# Patient Record
Sex: FEMALE | Race: WHITE | NOT HISPANIC OR LATINO | Employment: PART TIME | URBAN - METROPOLITAN AREA
[De-identification: names, ages, dates, MRNs, and addresses within clinical notes are randomized per-mention and may not be internally consistent; named-entity substitution may affect disease eponyms.]

---

## 2017-03-24 ENCOUNTER — OFFICE VISIT (OUTPATIENT)
Dept: ORTHOPEDIC SURGERY | Facility: CLINIC | Age: 72
End: 2017-03-24

## 2017-03-24 VITALS
DIASTOLIC BLOOD PRESSURE: 80 MMHG | WEIGHT: 148 LBS | SYSTOLIC BLOOD PRESSURE: 148 MMHG | BODY MASS INDEX: 25.27 KG/M2 | HEART RATE: 90 BPM | HEIGHT: 64 IN

## 2017-03-24 DIAGNOSIS — R52 PAIN: Primary | ICD-10-CM

## 2017-03-24 DIAGNOSIS — S93.602A FOOT SPRAIN, LEFT, INITIAL ENCOUNTER: ICD-10-CM

## 2017-03-24 PROBLEM — S93.609A FOOT SPRAIN: Status: ACTIVE | Noted: 2017-03-24

## 2017-03-24 PROCEDURE — 99203 OFFICE O/P NEW LOW 30 MIN: CPT | Performed by: NURSE PRACTITIONER

## 2017-03-24 PROCEDURE — 73630 X-RAY EXAM OF FOOT: CPT | Performed by: NURSE PRACTITIONER

## 2017-03-24 RX ORDER — MELOXICAM 7.5 MG/1
7.5 TABLET ORAL DAILY
Qty: 30 TABLET | Refills: 0 | Status: SHIPPED | OUTPATIENT
Start: 2017-03-24 | End: 2017-06-23

## 2017-03-24 NOTE — PROGRESS NOTES
Subjective:     Patient ID: Codie Munson is a 71 y.o. female.    Chief Complaint: Left foot pain    History of Present Illness    Patient is 71 y.o female who presents with a reported 5 month history of pain at lateral aspect left foot/ankle. Denies known injury to left foot and ankle. Denies wearing new shoes however did replace insoles within last five months. Increased pain with exercises activities and relief with rest. Denies that she has been taking medications by mouth to help decrease symptoms. Has tried applying multiple topical pain relievers (OTC) without significant relief. Pain was not present prior to the last five months and she was completing the same activities. Denies known imaging. Denies presence of numbness or tingling at left foot/ankle. Denies all other concerns present at this time.      Social History     Occupational History   • Not on file.     Social History Main Topics   • Smoking status: Never Smoker   • Smokeless tobacco: Not on file   • Alcohol use No   • Drug use: Defer   • Sexual activity: Defer      Past Medical History:   Diagnosis Date   • Cardiomyopathy    • Chronic cystitis    • Cough    • Depression    • GERD (gastroesophageal reflux disease)    • Hyperlipidemia    • Hypertension    • Scarlet fever    • Strain of thoracic region    • Urethral prolapse    • URI (upper respiratory infection)    • Urinary retention      Past Surgical History:   Procedure Laterality Date   • APPENDECTOMY     • BLADDER SURGERY     • CARDIAC CATHETERIZATION      Normal. Performed at [facility].   •  SECTION         Family History   Problem Relation Age of Onset   • Heart failure Mother    • Cancer Mother      Bladder   • Kidney failure Mother    • Aneurysm Father    • Heart disease Father      CABG   • Stroke Father    • Crohn's disease Sister    • Prostate cancer Brother    • Cancer Brother      bladder         Review of Systems   Constitutional: Negative for chills, diaphoresis,  "fever and unexpected weight change.   HENT: Negative for hearing loss, nosebleeds, sore throat and tinnitus.    Eyes: Negative for pain and visual disturbance.   Respiratory: Negative for cough, shortness of breath and wheezing.    Cardiovascular: Negative for chest pain and palpitations.   Gastrointestinal: Negative for abdominal pain, diarrhea, nausea and vomiting.   Endocrine: Negative for cold intolerance, heat intolerance and polydipsia.   Genitourinary: Negative for difficulty urinating, dysuria and hematuria.   Musculoskeletal: Negative for arthralgias, joint swelling and myalgias.   Skin: Negative for rash and wound.   Allergic/Immunologic: Negative for environmental allergies.   Neurological: Negative for dizziness, syncope and numbness.   Hematological: Does not bruise/bleed easily.   Psychiatric/Behavioral: Negative for dysphoric mood and sleep disturbance. The patient is not nervous/anxious.        Objective:  Physical Exam    Vital signs reviewed.   General: No acute distress.  Eyes: conjunctiva clear; pupils equally round and reactive  ENT: external ears and nose atraumatic; oropharynx clear  CV: no peripheral edema  Resp: normal respiratory effort  Skin: no rashes or wounds; normal turgor  Psych: mood and affect appropriate; recent and remote memory intact    Vitals:    03/24/17 1424   BP: 148/80   Pulse: 90   Weight: 148 lb (67.1 kg)   Height: 64\" (162.6 cm)     Last 2 weights    03/24/17  1424   Weight: 148 lb (67.1 kg)     Body mass index is 25.4 kg/(m^2).     Left Ankle Exam   Swelling: none    Tenderness   The patient is experiencing tenderness in the CF.     Range of Motion   Dorsiflexion: 30   Plantar flexion: 45   Inversion: 45   Eversion: 25     Muscle Strength   Plantar flexion:  5/5   Anterior tibial:  5/5   Posterior tibial:  5/5  Gastrocsoleus:  5/5  Peroneal muscle:  5/5    Tests   Anterior drawer: negative  Varus tilt: negative    Other   Erythema: absent  Scars: absent  Sensation: " normal  Pulse: present    Comments:  Pain present lateral aspect foot  Negative Homans sign  Negative Squeeze test          Imaging:  Left Foot X-Ray  Indication: Pain  AP, Lateral, and Oblique views    Findings:  No fracture  No bony lesion  Normal soft tissues  Calcaneal osteoarthritic changes noted    No prior studies were available for comparison.    Assessment:       1. Pain    2. Foot sprain, left, initial encounter          Plan:  1. Discussed plan of care with patient. Will start meloxicam daily.  2. Provided with sample of Pennsaid to apply to left foot. Will plan to follow-up in four weeks. Encouraged patient to call clinic. Will attempt to send in prescription for Voltaren. If not covered, will request sample from our [employer] representative. Encouraged to double check insoles to assure proper fit. Patient verbalized understanding of all information and agrees with plan of care. Denies all other concerns present at this time.     RAJWINDER query complete.

## 2017-06-23 ENCOUNTER — OFFICE VISIT (OUTPATIENT)
Dept: CARDIOLOGY | Facility: CLINIC | Age: 72
End: 2017-06-23

## 2017-06-23 VITALS
SYSTOLIC BLOOD PRESSURE: 140 MMHG | BODY MASS INDEX: 25.4 KG/M2 | HEART RATE: 67 BPM | HEIGHT: 64 IN | WEIGHT: 148.8 LBS | DIASTOLIC BLOOD PRESSURE: 80 MMHG

## 2017-06-23 DIAGNOSIS — R06.09 DYSPNEA ON EXERTION: ICD-10-CM

## 2017-06-23 DIAGNOSIS — IMO0001 ELEVATED BP: Primary | ICD-10-CM

## 2017-06-23 DIAGNOSIS — E78.5 HYPERLIPIDEMIA, UNSPECIFIED HYPERLIPIDEMIA TYPE: ICD-10-CM

## 2017-06-23 PROCEDURE — 99214 OFFICE O/P EST MOD 30 MIN: CPT | Performed by: INTERNAL MEDICINE

## 2017-06-23 PROCEDURE — 93000 ELECTROCARDIOGRAM COMPLETE: CPT | Performed by: INTERNAL MEDICINE

## 2017-06-23 RX ORDER — ROSUVASTATIN CALCIUM 20 MG/1
20 TABLET, COATED ORAL NIGHTLY
Qty: 90 TABLET | Refills: 3 | Status: SHIPPED | OUTPATIENT
Start: 2017-06-23 | End: 2018-08-25 | Stop reason: SDUPTHER

## 2017-06-23 NOTE — PROGRESS NOTES
Date of Office Visit: 2017  Encounter Provider: Anamika Lane MD  Place of Service: [facility] CARDIOLOGY  Patient Name: Codie Munson  :1945      Patient ID:  Codie Munson is a 71 y.o. female is here for  followup for         History of Present Illness    I originally saw her for complaints of dizziness, palpitations, and shortness of air in  . at that time she had a stress study which was abnormal with subsequent cardiac  catheterization at [facility] which showed no significant coronary artery disease.   Her left ventricular systolic function was normal. She however had an echocardiogram done  at the same time which showed an ejection fraction of 40%. Because of her lightheadedness  and dizziness, she had carotid duplex studies which showed minimal disease of the left  carotid artery. She then had repeat carotid duplex study performed on 2010  which showed no carotid artery disease.      Her last echocardiogram done in 2009 showed an ejection fraction of 54%, redundant  intra-atrial septum, a normal saline contrast study, grade I diastolic dysfunction, and no  significant valve abnormalities.      She had a bladder resuspension with Dr. Harish Arellano.      She was lightheaded and we decreased her losartan to 25 mg daily and she improved but then she worsened and so it was discontinued. She said the lightheadedness went away completely when she started wearing compression stockings which are knee high. She went to see Dr. Elliott, and he  did not think there was anything he could do with her left leg but she has continued  aching in that leg and she is wearing compression stockings daily. I did get the letter from Dr. Elliott's office which I reviewed. It says that the patient  has evidence of reflux of the lesser and greater saphenous veins. They recommended  compression stockings because she was not very symptomatic. This was  dictated on  2014.      At the patients last visit 2016 she was having a lot of coughing, fatigue, respiratory issues. She had been seen and placed on steroids and Levaquin but had severe reaction with left shoulder pain due to Levaquin and then went on to see Dr. Landeros from [facility], and he agreed that she had severe allergies and started her on allergy shots. I was however concerned though because of her history of cardiomyopathy and ordered an echocardiogram which was done 2016, and this showed ejection fraction of 60% with grade I diastolic dysfunction, normal segmental wall motion and no significant valve disease.            Past Medical History:   Diagnosis Date   • Cardiomyopathy    • Chronic cystitis    • Cough    • Depression    • GERD (gastroesophageal reflux disease)    • Hyperlipidemia    • Hypertension    • Scarlet fever    • Strain of thoracic region    • Urethral prolapse    • URI (upper respiratory infection)    • Urinary retention          Past Surgical History:   Procedure Laterality Date   • APPENDECTOMY     • BLADDER SURGERY     • CARDIAC CATHETERIZATION      Normal. Performed at [facility].   •  SECTION         Current Outpatient Prescriptions on File Prior to Visit   Medication Sig Dispense Refill   • esomeprazole (NexIUM) 40 MG capsule Take 40 mg by mouth every morning before breakfast.     • Psyllium (METAMUCIL FIBER PO) Take  by mouth.     • [DISCONTINUED] CRESTOR 20 MG tablet TAKE ONE TABLET BY MOUTH DAILY 90 tablet 1   • [DISCONTINUED] cefdinir (OMNICEF) 300 MG capsule Take 1 capsule by mouth 2 (Two) Times a Day. 20 capsule 0   • [DISCONTINUED] HYDROcodone-homatropine (HYCODAN) 5-1.5 MG/5ML syrup Take 5 mL by mouth Every 6 (Six) Hours As Needed for cough. 180 mL 0   • [DISCONTINUED] meloxicam (MOBIC) 7.5 MG tablet Take 1 tablet by mouth Daily. 30 tablet 0     No current facility-administered medications on file prior to visit.        Social History  "    Social History   • Marital status:      Spouse name: N/A   • Number of children: N/A   • Years of education: N/A     Occupational History   • Not on file.     Social History Main Topics   • Smoking status: Never Smoker   • Smokeless tobacco: Not on file   • Alcohol use No   • Drug use: Defer   • Sexual activity: Defer     Other Topics Concern   • Not on file     Social History Narrative           Review of Systems   Constitution: Negative.   HENT: Negative for congestion and headaches.    Eyes: Negative for vision loss in left eye and vision loss in right eye.   Respiratory: Negative.  Negative for cough, hemoptysis, shortness of breath, sleep disturbances due to breathing, snoring, sputum production and wheezing.    Endocrine: Negative.    Hematologic/Lymphatic: Negative.    Skin: Negative for poor wound healing and rash.   Musculoskeletal: Negative for falls, gout, muscle cramps and myalgias.   Gastrointestinal: Negative for abdominal pain, diarrhea, dysphagia, hematemesis, melena, nausea and vomiting.   Neurological: Negative for excessive daytime sleepiness, dizziness, light-headedness, loss of balance, seizures and vertigo.   Psychiatric/Behavioral: Negative for depression and substance abuse. The patient is not nervous/anxious.        Procedures    ECG 12 Lead  Date/Time: 6/23/2017 10:12 AM  Performed by: KADIE HIDALGO  Authorized by: KADIE HIDALGO   Comparison: compared with previous ECG   Similar to previous ECG  Rhythm: sinus rhythm  T depression: V3 and V4  T flattening: V5 and V6  Clinical impression: abnormal ECG               Objective:      Vitals:    06/23/17 1002   BP: 140/80   BP Location: Right arm   Patient Position: Sitting   Pulse: 67   Weight: 148 lb 12.8 oz (67.5 kg)   Height: 64\" (162.6 cm)     Body mass index is 25.54 kg/(m^2).    Physical Exam   Constitutional: She is oriented to person, place, and time. She appears well-developed and well-nourished. No distress. "   HENT:   Head: Normocephalic and atraumatic.   Eyes: Conjunctivae are normal. No scleral icterus.   Neck: Neck supple. No JVD present. Carotid bruit is not present. No thyromegaly present.   Cardiovascular: Normal rate, regular rhythm, S1 normal, S2 normal, normal heart sounds and intact distal pulses.   No extrasystoles are present. PMI is not displaced.  Exam reveals no gallop.    No murmur heard.  Pulses:       Carotid pulses are 2+ on the right side, and 2+ on the left side.       Radial pulses are 2+ on the right side, and 2+ on the left side.        Dorsalis pedis pulses are 2+ on the right side, and 2+ on the left side.        Posterior tibial pulses are 2+ on the right side, and 2+ on the left side.   Pulmonary/Chest: Effort normal and breath sounds normal. No respiratory distress. She has no wheezes. She has no rhonchi. She has no rales. She exhibits no tenderness.   Abdominal: Soft. Bowel sounds are normal. She exhibits no distension, no abdominal bruit and no mass. There is no tenderness.   Musculoskeletal: She exhibits no edema or deformity.   Lymphadenopathy:     She has no cervical adenopathy.   Neurological: She is alert and oriented to person, place, and time. No cranial nerve deficit.   Skin: Skin is warm and dry. No rash noted. She is not diaphoretic. No cyanosis. No pallor. Nails show no clubbing.   Psychiatric: She has a normal mood and affect. Judgment normal.   Vitals reviewed.      Lab Review:       Assessment:      Diagnosis Plan   1. Elevated BP  Treadmill Stress Test    Vascular Screening   2. Hyperlipidemia, unspecified hyperlipidemia type  Treadmill Stress Test    Hepatic Function Panel    Lipid Panel    Vascular Screening   3. Dyspnea on exertion  Treadmill Stress Test     1. Hyperlipidemia, on Crestor and fish oil. Well controlled. Check liver and lipids.    2. Hypertension. No meds, just watch for now.   3. Normal renal arteriogram in 2002.   4. Normal cardiac catheterization in 2005.    5. Venous varicosities. See Dr. Perrin.  6. Numbness of her third toe on her right foot. Stable.   7. Recent bladder resuspension with Dr. Harish Arellano.  8. Severe dyspnea, worse with exertion and fatigue. Set up treadmill stress study.  9. Allergies, getting allergy shots, sees Dr. Landeros.    Set up vascular screening.      Plan:       See back in 1 year.

## 2017-06-26 ENCOUNTER — TRANSCRIBE ORDERS (OUTPATIENT)
Dept: CARDIOLOGY | Facility: HOSPITAL | Age: 72
End: 2017-06-26

## 2017-06-26 ENCOUNTER — TRANSCRIBE ORDERS (OUTPATIENT)
Dept: CARDIOLOGY | Facility: CLINIC | Age: 72
End: 2017-06-26

## 2017-06-26 DIAGNOSIS — IMO0001 ELEVATED BP: Primary | ICD-10-CM

## 2017-06-28 ENCOUNTER — HOSPITAL ENCOUNTER (OUTPATIENT)
Dept: CARDIOLOGY | Facility: HOSPITAL | Age: 72
Discharge: HOME | End: 2017-06-28
Attending: INTERNAL MEDICINE | Admitting: INTERNAL MEDICINE

## 2017-06-28 ENCOUNTER — LAB (OUTPATIENT)
Dept: LAB | Facility: HOSPITAL | Age: 72
End: 2017-06-28
Attending: INTERNAL MEDICINE

## 2017-06-28 ENCOUNTER — TELEPHONE (OUTPATIENT)
Dept: CARDIOLOGY | Facility: CLINIC | Age: 72
End: 2017-06-28

## 2017-06-28 VITALS
HEIGHT: 64 IN | OXYGEN SATURATION: 100 % | SYSTOLIC BLOOD PRESSURE: 151 MMHG | BODY MASS INDEX: 25.27 KG/M2 | DIASTOLIC BLOOD PRESSURE: 95 MMHG | HEART RATE: 81 BPM | WEIGHT: 148 LBS | RESPIRATION RATE: 18 BRPM

## 2017-06-28 DIAGNOSIS — E78.5 HYPERLIPIDEMIA, UNSPECIFIED HYPERLIPIDEMIA TYPE: ICD-10-CM

## 2017-06-28 DIAGNOSIS — R06.09 DYSPNEA ON EXERTION: Primary | ICD-10-CM

## 2017-06-28 DIAGNOSIS — R06.09 DYSPNEA ON EXERTION: ICD-10-CM

## 2017-06-28 DIAGNOSIS — IMO0001 ELEVATED BP: ICD-10-CM

## 2017-06-28 LAB
ALBUMIN SERPL-MCNC: 4 G/DL (ref 3.5–5.2)
ALP SERPL-CCNC: 90 U/L (ref 40–129)
ALT SERPL W P-5'-P-CCNC: 12 U/L (ref 5–33)
AST SERPL-CCNC: 14 U/L (ref 5–32)
BH CV STRESS BP STAGE 1: NORMAL
BH CV STRESS DURATION MIN STAGE 1: 3
BH CV STRESS DURATION SEC STAGE 1: 0
BH CV STRESS GRADE STAGE 1: 10
BH CV STRESS HR STAGE 1: 154
BH CV STRESS METS STAGE 1: 5
BH CV STRESS PROTOCOL 1: NORMAL
BH CV STRESS RECOVERY BP: NORMAL MMHG
BH CV STRESS RECOVERY HR: 86 BPM
BH CV STRESS SPEED STAGE 1: 1.7
BH CV STRESS STAGE 1: 1
BILIRUB CONJ SERPL-MCNC: <0.2 MG/DL (ref 0.2–0.3)
BILIRUB INDIRECT SERPL-MCNC: ABNORMAL MG/DL
BILIRUB SERPL-MCNC: 0.4 MG/DL (ref 0.2–1.2)
CHOLEST SERPL-MCNC: 167 MG/DL (ref 0–200)
HDLC SERPL-MCNC: 50 MG/DL (ref 40–60)
LDLC SERPL CALC-MCNC: 83 MG/DL (ref 0–100)
LDLC/HDLC SERPL: 1.66 {RATIO}
MAXIMAL PREDICTED HEART RATE: 147 BPM
PERCENT MAX PREDICTED HR: 104.76 %
PROT SERPL-MCNC: 6.6 G/DL (ref 6–8.5)
STRESS BASELINE BP: NORMAL MMHG
STRESS BASELINE HR: 81 BPM
STRESS O2 SAT REST: 100 %
STRESS PERCENT HR: 123 %
STRESS POST ESTIMATED WORKLOAD: 4.6 METS
STRESS POST EXERCISE DUR MIN: 3 MIN
STRESS POST EXERCISE DUR SEC: 0 SEC
STRESS POST PEAK BP: NORMAL MMHG
STRESS POST PEAK HR: 154 BPM
STRESS TARGET HR: 125 BPM
TRIGL SERPL-MCNC: 170 MG/DL (ref 0–150)
VLDLC SERPL-MCNC: 34 MG/DL (ref 7–27)

## 2017-06-28 PROCEDURE — 93018 CV STRESS TEST I&R ONLY: CPT | Performed by: INTERNAL MEDICINE

## 2017-06-28 PROCEDURE — 80076 HEPATIC FUNCTION PANEL: CPT

## 2017-06-28 PROCEDURE — 36415 COLL VENOUS BLD VENIPUNCTURE: CPT

## 2017-06-28 PROCEDURE — 93016 CV STRESS TEST SUPVJ ONLY: CPT | Performed by: INTERNAL MEDICINE

## 2017-06-28 PROCEDURE — 93017 CV STRESS TEST TRACING ONLY: CPT

## 2017-06-28 PROCEDURE — 80061 LIPID PANEL: CPT

## 2017-06-28 NOTE — TELEPHONE ENCOUNTER
----- Message from Anamika Lane MD sent at 6/28/2017  8:43 AM EDT -----  pls call and let her know that labs look good, no changes.

## 2017-06-29 ENCOUNTER — TRANSCRIBE ORDERS (OUTPATIENT)
Dept: ADMINISTRATIVE | Facility: HOSPITAL | Age: 72
End: 2017-06-29

## 2017-06-29 DIAGNOSIS — Z13.9 SCREENING: Primary | ICD-10-CM

## 2017-07-05 ENCOUNTER — TELEPHONE (OUTPATIENT)
Dept: CARDIOLOGY | Facility: CLINIC | Age: 72
End: 2017-07-05

## 2017-07-05 NOTE — TELEPHONE ENCOUNTER
Pt called, she is having some dental wok in the next couple of days. She has a cracked tooth and was wondering if she needs an anti biotic prior to getting it fixed. She said she was concerned because of her upcoming testing and going to see someone about her legs. She can be reached at # [phone] if need be. Please advise.    Thanks,  Lacey

## 2017-07-10 ENCOUNTER — HOSPITAL ENCOUNTER (OUTPATIENT)
Dept: NUCLEAR MEDICINE | Facility: HOSPITAL | Age: 72
Discharge: HOME | End: 2017-07-10
Attending: INTERNAL MEDICINE

## 2017-07-10 ENCOUNTER — HOSPITAL ENCOUNTER (OUTPATIENT)
Dept: CARDIOLOGY | Facility: HOSPITAL | Age: 72
Discharge: HOME | End: 2017-07-10
Attending: INTERNAL MEDICINE

## 2017-07-10 DIAGNOSIS — R06.09 DYSPNEA ON EXERTION: ICD-10-CM

## 2017-07-10 LAB
BH CV NUCLEAR PRIOR STUDY: 3
BH CV STRESS BP STAGE 1: NORMAL
BH CV STRESS DURATION MIN STAGE 1: 3
BH CV STRESS DURATION SEC STAGE 1: 0
BH CV STRESS GRADE STAGE 1: 10
BH CV STRESS HR STAGE 1: 146
BH CV STRESS METS STAGE 1: 5
BH CV STRESS PROTOCOL 1: NORMAL
BH CV STRESS RECOVERY BP: NORMAL MMHG
BH CV STRESS RECOVERY HR: 82 BPM
BH CV STRESS SPEED STAGE 1: 1.7
BH CV STRESS STAGE 1: 1
LV EF NUC BP: 47 %
MAXIMAL PREDICTED HEART RATE: 147 BPM
PERCENT MAX PREDICTED HR: 100.68 %
STRESS BASELINE BP: NORMAL MMHG
STRESS BASELINE HR: 81 BPM
STRESS O2 SAT REST: 100 %
STRESS PERCENT HR: 118 %
STRESS POST ESTIMATED WORKLOAD: 4.6 METS
STRESS POST EXERCISE DUR MIN: 2 MIN
STRESS POST EXERCISE DUR SEC: 14 SEC
STRESS POST PEAK BP: NORMAL MMHG
STRESS POST PEAK HR: 148 BPM
STRESS TARGET HR: 125 BPM

## 2017-07-10 PROCEDURE — A9500 TC99M SESTAMIBI: HCPCS | Performed by: INTERNAL MEDICINE

## 2017-07-10 PROCEDURE — 93016 CV STRESS TEST SUPVJ ONLY: CPT | Performed by: INTERNAL MEDICINE

## 2017-07-10 PROCEDURE — 0 TECHNETIUM SESTAMIBI: Performed by: INTERNAL MEDICINE

## 2017-07-10 PROCEDURE — 93017 CV STRESS TEST TRACING ONLY: CPT

## 2017-07-10 PROCEDURE — 78452 HT MUSCLE IMAGE SPECT MULT: CPT | Performed by: INTERNAL MEDICINE

## 2017-07-10 PROCEDURE — 93018 CV STRESS TEST I&R ONLY: CPT | Performed by: INTERNAL MEDICINE

## 2017-07-10 PROCEDURE — 78452 HT MUSCLE IMAGE SPECT MULT: CPT

## 2017-07-10 RX ADMIN — Medication 1 DOSE: at 07:30

## 2017-07-10 RX ADMIN — Medication 1 DOSE: at 10:45

## 2017-07-11 ENCOUNTER — TELEPHONE (OUTPATIENT)
Dept: CARDIOLOGY | Facility: CLINIC | Age: 72
End: 2017-07-11

## 2017-07-11 NOTE — TELEPHONE ENCOUNTER
----- Message from Anamika Lane MD sent at 7/11/2017  7:51 AM EDT -----  pls call and let her know that the stress is normal.

## 2017-07-12 ENCOUNTER — TELEPHONE (OUTPATIENT)
Dept: CARDIOLOGY | Facility: CLINIC | Age: 72
End: 2017-07-12

## 2017-07-12 ENCOUNTER — HOSPITAL ENCOUNTER (OUTPATIENT)
Dept: CARDIOLOGY | Facility: HOSPITAL | Age: 72
Discharge: HOME | End: 2017-07-12
Attending: INTERNAL MEDICINE | Admitting: INTERNAL MEDICINE

## 2017-07-12 VITALS
BODY MASS INDEX: 23.66 KG/M2 | DIASTOLIC BLOOD PRESSURE: 74 MMHG | SYSTOLIC BLOOD PRESSURE: 141 MMHG | WEIGHT: 142 LBS | HEART RATE: 62 BPM | HEIGHT: 65 IN

## 2017-07-12 DIAGNOSIS — Z13.9 SCREENING: ICD-10-CM

## 2017-07-12 LAB
BH CV ECHO MEAS - DIST AO DIAM: 1.19 CM
BH CV VAS BP LEFT ARM: NORMAL MMHG
BH CV VAS BP RIGHT ARM: NORMAL MMHG
BH CV XLRA MEAS - MID AO DIAM: 1.54 CM
BH CV XLRA MEAS - PAD LEFT ABI DP: 1.17
BH CV XLRA MEAS - PAD LEFT ABI PT: 1.19
BH CV XLRA MEAS - PAD LEFT ARM: 141 MMHG
BH CV XLRA MEAS - PAD LEFT LEG DP: 166 MMHG
BH CV XLRA MEAS - PAD LEFT LEG PT: 168 MMHG
BH CV XLRA MEAS - PAD RIGHT ABI DP: 1.2
BH CV XLRA MEAS - PAD RIGHT ABI PT: 1.2
BH CV XLRA MEAS - PAD RIGHT ARM: 137 MMHG
BH CV XLRA MEAS - PAD RIGHT LEG DP: 170 MMHG
BH CV XLRA MEAS - PAD RIGHT LEG PT: 170 MMHG
BH CV XLRA MEAS - PROX AO DIAM: 1.74 CM
BH CV XLRA MEAS LEFT ICA/CCA RATIO: 1.25
BH CV XLRA MEAS LEFT MID CCA PSV: NORMAL CM/SEC
BH CV XLRA MEAS LEFT MID ICA PSV: NORMAL CM/SEC
BH CV XLRA MEAS LEFT PROX ECA PSV: NORMAL CM/SEC
BH CV XLRA MEAS RIGHT ICA/CCA RATIO: 1.22
BH CV XLRA MEAS RIGHT MID CCA PSV: NORMAL CM/SEC
BH CV XLRA MEAS RIGHT MID ICA PSV: NORMAL CM/SEC
BH CV XLRA MEAS RIGHT PROX ECA PSV: NORMAL CM/SEC

## 2017-07-12 PROCEDURE — 93799 UNLISTED CV SVC/PROCEDURE: CPT

## 2017-07-12 NOTE — TELEPHONE ENCOUNTER
----- Message from Anmaika Lane MD sent at 7/12/2017  3:27 PM EDT -----  pls call, vascular testing normal.

## 2018-07-20 ENCOUNTER — OFFICE VISIT (OUTPATIENT)
Dept: FAMILY MEDICINE CLINIC | Facility: CLINIC | Age: 73
End: 2018-07-20

## 2018-07-20 VITALS
BODY MASS INDEX: 23.66 KG/M2 | HEIGHT: 65 IN | TEMPERATURE: 98.2 F | HEART RATE: 74 BPM | WEIGHT: 142 LBS | OXYGEN SATURATION: 98 % | DIASTOLIC BLOOD PRESSURE: 82 MMHG | RESPIRATION RATE: 16 BRPM | SYSTOLIC BLOOD PRESSURE: 120 MMHG

## 2018-07-20 DIAGNOSIS — Z00.00 MEDICARE ANNUAL WELLNESS VISIT, SUBSEQUENT: Primary | ICD-10-CM

## 2018-07-20 DIAGNOSIS — Z12.31 SCREENING MAMMOGRAM, ENCOUNTER FOR: ICD-10-CM

## 2018-07-20 DIAGNOSIS — E78.2 MIXED HYPERLIPIDEMIA: ICD-10-CM

## 2018-07-20 PROCEDURE — 99212 OFFICE O/P EST SF 10 MIN: CPT | Performed by: PHYSICIAN ASSISTANT

## 2018-07-20 PROCEDURE — G0439 PPPS, SUBSEQ VISIT: HCPCS | Performed by: PHYSICIAN ASSISTANT

## 2018-07-20 RX ORDER — MAGNESIUM OXIDE 400 MG/1
250 TABLET ORAL EVERY OTHER DAY
COMMUNITY
End: 2020-10-28

## 2018-07-20 RX ORDER — FLUTICASONE PROPIONATE 50 MCG
2 SPRAY, SUSPENSION (ML) NASAL AS NEEDED
COMMUNITY

## 2018-07-20 NOTE — PROGRESS NOTES
QUICK REFERENCE INFORMATION:  The ABCs of the Annual Wellness Visit    Subsequent Medicare Wellness Visit    HEALTH RISK ASSESSMENT    1945    Recent Hospitalizations:  No hospitalization(s) within the last year..        Current Medical Providers:  Patient Care Team:  Sri Leach PA-C as PCP - General (Family Medicine)        Smoking Status:  History   Smoking Status   • Never Smoker   Smokeless Tobacco   • Never Used       Alcohol Consumption:  History   Alcohol Use No       Depression Screen:   PHQ-2/PHQ-9 Depression Screening 7/20/2018   Little interest or pleasure in doing things 0   Feeling down, depressed, or hopeless 0   Total Score 0       Health Habits and Functional and Cognitive Screening:  No flowsheet data found.        Does the patient have evidence of cognitive impairment? No    Aspirin use counseling: Does not need ASA (and currently is not on it)      Recent Lab Results:  CMP:  Lab Results   Component Value Date    BUN 11 08/01/2016    CREATININE 0.92 08/01/2016    EGFRIFNONA 60 (L) 08/01/2016    BCR 12.0 08/01/2016     08/01/2016    K 4.0 08/01/2016    CO2 29.1 (H) 08/01/2016    CALCIUM 9.5 08/01/2016    ALBUMIN 4.00 06/28/2017    BILITOT 0.4 06/28/2017    ALKPHOS 90 06/28/2017    AST 14 06/28/2017    ALT 12 06/28/2017     Lipid Panel:  Lab Results   Component Value Date    CHOL 167 06/28/2017    TRIG 170 (H) 06/28/2017    HDL 50 06/28/2017    VLDL 34 (H) 06/28/2017    LDLHDL 1.66 06/28/2017     HbA1c:       Visual Acuity:  No exam data present    Age-appropriate Screening Schedule:  Refer to the list below for future screening recommendations based on patient's age, sex and/or medical conditions. Orders for these recommended tests are listed in the plan section. The patient has been provided with a written plan.    Health Maintenance   Topic Date Due   • TDAP/TD VACCINES (1 - Tdap) 09/04/1962   • ZOSTER VACCINE (1 of 2) 09/04/1993   • PNEUMOCOCCAL VACCINES (65+ LOW/MEDIUM RISK) (1  "of 2 - PCV13) 09/04/2008   • INFLUENZA VACCINE  08/01/2018   • LIPID PANEL  07/20/2019   • MAMMOGRAM  Excluded   • COLONOSCOPY  Excluded        Subjective   History of Present Illness    Codie Munson is a 72 y.o. female who presents for an Subsequent Wellness Visit. Codie states she is feeling well at today's office visit.  Diet has been healthy.  She tries to exercise by walking several times a week.  Sleep has been good.  Bowel movements are daily without dark black tarry stools.  She refuses colonoscopy.  States she had her shingles immunization at [facility] in [place] this year.  Developed a slight rash at injection site.  She was seen at urgent care in [place] night last week and diagnosed with acute sinusitis.  She was prescribed Ceftin antibiotic.  She also has cerumen impaction of right ear.  They were able to do an ear lavage with resolution of earwax.  She's been having a hoarse voice with postnasal drip.  Denied any sore throat, fevers, chills, ear pain, sinus pressure, headache, chest pain, shortness of air, wheezing, nausea, vomiting, diarrhea, abdominal pain or swelling of ankles.  Currently seeing Dr. Anamika Lane, cardiologist.    The following portions of the patient's history were reviewed and updated as appropriate: allergies, current medications, past family history, past medical history, past social history and past surgical history.  Social History   Substance Use Topics   • Smoking status: Never Smoker   • Smokeless tobacco: Never Used   • Alcohol use No       Vitals:    07/20/18 0806   BP: 120/82   Pulse: 74   Resp: 16   Temp: 98.2 °F (36.8 °C)   TempSrc: Oral   SpO2: 98%   Weight: 64.4 kg (142 lb)   Height: 163.8 cm (64.5\")       Body mass index is 24 kg/m².   Allergies   Allergen Reactions   • Augmentin [Amoxicillin-Pot Clavulanate] Rash   • Biaxin [Clarithromycin] Rash       Wt Readings from Last 3 Encounters:   07/20/18 64.4 kg (142 lb)   07/12/17 64.4 kg (142 lb)   06/28/17 " 67.1 kg (148 lb)       BP Readings from Last 3 Encounters:   07/20/18 120/82   07/12/17 141/74   06/28/17 151/95     Outpatient Medications Prior to Visit   Medication Sig Dispense Refill   • esomeprazole (NexIUM) 40 MG capsule Take 40 mg by mouth every morning before breakfast.     • Multiple Vitamin (MULTI VITAMIN DAILY PO) Take 1 tablet by mouth Daily.     • rosuvastatin (CRESTOR) 20 MG tablet Take 1 tablet by mouth Every Night. 90 tablet 3   • ALLERGY SERUM INJECTION Inject  under the skin 1 (One) Time.     • Psyllium (METAMUCIL FIBER PO) Take  by mouth.     • TURMERIC PO Take 1 tablet by mouth Daily.       No facility-administered medications prior to visit.        Patient Active Problem List   Diagnosis   • Cardiomyopathy (CMS/HCC)   • Bladder infection, chronic   • Blues   • Gastro-esophageal reflux disease without esophagitis   • HLD (hyperlipidemia)   • Prolapse of urethra   • Incomplete bladder emptying   • Gastroesophageal reflux disease with hiatal hernia   • Erosive gastritis   • Diverticulosis of large intestine without hemorrhage   • Elevated BP   • Chronic UTI   • Foot sprain   • Medicare annual wellness visit, subsequent   • Screening mammogram, encounter for       Advance Care Planning:  has NO advance directive - not interested in additional information    Identification of Risk Factors:  Risk factors include: cardiovascular risk.    Review of Systems   Constitutional: Negative.    HENT: Negative.    Eyes: Negative.    Respiratory: Negative.    Cardiovascular: Negative.    Gastrointestinal: Negative.    Endocrine: Negative.    Genitourinary: Negative.    Musculoskeletal: Negative.    Skin: Negative.    Allergic/Immunologic: Negative.    Neurological: Negative.    Hematological: Negative.    Psychiatric/Behavioral: Negative.    All other systems reviewed and are negative.      Compared to one year ago, the patient feels her physical health is the same.  Compared to one year ago, the patient feels  her mental health is the same.    Objective     Physical Exam   Constitutional: She is oriented to person, place, and time. Vital signs are normal. She appears well-developed and well-nourished.   HENT:   Head: Normocephalic and atraumatic.   Right Ear: Hearing, tympanic membrane, external ear and ear canal normal.   Left Ear: Hearing, tympanic membrane, external ear and ear canal normal.   Nose: Nose normal. Right sinus exhibits no maxillary sinus tenderness and no frontal sinus tenderness. Left sinus exhibits no maxillary sinus tenderness and no frontal sinus tenderness.   Mouth/Throat: Uvula is midline, oropharynx is clear and moist and mucous membranes are normal.   Eyes: Pupils are equal, round, and reactive to light. Conjunctivae, EOM and lids are normal.   Neck: Trachea normal and phonation normal. Neck supple. Carotid bruit is not present. No edema present. No thyromegaly present.   Cardiovascular: Normal rate, regular rhythm, S1 normal, S2 normal, normal heart sounds and normal pulses.    No murmur heard.  Pulmonary/Chest: Effort normal and breath sounds normal. Right breast exhibits no inverted nipple, no mass, no nipple discharge, no skin change and no tenderness. Left breast exhibits no inverted nipple, no mass, no nipple discharge, no skin change and no tenderness.   Abdominal: Soft. Normal appearance, normal aorta and bowel sounds are normal. There is no hepatomegaly. There is no tenderness.   Lymphadenopathy:     She has no cervical adenopathy.   Neurological: She is alert and oriented to person, place, and time.   Skin: Skin is warm, dry and intact. Capillary refill takes less than 2 seconds.   Psychiatric: She has a normal mood and affect. Her speech is normal and behavior is normal. Judgment and thought content normal. Cognition and memory are normal.       Vitals:    07/20/18 0806   BP: 120/82   Pulse: 74   Resp: 16   Temp: 98.2 °F (36.8 °C)   TempSrc: Oral   SpO2: 98%   Weight: 64.4 kg (142 lb)  "  Height: 163.8 cm (64.5\")       Patient's Body mass index is 24 kg/m². BMI is within normal parameters. No follow-up required.      Assessment/Plan   Patient Self-Management and Personalized Health Advice  The patient has been provided with information about: exercise and preventive services including:   · Screening mammography, referral placed.    Visit Diagnoses:    ICD-10-CM ICD-9-CM   1. Medicare annual wellness visit, subsequent Z00.00 V70.0   2. Mixed hyperlipidemia E78.2 272.2   3. Screening mammogram, encounter for Z12.31 V76.12       Orders Placed This Encounter   Procedures   • Mammo Screening Digital Tomosynthesis Bilateral With CAD     Standing Status:   Future     Standing Expiration Date:   7/20/2019     Scheduling Instructions:      Bono     Order Specific Question:   Reason for Exam:     Answer:   screening mammogram   • Comprehensive Metabolic Panel     Standing Status:   Future     Standing Expiration Date:   7/21/2019   • Lipid Panel With LDL / HDL Ratio     Standing Status:   Future     Standing Expiration Date:   7/21/2019   • CBC & Differential     Standing Status:   Future     Standing Expiration Date:   7/21/2019     Order Specific Question:   Manual Differential     Answer:   No   1.  Annual sequential Medicare physical with screening mammogram: I have placed a referral to [facility] for mammogram.  Codie will be notified of test results when completed.  I'll try to obtain her updated immunizations from [facility] pharmacy as well.  States she has had immunizations there in the past year.  Codie refused colonoscopy at this time.  2.  Chronic and stable hyperlipidemia: I have given Codie written orders to have fasting blood work performed at [facility] that includes: CBC, CMP and a lipid profile.    Outpatient Encounter Prescriptions as of 7/20/2018   Medication Sig Dispense Refill   • esomeprazole (NexIUM) 40 MG capsule Take 40 mg by mouth every morning before breakfast.   "   • fluticasone (FLONASE) 50 MCG/ACT nasal spray 2 sprays into each nostril Daily.     • magnesium oxide (MAG-OX) 400 MG tablet Take 250 mg by mouth 2 (Two) Times a Day.     • Multiple Vitamin (MULTI VITAMIN DAILY PO) Take 1 tablet by mouth Daily.     • rosuvastatin (CRESTOR) 20 MG tablet Take 1 tablet by mouth Every Night. 90 tablet 3   • [DISCONTINUED] ALLERGY SERUM INJECTION Inject  under the skin 1 (One) Time.     • [DISCONTINUED] Psyllium (METAMUCIL FIBER PO) Take  by mouth.     • [DISCONTINUED] TURMERIC PO Take 1 tablet by mouth Daily.       No facility-administered encounter medications on file as of 7/20/2018.        Reviewed use of high risk medication in the elderly: yes  Reviewed for potential of harmful drug interactions in the elderly: yes    Follow Up:  No Follow-up on file.     An After Visit Summary and PPPS with all of these plans were given to the patient.

## 2018-07-24 ENCOUNTER — LAB (OUTPATIENT)
Dept: LAB | Facility: HOSPITAL | Age: 73
End: 2018-07-24

## 2018-07-24 DIAGNOSIS — E78.2 MIXED HYPERLIPIDEMIA: ICD-10-CM

## 2018-07-24 LAB
ALBUMIN SERPL-MCNC: 4 G/DL (ref 3.5–5.2)
ALBUMIN/GLOB SERPL: 1.5 G/DL
ALP SERPL-CCNC: 105 U/L (ref 40–129)
ALT SERPL W P-5'-P-CCNC: 15 U/L (ref 5–33)
ANION GAP SERPL CALCULATED.3IONS-SCNC: 8.9 MMOL/L
AST SERPL-CCNC: 15 U/L (ref 5–32)
BASOPHILS # BLD AUTO: 0.05 10*3/MM3 (ref 0–0.2)
BASOPHILS NFR BLD AUTO: 0.8 % (ref 0–2)
BILIRUB SERPL-MCNC: 0.3 MG/DL (ref 0.2–1.2)
BUN BLD-MCNC: 11 MG/DL (ref 8–23)
BUN/CREAT SERPL: 13.8 (ref 7–25)
CALCIUM SPEC-SCNC: 9.1 MG/DL (ref 8.8–10.5)
CHLORIDE SERPL-SCNC: 104 MMOL/L (ref 98–107)
CHOLEST SERPL-MCNC: 194 MG/DL (ref 0–200)
CO2 SERPL-SCNC: 30.1 MMOL/L (ref 22–29)
CREAT BLD-MCNC: 0.8 MG/DL (ref 0.57–1)
DEPRECATED RDW RBC AUTO: 42 FL (ref 37–54)
EOSINOPHIL # BLD AUTO: 0.2 10*3/MM3 (ref 0.1–0.3)
EOSINOPHIL NFR BLD AUTO: 3.2 % (ref 0–4)
ERYTHROCYTE [DISTWIDTH] IN BLOOD BY AUTOMATED COUNT: 13.2 % (ref 11.5–14.5)
GFR SERPL CREATININE-BSD FRML MDRD: 70 ML/MIN/1.73
GLOBULIN UR ELPH-MCNC: 2.6 GM/DL
GLUCOSE BLD-MCNC: 87 MG/DL (ref 65–99)
HCT VFR BLD AUTO: 41.7 % (ref 37–47)
HDLC SERPL-MCNC: 48 MG/DL (ref 40–60)
HGB BLD-MCNC: 13.5 G/DL (ref 12–16)
IMM GRANULOCYTES # BLD: 0.03 10*3/MM3 (ref 0–0.03)
IMM GRANULOCYTES NFR BLD: 0.5 % (ref 0–0.5)
LDLC SERPL CALC-MCNC: 107 MG/DL (ref 0–100)
LDLC/HDLC SERPL: 2.22 {RATIO}
LYMPHOCYTES # BLD AUTO: 2.17 10*3/MM3 (ref 0.6–4.8)
LYMPHOCYTES NFR BLD AUTO: 34.3 % (ref 20–45)
MCH RBC QN AUTO: 28.4 PG (ref 27–31)
MCHC RBC AUTO-ENTMCNC: 32.4 G/DL (ref 31–37)
MCV RBC AUTO: 87.6 FL (ref 81–99)
MONOCYTES # BLD AUTO: 0.32 10*3/MM3 (ref 0–1)
MONOCYTES NFR BLD AUTO: 5.1 % (ref 3–8)
NEUTROPHILS # BLD AUTO: 3.56 10*3/MM3 (ref 1.5–8.3)
NEUTROPHILS NFR BLD AUTO: 56.1 % (ref 45–70)
NRBC BLD MANUAL-RTO: 0 /100 WBC (ref 0–0)
PLATELET # BLD AUTO: 340 10*3/MM3 (ref 140–500)
PMV BLD AUTO: 9.7 FL (ref 7.4–10.4)
POTASSIUM BLD-SCNC: 3.9 MMOL/L (ref 3.5–5.2)
PROT SERPL-MCNC: 6.6 G/DL (ref 6–8.5)
RBC # BLD AUTO: 4.76 10*6/MM3 (ref 4.2–5.4)
SODIUM BLD-SCNC: 143 MMOL/L (ref 136–145)
TRIGL SERPL-MCNC: 197 MG/DL (ref 0–150)
VLDLC SERPL-MCNC: 39.4 MG/DL (ref 7–27)
WBC NRBC COR # BLD: 6.33 10*3/MM3 (ref 4.8–10.8)

## 2018-07-24 PROCEDURE — 80053 COMPREHEN METABOLIC PANEL: CPT

## 2018-07-24 PROCEDURE — 85025 COMPLETE CBC W/AUTO DIFF WBC: CPT

## 2018-07-24 PROCEDURE — 80061 LIPID PANEL: CPT

## 2018-07-27 ENCOUNTER — HOSPITAL ENCOUNTER (OUTPATIENT)
Dept: MAMMOGRAPHY | Facility: HOSPITAL | Age: 73
Discharge: HOME | End: 2018-07-27
Admitting: PHYSICIAN ASSISTANT

## 2018-07-27 DIAGNOSIS — Z12.31 SCREENING MAMMOGRAM, ENCOUNTER FOR: ICD-10-CM

## 2018-07-27 PROCEDURE — 77067 SCR MAMMO BI INCL CAD: CPT

## 2018-07-27 PROCEDURE — 77063 BREAST TOMOSYNTHESIS BI: CPT

## 2018-08-17 ENCOUNTER — OFFICE VISIT (OUTPATIENT)
Dept: CARDIOLOGY | Facility: CLINIC | Age: 73
End: 2018-08-17

## 2018-08-17 VITALS
HEIGHT: 64 IN | SYSTOLIC BLOOD PRESSURE: 112 MMHG | WEIGHT: 145.1 LBS | HEART RATE: 68 BPM | DIASTOLIC BLOOD PRESSURE: 62 MMHG | BODY MASS INDEX: 24.77 KG/M2

## 2018-08-17 DIAGNOSIS — E78.2 MIXED HYPERLIPIDEMIA: Primary | ICD-10-CM

## 2018-08-17 PROCEDURE — 99214 OFFICE O/P EST MOD 30 MIN: CPT | Performed by: INTERNAL MEDICINE

## 2018-08-17 PROCEDURE — 93000 ELECTROCARDIOGRAM COMPLETE: CPT | Performed by: INTERNAL MEDICINE

## 2018-08-17 NOTE — PROGRESS NOTES
Date of Office Visit: 2018  Encounter Provider: Anamika Lane MD  Place of Service: [facility] CARDIOLOGY  Patient Name: Codie Munson  :1945      Patient ID:  Codie Munson is a 72 y.o. female is here for  followup for hyperlipidemia, h/o CMP.         History of Present Illness    I originally saw her for complaints of dizziness, palpitations, and shortness of air in  . at that time she had a stress study which was abnormal with subsequent cardiac  catheterization at [facility] which showed no significant coronary artery disease.   Her left ventricular systolic function was normal. She however had an echocardiogram done  at the same time which showed an ejection fraction of 40%. Because of her lightheadedness  and dizziness, she had carotid duplex studies which showed minimal disease of the left  carotid artery. She then had repeat carotid duplex study performed on 2010  which showed no carotid artery disease.      Her last echocardiogram done in 2009 showed an ejection fraction of 54%, redundant  intra-atrial septum, a normal saline contrast study, grade I diastolic dysfunction, and no  significant valve abnormalities.      She had a bladder resuspension with Dr. Harish Arellano.      She was lightheaded and we decreased her losartan to 25 mg daily and she improved but then she worsened and so it was discontinued. She said the lightheadedness went away completely when she started wearing compression stockings which are knee high. She went to see Dr. Elliott, and he  did not think there was anything he could do with her left leg but she has continued  aching in that leg and she is wearing compression stockings daily. I did get the letter from Dr. Elliott's office which I reviewed. It says that the patient  has evidence of reflux of the lesser and greater saphenous veins. They recommended  compression stockings because she was not very  symptomatic. This was dictated on  2014.     Her echocardiogram which was done 2016, and this showed ejection fraction of 60% with grade I diastolic dysfunction, normal segmental wall motion and no significant valve disease.        She had a normal stress nuclear study and vascular screening done 2017.  She had laboratory values done 2018 turned her triglycerides 197, , normal CMP and CBC.  She has some dizziness due to her decongestant.  She's been having a lot of mucous and drainage.  She's had no fevers, chills.  She had a little bit for cough.  She has no chest pain or pressure.  She's had no heart racing or syncope.  She denies exertional chest pressure.  She has severe reaction to this shingles injection and I recommended that she not do it again.    Past Medical History:   Diagnosis Date   • Cardiomyopathy (CMS/HCC)    • Chronic cystitis    • Cough    • Depression    • GERD (gastroesophageal reflux disease)    • Hyperlipidemia    • Hypertension    • Scarlet fever    • Strain of thoracic region    • Urethral prolapse    • URI (upper respiratory infection)    • Urinary retention          Past Surgical History:   Procedure Laterality Date   • APPENDECTOMY     • BLADDER SURGERY     • CARDIAC CATHETERIZATION      Normal. Performed at [facility].   •  SECTION         Current Outpatient Prescriptions on File Prior to Visit   Medication Sig Dispense Refill   • esomeprazole (NexIUM) 40 MG capsule Take 40 mg by mouth every morning before breakfast.     • fluticasone (FLONASE) 50 MCG/ACT nasal spray 2 sprays into each nostril Daily.     • magnesium oxide (MAG-OX) 400 MG tablet Take 250 mg by mouth 2 (Two) Times a Day.     • Multiple Vitamin (MULTI VITAMIN DAILY PO) Take 1 tablet by mouth Daily.     • rosuvastatin (CRESTOR) 20 MG tablet Take 1 tablet by mouth Every Night. 90 tablet 3     No current facility-administered medications on file prior to visit.        Social History  "    Social History   • Marital status:      Spouse name: N/A   • Number of children: N/A   • Years of education: N/A     Occupational History   • Not on file.     Social History Main Topics   • Smoking status: Never Smoker   • Smokeless tobacco: Never Used   • Alcohol use No   • Drug use: Unknown   • Sexual activity: Defer     Other Topics Concern   • Not on file     Social History Narrative   • No narrative on file           Review of Systems   Constitution: Negative.   HENT: Negative for congestion.    Eyes: Negative for vision loss in left eye and vision loss in right eye.   Respiratory: Negative.  Negative for cough, hemoptysis, shortness of breath, sleep disturbances due to breathing, snoring, sputum production and wheezing.    Endocrine: Negative.    Hematologic/Lymphatic: Negative.    Skin: Negative for poor wound healing and rash.   Musculoskeletal: Negative for falls, gout, muscle cramps and myalgias.   Gastrointestinal: Negative for abdominal pain, diarrhea, dysphagia, hematemesis, melena, nausea and vomiting.   Neurological: Negative for excessive daytime sleepiness, dizziness, headaches, light-headedness, loss of balance, seizures and vertigo.   Psychiatric/Behavioral: Negative for depression and substance abuse. The patient is not nervous/anxious.        Procedures    ECG 12 Lead  Date/Time: 8/17/2018 10:49 AM  Performed by: KADIE HIDALGO  Authorized by: KADIE HIDALGO   Comparison: compared with previous ECG   Similar to previous ECG  Rhythm: sinus rhythm  ST Depression: II, III and aVF  T flattening: V3, V4, V5 and V6  Clinical impression: abnormal ECG                Objective:      Vitals:    08/17/18 1025   BP: 112/62   BP Location: Right arm   Patient Position: Sitting   Pulse: 68   Weight: 65.8 kg (145 lb 1.6 oz)   Height: 162.6 cm (64\")     Body mass index is 24.91 kg/m².    Physical Exam   Constitutional: She is oriented to person, place, and time. She appears well-developed " and well-nourished. No distress.   HENT:   Head: Normocephalic and atraumatic.   Eyes: Conjunctivae are normal. No scleral icterus.   Neck: Neck supple. No JVD present. Carotid bruit is not present. No thyromegaly present.   Cardiovascular: Normal rate, regular rhythm, S1 normal, S2 normal, normal heart sounds and intact distal pulses.   No extrasystoles are present. PMI is not displaced.  Exam reveals no gallop.    No murmur heard.  Pulses:       Carotid pulses are 2+ on the right side, and 2+ on the left side.       Radial pulses are 2+ on the right side, and 2+ on the left side.        Dorsalis pedis pulses are 2+ on the right side, and 2+ on the left side.        Posterior tibial pulses are 2+ on the right side, and 2+ on the left side.   Pulmonary/Chest: Effort normal and breath sounds normal. No respiratory distress. She has no wheezes. She has no rhonchi. She has no rales. She exhibits no tenderness.   Abdominal: Soft. Bowel sounds are normal. She exhibits no distension, no abdominal bruit and no mass. There is no tenderness.   Musculoskeletal: She exhibits no edema or deformity.   Lymphadenopathy:     She has no cervical adenopathy.   Neurological: She is alert and oriented to person, place, and time. No cranial nerve deficit.   Skin: Skin is warm and dry. No rash noted. She is not diaphoretic. No cyanosis. No pallor. Nails show no clubbing.   Psychiatric: She has a normal mood and affect. Judgment normal.   Vitals reviewed.      Lab Review:       Assessment:      Diagnosis Plan   1. Essential hypertension     2. Mixed hyperlipidemia          1. Hyperlipidemia, on Crestor and fish oil. Well controlled.   2. Hypertension. No meds, just watch for now.   3. Normal renal arteriogram in 2002.   4. Normal cardiac catheterization in 2005.   5. Venous varicosities. See Dr. Perrin.  6. Numbness of her third toe on her right foot. Stable.   7. Recent bladder resuspension with Dr. Harish Arellano.  8. Allergies, getting  allergy shots, sees Dr. Landreos.        Plan:       See back in 1 year, no changes.  Ok to use allegra.

## 2018-08-27 RX ORDER — ROSUVASTATIN CALCIUM 20 MG/1
TABLET, COATED ORAL
Qty: 90 TABLET | Refills: 2 | Status: SHIPPED | OUTPATIENT
Start: 2018-08-27 | End: 2019-07-24

## 2018-09-17 ENCOUNTER — OFFICE VISIT (OUTPATIENT)
Dept: FAMILY MEDICINE CLINIC | Facility: CLINIC | Age: 73
End: 2018-09-17

## 2018-09-17 VITALS
SYSTOLIC BLOOD PRESSURE: 110 MMHG | RESPIRATION RATE: 16 BRPM | TEMPERATURE: 97.9 F | DIASTOLIC BLOOD PRESSURE: 78 MMHG | OXYGEN SATURATION: 98 % | BODY MASS INDEX: 24.24 KG/M2 | WEIGHT: 142 LBS | HEIGHT: 64 IN | HEART RATE: 110 BPM

## 2018-09-17 DIAGNOSIS — M25.562 LEFT KNEE PAIN, UNSPECIFIED CHRONICITY: ICD-10-CM

## 2018-09-17 DIAGNOSIS — R10.30 LOWER ABDOMINAL PAIN: Primary | ICD-10-CM

## 2018-09-17 DIAGNOSIS — R31.9 HEMATURIA, UNSPECIFIED TYPE: ICD-10-CM

## 2018-09-17 LAB
ALBUMIN SERPL-MCNC: 4.6 G/DL (ref 3.5–5.2)
ALBUMIN/GLOB SERPL: 1.8 G/DL
ALP SERPL-CCNC: 116 U/L (ref 40–129)
ALT SERPL-CCNC: 22 U/L (ref 5–33)
AST SERPL-CCNC: 19 U/L (ref 5–32)
BASOPHILS # BLD AUTO: 0.06 10*3/MM3 (ref 0–0.2)
BASOPHILS NFR BLD AUTO: 0.5 % (ref 0–2)
BILIRUB BLD-MCNC: NEGATIVE MG/DL
BILIRUB SERPL-MCNC: 0.4 MG/DL (ref 0.2–1.2)
BUN SERPL-MCNC: 15 MG/DL (ref 8–23)
BUN/CREAT SERPL: 19.5 (ref 7–25)
CALCIUM SERPL-MCNC: 9.4 MG/DL (ref 8.8–10.5)
CHLORIDE SERPL-SCNC: 101 MMOL/L (ref 98–107)
CLARITY, POC: ABNORMAL
CO2 SERPL-SCNC: 29 MMOL/L (ref 22–29)
COLOR UR: YELLOW
CREAT SERPL-MCNC: 0.77 MG/DL (ref 0.57–1)
EOSINOPHIL # BLD AUTO: 0.1 10*3/MM3 (ref 0.1–0.3)
EOSINOPHIL NFR BLD AUTO: 0.9 % (ref 0–4)
ERYTHROCYTE [DISTWIDTH] IN BLOOD BY AUTOMATED COUNT: 13.2 % (ref 11.5–14.5)
GLOBULIN SER CALC-MCNC: 2.6 GM/DL
GLUCOSE SERPL-MCNC: 86 MG/DL (ref 65–99)
GLUCOSE UR STRIP-MCNC: NEGATIVE MG/DL
HCT VFR BLD AUTO: 40.6 % (ref 37–47)
HGB BLD-MCNC: 13.1 G/DL (ref 12–16)
IMM GRANULOCYTES # BLD: 0.03 10*3/MM3 (ref 0–0.03)
IMM GRANULOCYTES NFR BLD: 0.3 % (ref 0–0.5)
KETONES UR QL: NEGATIVE
LEUKOCYTE EST, POC: ABNORMAL
LYMPHOCYTES # BLD AUTO: 1.51 10*3/MM3 (ref 0.6–4.8)
LYMPHOCYTES NFR BLD AUTO: 13.2 % (ref 20–45)
MCH RBC QN AUTO: 28.5 PG (ref 27–31)
MCHC RBC AUTO-ENTMCNC: 32.3 G/DL (ref 31–37)
MCV RBC AUTO: 88.5 FL (ref 81–99)
MONOCYTES # BLD AUTO: 0.59 10*3/MM3 (ref 0–1)
MONOCYTES NFR BLD AUTO: 5.2 % (ref 3–8)
NEUTROPHILS # BLD AUTO: 9.13 10*3/MM3 (ref 1.5–8.3)
NEUTROPHILS NFR BLD AUTO: 79.9 % (ref 45–70)
NITRITE UR-MCNC: NEGATIVE MG/ML
NRBC BLD AUTO-RTO: 0 /100 WBC (ref 0–0)
PH UR: 6 [PH] (ref 5–8)
PLATELET # BLD AUTO: 323 10*3/MM3 (ref 140–500)
POTASSIUM SERPL-SCNC: 4.2 MMOL/L (ref 3.5–5.2)
PROT SERPL-MCNC: 7.2 G/DL (ref 6–8.5)
PROT UR STRIP-MCNC: ABNORMAL MG/DL
RBC # BLD AUTO: 4.59 10*6/MM3 (ref 4.2–5.4)
RBC # UR STRIP: ABNORMAL /UL
SODIUM SERPL-SCNC: 141 MMOL/L (ref 136–145)
SP GR UR: 1.02 (ref 1–1.03)
UROBILINOGEN UR QL: NORMAL
WBC # BLD AUTO: 11.42 10*3/MM3 (ref 4.8–10.8)

## 2018-09-17 PROCEDURE — 81002 URINALYSIS NONAUTO W/O SCOPE: CPT | Performed by: PHYSICIAN ASSISTANT

## 2018-09-17 PROCEDURE — 99213 OFFICE O/P EST LOW 20 MIN: CPT | Performed by: PHYSICIAN ASSISTANT

## 2018-09-17 RX ORDER — METRONIDAZOLE 500 MG/1
500 TABLET ORAL 3 TIMES DAILY
Qty: 21 TABLET | Refills: 0 | Status: SHIPPED | OUTPATIENT
Start: 2018-09-17 | End: 2018-10-09

## 2018-09-17 RX ORDER — CIPROFLOXACIN 500 MG/1
500 TABLET, FILM COATED ORAL 2 TIMES DAILY
Qty: 14 TABLET | Refills: 0 | Status: SHIPPED | OUTPATIENT
Start: 2018-09-17 | End: 2018-10-09

## 2018-09-17 NOTE — PROGRESS NOTES
Subjective   Codie Munson is a 73 y.o. female.   Chief Complaint   Patient presents with   • Abdominal Pain       History of Present Illness     Codie is a 73 year old female who presents with  Lower abdomen pain for the past day.  She has had some nausea. Codie describes the pain as stabbing to throbbing pain  That is constant.  Denied any dysuria,urine frequency,fevers,chills,vomiting,diarrhea or urine hesitancy.  She ate popcorn yesterday. She has had a history of diverticulosis in the past.  Bowel movements are daily without dark tarry stools.   She has not taken any OTC medications for her symptoms.  Appetite and sleep has been normal.    States while in the [facility] this summer, she said her luggage hit her left knee.  States she's been having left knee pain with swelling off and on.  She would like to see an orthopedist.  States the knee does not give out on her.  She has been applying ice to the area as needed.  Denied any recent falls.    The following portions of the patient's history were reviewed and updated as appropriate: allergies, current medications, past family history, past medical history, past social history and past surgical history.    Review of Systems   Constitutional: Negative.  Negative for chills, fatigue and fever.   HENT: Negative.    Eyes: Negative.    Respiratory: Negative.    Cardiovascular: Negative.    Gastrointestinal: Positive for abdominal pain and nausea. Negative for constipation, diarrhea and vomiting.   Endocrine: Negative.    Genitourinary: Negative.  Negative for decreased urine volume, dysuria, flank pain, frequency, hematuria, pelvic pain, urgency, vaginal bleeding and vaginal discharge.   Musculoskeletal: Negative.    Skin: Negative.    Allergic/Immunologic: Negative.    Neurological: Negative.    Hematological: Negative.    Psychiatric/Behavioral: Negative.    All other systems reviewed and are negative.    Vitals:    09/17/18 1411   BP: 110/78   BP Location:  "Right arm   Patient Position: Sitting   Cuff Size: Adult   Pulse: 110   Resp: 16   Temp: 97.9 °F (36.6 °C)   TempSrc: Oral   SpO2: 98%   Weight: 64.4 kg (142 lb)   Height: 162.6 cm (64\")     Body mass index is 24.37 kg/m².  Allergies   Allergen Reactions   • Augmentin [Amoxicillin-Pot Clavulanate] Rash   • Biaxin [Clarithromycin] Rash       Objective   Physical Exam   Constitutional: She is oriented to person, place, and time. Vital signs are normal. She appears well-developed and well-nourished.   Neck: Trachea normal and phonation normal. Neck supple. No edema present.   Cardiovascular: Normal rate, regular rhythm, S1 normal, S2 normal, normal heart sounds and normal pulses.    Pulmonary/Chest: Effort normal and breath sounds normal.   Abdominal: Soft. Normal appearance and bowel sounds are normal. There is no hepatomegaly. There is tenderness in the suprapubic area. There is no rigidity, no rebound, no guarding, no CVA tenderness, no tenderness at McBurney's point and negative Martin's sign.       Musculoskeletal:        Left knee: She exhibits swelling. She exhibits normal range of motion, no effusion, no bony tenderness and normal meniscus. Tenderness found.        Legs:  Neurological: She is alert and oriented to person, place, and time.   Skin: Skin is warm, dry and intact. Capillary refill takes less than 2 seconds.   Psychiatric: She has a normal mood and affect. Her speech is normal and behavior is normal. Judgment and thought content normal. Cognition and memory are normal.       Assessment/Plan   Codie was seen today for abdominal pain.    Diagnoses and all orders for this visit:    Lower abdominal pain  -     CT Abdomen Pelvis With Contrast; Future  -     CBC & Differential  -     Comprehensive Metabolic Panel  -     ciprofloxacin (CIPRO) 500 MG tablet; Take 1 tablet by mouth 2 (Two) Times a Day.  -     metroNIDAZOLE (FLAGYL) 500 MG tablet; Take 1 tablet by mouth 3 (Three) Times a Day.    Hematuria, " unspecified type  -     POCT urinalysis dipstick, manual  -     Urine Culture - Urine, Urine, Clean Catch  -     CT Abdomen Pelvis With Contrast; Future  -     ciprofloxacin (CIPRO) 500 MG tablet; Take 1 tablet by mouth 2 (Two) Times a Day.    Left knee pain, unspecified chronicity  -     Ambulatory Referral to Orthopedic Surgery    1.  New lower abdominal pain with new hematuria: In office urinalysis showed white cells and red cells.  A urine culture was sent to the laboratory for further evaluation.  She is very tender along the left lower quadrant and lower abdomen area.  I suspect she may have a diverticulosis/diverticulitis flare as well.  I will schedule a CT of abdomen and pelvis for further evaluation.  I have prescribed Cipro and Flagyl antibiotic's to pharmacy.  Codie was instructed to do a bland diet for the next 24-48 hours.  She'll be notified of test results when completed.  I've instructed Codie if her symptoms worsen, she is to go to the nearest emergency room.  She voiced understanding.  2.  New left knee pain: Codie has had left knee pain off and on for the past month or so.  States a piece of luggage hit her knee while visiting the [facility] this summer.  I will schedule a referral to orthopedist for further evaluation.            Results for orders placed or performed in visit on 09/17/18   POCT urinalysis dipstick, manual   Result Value Ref Range    Color Yellow Yellow, Straw, Dark Yellow, Malena    Clarity, UA Cloudy (A) Clear    Glucose, UA Negative Negative, 1000 mg/dL (3+) mg/dL    Bilirubin Negative Negative    Ketones, UA Negative Negative    Specific Gravity  1.025 1.005 - 1.030    Blood, UA Large (A) Negative    pH, Urine 6.0 5.0 - 8.0    Protein, POC Trace (A) Negative mg/dL    Urobilinogen, UA Normal Normal    Leukocytes Small (1+) (A) Negative    Nitrite, UA Negative Negative

## 2018-09-19 LAB
BACTERIA UR CULT: NORMAL
BACTERIA UR CULT: NORMAL

## 2018-09-21 ENCOUNTER — HOSPITAL ENCOUNTER (OUTPATIENT)
Dept: CT IMAGING | Facility: HOSPITAL | Age: 73
Discharge: HOME | End: 2018-09-21
Admitting: PHYSICIAN ASSISTANT

## 2018-09-21 DIAGNOSIS — R31.9 HEMATURIA, UNSPECIFIED TYPE: ICD-10-CM

## 2018-09-21 DIAGNOSIS — R10.30 LOWER ABDOMINAL PAIN: ICD-10-CM

## 2018-09-21 PROCEDURE — 74177 CT ABD & PELVIS W/CONTRAST: CPT

## 2018-09-21 PROCEDURE — 0 IOPAMIDOL PER 1 ML: Performed by: PHYSICIAN ASSISTANT

## 2018-09-21 PROCEDURE — 0 DIATRIZOATE MEGLUMINE & SODIUM PER 1 ML: Performed by: PHYSICIAN ASSISTANT

## 2018-09-21 RX ADMIN — IOPAMIDOL 100 ML: 755 INJECTION, SOLUTION INTRAVENOUS at 15:19

## 2018-09-21 RX ADMIN — DIATRIZOATE MEGLUMINE AND DIATRIZOATE SODIUM 30 ML: 600; 100 SOLUTION ORAL; RECTAL at 14:50

## 2018-10-05 DIAGNOSIS — N39.0 CHRONIC UTI: Primary | ICD-10-CM

## 2018-10-08 LAB
BACTERIA UR CULT: NORMAL
BACTERIA UR CULT: NORMAL

## 2018-10-09 ENCOUNTER — OFFICE VISIT (OUTPATIENT)
Dept: ORTHOPEDIC SURGERY | Facility: CLINIC | Age: 73
End: 2018-10-09

## 2018-10-09 VITALS
HEART RATE: 92 BPM | SYSTOLIC BLOOD PRESSURE: 130 MMHG | HEIGHT: 64 IN | DIASTOLIC BLOOD PRESSURE: 72 MMHG | BODY MASS INDEX: 24.24 KG/M2 | WEIGHT: 142 LBS

## 2018-10-09 DIAGNOSIS — R52 PAIN: Primary | ICD-10-CM

## 2018-10-09 DIAGNOSIS — M22.42 CHONDROMALACIA OF PATELLOFEMORAL JOINT, LEFT: ICD-10-CM

## 2018-10-09 PROCEDURE — 73562 X-RAY EXAM OF KNEE 3: CPT | Performed by: ORTHOPAEDIC SURGERY

## 2018-10-09 PROCEDURE — 99203 OFFICE O/P NEW LOW 30 MIN: CPT | Performed by: ORTHOPAEDIC SURGERY

## 2018-10-09 RX ORDER — MELOXICAM 7.5 MG/1
7.5 TABLET ORAL DAILY
Qty: 30 TABLET | Refills: 0 | Status: SHIPPED | OUTPATIENT
Start: 2018-10-09 | End: 2018-12-31

## 2018-10-09 NOTE — PROGRESS NOTES
Subjective:     Patient ID: Codie Munson is a 73 y.o. female.    Chief Complaint:  Left knee pain, new issue to examiner  History of Present Illness  Codie Munson presents to clinic today for evaluation of left knee, noted acute onset of anterior left knee pain after she fell down over her suitcase and landed onto anterior aspect of her left knee on , she has noted moderate increase in function as well as significant decrease in pain over the last 2-3 weeks, mild residual pain over the anterior knee rates as a 1-2 out of 10 in aching in nature, primarily only notes with kneeling directly onto the knee has some slight burning sensation noted.  Mild improvement with rest, activity modification, anti-inflammatory medications.  Denies any monica locking or catching the knee, denies history of dislocation.  No significant swelling.  Denies any radiation of pain, denies associated numbness or tingling.     Social History     Occupational History   • Not on file.     Social History Main Topics   • Smoking status: Never Smoker   • Smokeless tobacco: Never Used   • Alcohol use No   • Drug use: Unknown   • Sexual activity: Defer      Past Medical History:   Diagnosis Date   • Cardiomyopathy (CMS/HCC)    • Chronic cystitis    • Cough    • Depression    • GERD (gastroesophageal reflux disease)    • Hyperlipidemia    • Hypertension    • Scarlet fever    • Strain of thoracic region    • Urethral prolapse    • URI (upper respiratory infection)    • Urinary retention      Past Surgical History:   Procedure Laterality Date   • APPENDECTOMY     • BLADDER SURGERY     • CARDIAC CATHETERIZATION      Normal. Performed at [facility].   •  SECTION         Family History   Problem Relation Age of Onset   • Heart failure Mother    • Cancer Mother         Bladder   • Kidney failure Mother    • Aneurysm Father    • Heart disease Father         CABG   • Stroke Father    • Crohn's disease Sister    • Prostate cancer  "Brother    • Cancer Brother         bladder   • Breast cancer Neg Hx          Review of Systems   Constitutional: Negative for chills, diaphoresis, fever and unexpected weight change.   HENT: Negative for hearing loss, nosebleeds, sore throat and tinnitus.    Eyes: Negative for pain and visual disturbance.   Respiratory: Negative for cough, shortness of breath and wheezing.    Cardiovascular: Negative for chest pain and palpitations.   Gastrointestinal: Negative for abdominal pain, diarrhea, nausea and vomiting.   Endocrine: Negative for cold intolerance, heat intolerance and polydipsia.   Genitourinary: Negative for difficulty urinating, dysuria and hematuria.   Musculoskeletal: Positive for arthralgias, joint swelling and myalgias.   Skin: Negative for rash and wound.   Allergic/Immunologic: Negative for environmental allergies.   Neurological: Negative for dizziness, syncope and numbness.   Hematological: Does not bruise/bleed easily.   Psychiatric/Behavioral: Negative for dysphoric mood and sleep disturbance. The patient is not nervous/anxious.            Objective:  Vitals:    10/09/18 1434   BP: 130/72   BP Location: Left arm   Pulse: 92   Weight: 64.4 kg (142 lb)   Height: 162.6 cm (64\")     1    10/09/18  1434   Weight: 64.4 kg (142 lb)     Body mass index is 24.37 kg/m².  Physical Exam    Vital signs reviewed.   General: No acute distress, alert and oriented  Eyes: conjunctiva clear; pupils equally round and reactive  ENT: external ears and nose atraumatic; oropharynx clear  CV: no peripheral edema  Resp: normal respiratory effort  Skin: no rashes or wounds; normal turgor  Psych: mood and affect appropriate; recent and remote memory intact          Ortho Exam     Left knee-active range of motion 0-135°, 4+ out of 5 strength on flexion and extension, mild tenderness superficially over the distal pole of patella with mildly positive Tinel's in this region, no evidence of erythema or fluctuance.  Grade 1A " Lachman, negative anterior posterior drawer, no joint line pain, no effusion.  Stable to varus and valgus stress 0 and 30°.  Positive sensation light touch all distibution's left lower extremity symmetric right, brisk cap refill all digits, 2+ dorsalis pedis pulse.    Imaging:  Left Knee X-Ray  Indication: Pain    AP, Lateral, and [place] views    Findings:  No fracture  No bony lesion  Normal soft tissues  Moderate patellofemoral joint space narrowing    No prior studies were available for comparison.    Assessment:        1. Pain    2. Chondromalacia of patellofemoral joint, left           Plan:          Discussed treatment options at length with patient at today's visit.  Given the fact that pain is improved at this point time we will proceed with use of meloxicam try to help with her pain, soft tissue massage anteriorly, can consider use of knee sleeve.  Recommended home exercises for hip and core strengthening.  If pain continues may consider advanced imaging versus intra-articular injection.    Codie Munson was in agreement with plan and had all questions answered.     Orders:  Orders Placed This Encounter   Procedures   • XR Knee 3 View Left       Medications:  New Medications Ordered This Visit   Medications   • meloxicam (MOBIC) 7.5 MG tablet     Sig: Take 1 tablet by mouth Daily.     Dispense:  30 tablet     Refill:  0       Followup:  Return if symptoms worsen or fail to improve.    Codie was seen today for pain and edema.    Diagnoses and all orders for this visit:    Pain  -     XR Knee 3 View Left    Chondromalacia of patellofemoral joint, left    Other orders  -     meloxicam (MOBIC) 7.5 MG tablet; Take 1 tablet by mouth Daily.          Dictated utilizing Dragon dictation

## 2018-12-31 ENCOUNTER — OFFICE VISIT (OUTPATIENT)
Dept: FAMILY MEDICINE CLINIC | Facility: CLINIC | Age: 73
End: 2018-12-31

## 2018-12-31 ENCOUNTER — HOSPITAL ENCOUNTER (OUTPATIENT)
Dept: ULTRASOUND IMAGING | Facility: HOSPITAL | Age: 73
Discharge: HOME | End: 2018-12-31
Admitting: PHYSICIAN ASSISTANT

## 2018-12-31 VITALS
OXYGEN SATURATION: 98 % | TEMPERATURE: 98.2 F | WEIGHT: 148 LBS | RESPIRATION RATE: 16 BRPM | HEART RATE: 74 BPM | DIASTOLIC BLOOD PRESSURE: 74 MMHG | SYSTOLIC BLOOD PRESSURE: 128 MMHG | BODY MASS INDEX: 25.27 KG/M2 | HEIGHT: 64 IN

## 2018-12-31 DIAGNOSIS — M25.572 ACUTE LEFT ANKLE PAIN: Primary | ICD-10-CM

## 2018-12-31 DIAGNOSIS — M79.89 PAIN AND SWELLING OF LEFT LOWER LEG: ICD-10-CM

## 2018-12-31 DIAGNOSIS — E78.2 MIXED HYPERLIPIDEMIA: ICD-10-CM

## 2018-12-31 DIAGNOSIS — M79.662 PAIN AND SWELLING OF LEFT LOWER LEG: ICD-10-CM

## 2018-12-31 DIAGNOSIS — I83.93 VARICOSE VEINS OF BOTH LOWER EXTREMITIES, UNSPECIFIED WHETHER COMPLICATED: ICD-10-CM

## 2018-12-31 DIAGNOSIS — R31.9 HEMATURIA, UNSPECIFIED TYPE: ICD-10-CM

## 2018-12-31 DIAGNOSIS — N39.0 CHRONIC UTI: ICD-10-CM

## 2018-12-31 LAB
ALBUMIN SERPL-MCNC: 4.3 G/DL (ref 3.5–5.2)
ALBUMIN/GLOB SERPL: 1.7 G/DL
ALP SERPL-CCNC: 101 U/L (ref 40–129)
ALT SERPL-CCNC: 21 U/L (ref 5–33)
AST SERPL-CCNC: 21 U/L (ref 5–32)
BASOPHILS # BLD AUTO: 0.06 10*3/MM3 (ref 0–0.2)
BASOPHILS NFR BLD AUTO: 1.1 % (ref 0–2)
BILIRUB BLD-MCNC: NEGATIVE MG/DL
BILIRUB SERPL-MCNC: 0.4 MG/DL (ref 0.2–1.2)
BUN SERPL-MCNC: 10 MG/DL (ref 8–23)
BUN/CREAT SERPL: 11 (ref 7–25)
CALCIUM SERPL-MCNC: 9 MG/DL (ref 8.8–10.5)
CHLORIDE SERPL-SCNC: 103 MMOL/L (ref 98–107)
CHOLEST SERPL-MCNC: 163 MG/DL (ref 0–200)
CLARITY, POC: CLEAR
CO2 SERPL-SCNC: 30.7 MMOL/L (ref 22–29)
COLOR UR: YELLOW
CREAT SERPL-MCNC: 0.91 MG/DL (ref 0.57–1)
EOSINOPHIL # BLD AUTO: 0.17 10*3/MM3 (ref 0.1–0.3)
EOSINOPHIL NFR BLD AUTO: 3.2 % (ref 0–4)
ERYTHROCYTE [DISTWIDTH] IN BLOOD BY AUTOMATED COUNT: 13.2 % (ref 11.5–14.5)
GLOBULIN SER CALC-MCNC: 2.6 GM/DL
GLUCOSE SERPL-MCNC: 84 MG/DL (ref 65–99)
GLUCOSE UR STRIP-MCNC: NEGATIVE MG/DL
HCT VFR BLD AUTO: 43 % (ref 37–47)
HDLC SERPL-MCNC: 52 MG/DL (ref 40–60)
HGB BLD-MCNC: 13.5 G/DL (ref 12–16)
IMM GRANULOCYTES # BLD: 0.01 10*3/MM3 (ref 0–0.03)
IMM GRANULOCYTES NFR BLD: 0.2 % (ref 0–0.5)
KETONES UR QL: NEGATIVE
LDLC SERPL CALC-MCNC: 78 MG/DL (ref 0–100)
LDLC/HDLC SERPL: 1.5 {RATIO}
LEUKOCYTE EST, POC: ABNORMAL
LYMPHOCYTES # BLD AUTO: 1.48 10*3/MM3 (ref 0.6–4.8)
LYMPHOCYTES NFR BLD AUTO: 28.2 % (ref 20–45)
MCH RBC QN AUTO: 28.2 PG (ref 27–31)
MCHC RBC AUTO-ENTMCNC: 31.4 G/DL (ref 31–37)
MCV RBC AUTO: 89.8 FL (ref 81–99)
MONOCYTES # BLD AUTO: 0.35 10*3/MM3 (ref 0–1)
MONOCYTES NFR BLD AUTO: 6.7 % (ref 3–8)
NEUTROPHILS # BLD AUTO: 3.18 10*3/MM3 (ref 1.5–8.3)
NEUTROPHILS NFR BLD AUTO: 60.6 % (ref 45–70)
NITRITE UR-MCNC: NEGATIVE MG/ML
NRBC BLD AUTO-RTO: 0 /100 WBC (ref 0–0)
PH UR: 7 [PH] (ref 5–8)
PLATELET # BLD AUTO: 336 10*3/MM3 (ref 140–500)
POTASSIUM SERPL-SCNC: 4.5 MMOL/L (ref 3.5–5.2)
PROT SERPL-MCNC: 6.9 G/DL (ref 6–8.5)
PROT UR STRIP-MCNC: ABNORMAL MG/DL
RBC # BLD AUTO: 4.79 10*6/MM3 (ref 4.2–5.4)
RBC # UR STRIP: ABNORMAL /UL
SODIUM SERPL-SCNC: 143 MMOL/L (ref 136–145)
SP GR UR: 1.01 (ref 1–1.03)
TRIGL SERPL-MCNC: 165 MG/DL (ref 0–150)
UROBILINOGEN UR QL: NORMAL
VLDLC SERPL CALC-MCNC: 33 MG/DL (ref 7–27)
WBC # BLD AUTO: 5.25 10*3/MM3 (ref 4.8–10.8)

## 2018-12-31 PROCEDURE — 99214 OFFICE O/P EST MOD 30 MIN: CPT | Performed by: PHYSICIAN ASSISTANT

## 2018-12-31 PROCEDURE — 81002 URINALYSIS NONAUTO W/O SCOPE: CPT | Performed by: PHYSICIAN ASSISTANT

## 2018-12-31 PROCEDURE — 93971 EXTREMITY STUDY: CPT

## 2018-12-31 RX ORDER — CHOLECALCIFEROL (VITAMIN D3) 125 MCG
CAPSULE ORAL
COMMUNITY
End: 2022-09-09

## 2018-12-31 NOTE — PROGRESS NOTES
Subjective   Codie Munson is a 73 y.o. female presents for   Chief Complaint   Patient presents with   • Edema     left foot   • Urinary Tract Infection     chronic       History of Present Illness     Codie is a 73-year-old female who presents with left foot swelling for the past week. Codie has gained 6 pounds since October 2018.She stands a lot but denied any falls/injury.  She wears compression hose off and on.  Denied any chest pain,shortness of air,wheezing or dizziness.      Codie has had 6 UT's this years.  She has seen fredy Simmons. In past for Hysterectomy and bladder sling in 2012.  She has seen Dr. Black  In 2015 for frequent URI.  She was sent to Physical Therapist for bladder exercises.  Denied any dysuria,urine urgency,frequency or hesitancy.   She has been drinking 64 oz of water a day.      She is fasting and need labs for her cholesterol issues.        The following portions of the patient's history were reviewed and updated as appropriate: allergies, current medications, past family history, past medical history, past social history, past surgical history and problem list.    Review of Systems   Constitutional: Negative.    HENT: Negative.    Eyes: Negative.    Respiratory: Negative.  Negative for cough, shortness of breath and wheezing.    Cardiovascular: Positive for leg swelling. Negative for chest pain and palpitations.   Gastrointestinal: Negative.    Endocrine: Negative.    Genitourinary: Negative.  Negative for decreased urine volume, difficulty urinating, dysuria, frequency, hematuria, urgency, vaginal bleeding, vaginal discharge and vaginal pain.   Musculoskeletal: Negative.         Left foot/ankle/leg pain and swelling.   Skin: Negative.    Allergic/Immunologic: Negative.    Neurological: Negative.    Hematological: Negative.    Psychiatric/Behavioral: Negative.    All other systems reviewed and are negative.        Vitals:    12/31/18 0838   BP: 128/74   BP Location: Left  "arm   Patient Position: Sitting   Cuff Size: Adult   Pulse: 74   Resp: 16   Temp: 98.2 °F (36.8 °C)   TempSrc: Oral   SpO2: 98%   Weight: 67.1 kg (148 lb)   Height: 162.6 cm (64\")     Wt Readings from Last 3 Encounters:   12/31/18 67.1 kg (148 lb)   10/09/18 64.4 kg (142 lb)   09/17/18 64.4 kg (142 lb)       BP Readings from Last 3 Encounters:   12/31/18 128/74   10/09/18 130/72   09/17/18 110/78       Social History     Socioeconomic History   • Marital status:      Spouse name: Not on file   • Number of children: Not on file   • Years of education: Not on file   • Highest education level: Not on file   Social Needs   • Financial resource strain: Not on file   • Food insecurity - worry: Not on file   • Food insecurity - inability: Not on file   • Transportation needs - medical: Not on file   • Transportation needs - non-medical: Not on file   Occupational History   • Not on file   Tobacco Use   • Smoking status: Never Smoker   • Smokeless tobacco: Never Used   Substance and Sexual Activity   • Alcohol use: No   • Drug use: Defer   • Sexual activity: Defer   Other Topics Concern   • Not on file   Social History Narrative   • Not on file       Allergies   Allergen Reactions   • Augmentin [Amoxicillin-Pot Clavulanate] Rash   • Biaxin [Clarithromycin] Rash       Body mass index is 25.4 kg/m².    Objective   Physical Exam   Constitutional: She is oriented to person, place, and time. Vital signs are normal. She appears well-developed and well-nourished.   Neck: Trachea normal and phonation normal.   Cardiovascular: Normal rate, regular rhythm, S1 normal, S2 normal, normal heart sounds and normal pulses.   Pulmonary/Chest: Effort normal and breath sounds normal.   Abdominal: Soft. Normal appearance, normal aorta and bowel sounds are normal. There is no hepatomegaly. There is no tenderness.   Musculoskeletal: Normal range of motion.        Right ankle: Normal. Achilles tendon normal.        Left ankle: Normal. " Achilles tendon normal.        Right lower leg: Normal.        Left lower leg: She exhibits tenderness. She exhibits no swelling.   Left Lower leg: Varicose veins are noted.  Homans sign is noted.  Left calf measures 29.5 cm in diameter.  Minimal swelling noted.  No erythema noted.    Right lower leg: Varicose veins are noted but not as bad as left.  Right calf measures 29.5 cm in diameter.  No swelling is noted.  Negative Homans sign.   Neurological: She is alert and oriented to person, place, and time.   Skin: Skin is warm, dry and intact. Capillary refill takes less than 2 seconds.   Psychiatric: She has a normal mood and affect. Her speech is normal and behavior is normal. Judgment and thought content normal. Cognition and memory are normal.         Results for orders placed or performed in visit on 12/31/18   POC Urinalysis Dipstick   Result Value Ref Range    Color Yellow Yellow, Straw, Dark Yellow, Malena    Clarity, UA Clear Clear    Glucose, UA Negative Negative, 1000 mg/dL (3+) mg/dL    Bilirubin Negative Negative    Ketones, UA Negative Negative    Specific Gravity  1.010 1.005 - 1.030    Blood, UA Trace (A) Negative    pH, Urine 7.0 5.0 - 8.0    Protein, POC Trace (A) Negative mg/dL    Urobilinogen, UA Normal Normal    Leukocytes Trace (A) Negative    Nitrite, UA Negative Negative     Assessment/Plan   Codie was seen today for edema and urinary tract infection.    Diagnoses and all orders for this visit:    Acute left ankle pain  -     Cancel: US Venous Doppler Lower Extremity Right (duplex)  -     US Venous Doppler Lower Extremity Left (duplex)    Pain and swelling of left lower leg  -     Cancel: US Venous Doppler Lower Extremity Right (duplex)  -     US Venous Doppler Lower Extremity Left (duplex)    Chronic UTI  -     POC Urinalysis Dipstick    Mixed hyperlipidemia  -     CBC & Differential  -     Comprehensive Metabolic Panel  -     Lipid Panel With LDL / HDL Ratio    Hematuria, unspecified type  -      Urine Culture - Urine, Urine, Clean Catch    Varicose veins of both lower extremities, unspecified whether complicated    1.  New acute left ankle pain with lower leg pain and swelling/varicose veins: I'm concerned about a DVT.  She will have [facility].  She'll be notified of test results when completed.  She was instructed to wear compression stockings when ambulating.  We'll consider referral to vascular surgeon.  2.  Chronic UTI: In office urinalysis showed trace leukocytes and red blood cells.  A urine culture will be sent to the laboratory for further evaluation.  We'll hold antibiotic for now.  She'll be notified of test results and any medication usage.  We'll consider referral to urology.  3.  Chronic and stable mixed hyperlipidemia: She is fasting will have a CBC, CMP and a lipid profile collected at office visit today.  She'll be notified of test results when completed.      Sri Leach PA-C    [facility]  [address] [place] [id number]  Dept: [phone]  Dept Fax: [phone]  Loc: [phone]  Loc Fax: [phone]

## 2019-01-02 LAB
BACTERIA UR CULT: NO GROWTH
BACTERIA UR CULT: NORMAL

## 2019-01-18 ENCOUNTER — OFFICE VISIT (OUTPATIENT)
Dept: FAMILY MEDICINE CLINIC | Facility: CLINIC | Age: 74
End: 2019-01-18

## 2019-01-18 VITALS
HEART RATE: 76 BPM | TEMPERATURE: 97.7 F | HEIGHT: 64 IN | RESPIRATION RATE: 16 BRPM | BODY MASS INDEX: 25.1 KG/M2 | SYSTOLIC BLOOD PRESSURE: 130 MMHG | DIASTOLIC BLOOD PRESSURE: 82 MMHG | OXYGEN SATURATION: 99 % | WEIGHT: 147 LBS

## 2019-01-18 DIAGNOSIS — I83.813 VARICOSE VEINS OF BOTH LOWER EXTREMITIES WITH PAIN: ICD-10-CM

## 2019-01-18 DIAGNOSIS — E78.2 MIXED HYPERLIPIDEMIA: Primary | ICD-10-CM

## 2019-01-18 PROCEDURE — 99213 OFFICE O/P EST LOW 20 MIN: CPT | Performed by: PHYSICIAN ASSISTANT

## 2019-01-18 NOTE — PROGRESS NOTES
Subjective   Codie Munson is a 73 y.o. female presents for   Chief Complaint   Patient presents with   • Hyperlipidemia     management       History of Present Illness     Codie is a 73-year-old female who presents for hyperlipidemia management.  She has lost 1 pound since December 31, 2018.  States her diet has been healthy.  She's been cutting out carbs and sugars.  She is physically active by walking.  Sleep has been normal.  Denied any chest pain, shortness of air, dizziness or vision changes.  Codie still has severe post pains in her legs.  States aches are very tender and sore especially at night.  She has been compliant wearing compression socks which has helped. She has seen [facility] in past for her varicose veins.  They recommended surgery but she was hesitant.  States her pain in her legs do affect her sleep at times.    The following portions of the patient's history were reviewed and updated as appropriate: allergies, current medications, past family history, past medical history, past social history, past surgical history and problem list.    Review of Systems   Constitutional: Negative.    HENT: Negative.    Eyes: Negative.    Respiratory: Negative.  Negative for cough, shortness of breath and wheezing.    Cardiovascular: Negative.  Negative for chest pain, palpitations and leg swelling.   Gastrointestinal: Negative.    Endocrine: Negative.    Genitourinary: Negative.    Musculoskeletal: Negative.         B/L lower leg pain with varicose veins    Skin: Negative.    Allergic/Immunologic: Negative.    Neurological: Negative.  Negative for dizziness, weakness, light-headedness, numbness and headaches.   Hematological: Negative.    Psychiatric/Behavioral: Negative.    All other systems reviewed and are negative.        Vitals:    01/18/19 1406   BP: 130/82   BP Location: Left arm   Patient Position: Sitting   Cuff Size: Adult   Pulse: 76   Resp: 16   Temp: 97.7 °F (36.5 °C)   TempSrc: Oral   SpO2:  "99%   Weight: 66.7 kg (147 lb)   Height: 162.6 cm (64\")     Wt Readings from Last 3 Encounters:   01/18/19 66.7 kg (147 lb)   12/31/18 67.1 kg (148 lb)   10/09/18 64.4 kg (142 lb)       BP Readings from Last 3 Encounters:   01/18/19 130/82   12/31/18 128/74   10/09/18 130/72     Social History     Socioeconomic History   • Marital status:      Spouse name: Not on file   • Number of children: Not on file   • Years of education: Not on file   • Highest education level: Not on file   Social Needs   • Financial resource strain: Not on file   • Food insecurity - worry: Not on file   • Food insecurity - inability: Not on file   • Transportation needs - medical: Not on file   • Transportation needs - non-medical: Not on file   Occupational History   • Not on file   Tobacco Use   • Smoking status: Never Smoker   • Smokeless tobacco: Never Used   Substance and Sexual Activity   • Alcohol use: No   • Drug use: Defer   • Sexual activity: Defer   Other Topics Concern   • Not on file   Social History Narrative   • Not on file       Allergies   Allergen Reactions   • Augmentin [Amoxicillin-Pot Clavulanate] Rash   • Biaxin [Clarithromycin] Rash       Body mass index is 25.23 kg/m².    Objective   Physical Exam   Constitutional: She is oriented to person, place, and time. Vital signs are normal. She appears well-developed and well-nourished.   Neck: Trachea normal and phonation normal. Neck supple. Carotid bruit is not present. No edema present.   Cardiovascular: Normal rate, regular rhythm, S1 normal, S2 normal, normal heart sounds and normal pulses.   Pulmonary/Chest: Effort normal and breath sounds normal.   Abdominal: Soft. Normal appearance, normal aorta and bowel sounds are normal. There is no hepatomegaly. There is no tenderness.   Neurological: She is alert and oriented to person, place, and time.   Skin: Skin is warm, dry and intact. Capillary refill takes less than 2 seconds.   B/L lower leg Varicose veins noted "   Psychiatric: She has a normal mood and affect. Her speech is normal and behavior is normal. Judgment and thought content normal. Cognition and memory are normal.       Assessment/Plan   Codie was seen today for hyperlipidemia.    Diagnoses and all orders for this visit:    Mixed hyperlipidemia    Varicose veins of both lower extremities with pain  -     Ambulatory Referral to Vascular Surgery      1.  Chronic and stable hyperlipidemia: I have reviewed Codie's lab work with her at office visit today.  She will continue to make healthy choices and to continue exercising.  Plan to repeat fasting blood work in 6 months.  2.  Chronic varicose veins of bilateral lower extremities with pain: I have placed a referral to her vascular surgeon, Dr. Perrin, for further evaluation.  Codie was instructed to wear compression stockings when ambulating or traveling.  She voiced understanding.  I have reviewed her Doppler ultrasound with her at office visit today which was normal.    Sri Leach PA-C    [address] [address]  Dept: [phone]  Dept Fax: [phone]  Loc: [phone]  Loc Fax: [phone]        No visits with results within 2 Week(s) from this visit.   Latest known visit with results is:   Office Visit on 12/31/2018   Component Date Value Ref Range Status   • Color 12/31/2018 Yellow  Yellow, Straw, Dark Yellow, Malena Final   • Clarity, UA 12/31/2018 Clear  Clear Final   • Glucose, UA 12/31/2018 Negative  Negative, 1000 mg/dL (3+) mg/dL Final   • Bilirubin 12/31/2018 Negative  Negative Final   • Ketones, UA 12/31/2018 Negative  Negative Final   • Specific Gravity  12/31/2018 1.010  1.005 - 1.030 Final   • Blood, UA 12/31/2018 Trace* Negative Final   • pH, Urine 12/31/2018 7.0  5.0 - 8.0 Final   • Protein, POC 12/31/2018 Trace* Negative mg/dL Final   • Urobilinogen, UA 12/31/2018 Normal  Normal Final   •  Leukocytes 12/31/2018 Trace* Negative Final   • Nitrite, UA 12/31/2018 Negative  Negative Final   • WBC 12/31/2018 5.25  4.80 - 10.80 10*3/mm3 Final   • RBC 12/31/2018 4.79  4.20 - 5.40 10*6/mm3 Final   • Hemoglobin 12/31/2018 13.5  12.0 - 16.0 g/dL Final   • Hematocrit 12/31/2018 43.0  37.0 - 47.0 % Final   • MCV 12/31/2018 89.8  81.0 - 99.0 fL Final   • MCH 12/31/2018 28.2  27.0 - 31.0 pg Final   • MCHC 12/31/2018 31.4  31.0 - 37.0 g/dL Final   • RDW 12/31/2018 13.2  11.5 - 14.5 % Final   • Platelets 12/31/2018 336  140 - 500 10*3/mm3 Final   • Neutrophil Rel % 12/31/2018 60.6  45.0 - 70.0 % Final   • Lymphocyte Rel % 12/31/2018 28.2  20.0 - 45.0 % Final   • Monocyte Rel % 12/31/2018 6.7  3.0 - 8.0 % Final   • Eosinophil Rel % 12/31/2018 3.2  0.0 - 4.0 % Final   • Basophil Rel % 12/31/2018 1.1  0.0 - 2.0 % Final   • Neutrophils Absolute 12/31/2018 3.18  1.50 - 8.30 10*3/mm3 Final   • Lymphocytes Absolute 12/31/2018 1.48  0.60 - 4.80 10*3/mm3 Final   • Monocytes Absolute 12/31/2018 0.35  0.00 - 1.00 10*3/mm3 Final   • Eosinophils Absolute 12/31/2018 0.17  0.10 - 0.30 10*3/mm3 Final   • Basophils Absolute 12/31/2018 0.06  0.00 - 0.20 10*3/mm3 Final   • Immature Granulocyte Rel % 12/31/2018 0.2  0.0 - 0.5 % Final   • Immature Grans Absolute 12/31/2018 0.01  0.00 - 0.03 10*3/mm3 Final   • nRBC 12/31/2018 0.0  0.0 - 0.0 /100 WBC Final   • Glucose 12/31/2018 84  65 - 99 mg/dL Final   • BUN 12/31/2018 10  8 - 23 mg/dL Final   • Creatinine 12/31/2018 0.91  0.57 - 1.00 mg/dL Final   • eGFR Non  Am 12/31/2018 60* >60 mL/min/1.73 Final    Comment: The MDRD GFR formula is only valid for adults with stable  renal function between ages 18 and 70.     • eGFR  Am 12/31/2018 73  >60 mL/min/1.73 Final   • BUN/Creatinine Ratio 12/31/2018 11.0  7.0 - 25.0 Final   • Sodium 12/31/2018 143  136 - 145 mmol/L Final   • Potassium 12/31/2018 4.5  3.5 - 5.2 mmol/L Final   • Chloride 12/31/2018 103  98 - 107 mmol/L Final   •  Total CO2 12/31/2018 30.7* 22.0 - 29.0 mmol/L Final   • Calcium 12/31/2018 9.0  8.8 - 10.5 mg/dL Final   • Total Protein 12/31/2018 6.9  6.0 - 8.5 g/dL Final   • Albumin 12/31/2018 4.30  3.50 - 5.20 g/dL Final   • Globulin 12/31/2018 2.6  gm/dL Final   • A/G Ratio 12/31/2018 1.7  g/dL Final   • Total Bilirubin 12/31/2018 0.4  0.2 - 1.2 mg/dL Final   • Alkaline Phosphatase 12/31/2018 101  40 - 129 U/L Final   • AST (SGOT) 12/31/2018 21  5 - 32 U/L Final   • ALT (SGPT) 12/31/2018 21  5 - 33 U/L Final   • Total Cholesterol 12/31/2018 163  0 - 200 mg/dL Final   • Triglycerides 12/31/2018 165* 0 - 150 mg/dL Final   • HDL Cholesterol 12/31/2018 52  40 - 60 mg/dL Final   • VLDL Cholesterol 12/31/2018 33* 7 - 27 mg/dL Final   • LDL Cholesterol  12/31/2018 78  0 - 100 mg/dL Final   • LDL/HDL Ratio 12/31/2018 1.50   Final   • Urine Culture 12/31/2018 Final report   Final   • Result 1 12/31/2018 No growth   Final

## 2019-05-30 ENCOUNTER — LAB (OUTPATIENT)
Dept: LAB | Facility: HOSPITAL | Age: 74
End: 2019-05-30

## 2019-05-30 ENCOUNTER — HOSPITAL ENCOUNTER (OUTPATIENT)
Dept: CARDIOLOGY | Facility: HOSPITAL | Age: 74
Discharge: HOME | End: 2019-05-30
Admitting: SURGERY

## 2019-05-30 ENCOUNTER — TRANSCRIBE ORDERS (OUTPATIENT)
Dept: ADMINISTRATIVE | Facility: HOSPITAL | Age: 74
End: 2019-05-30

## 2019-05-30 DIAGNOSIS — Z01.818 PRE-OP TESTING: ICD-10-CM

## 2019-05-30 DIAGNOSIS — Z01.818 PRE-OP TESTING: Primary | ICD-10-CM

## 2019-05-30 LAB
BASOPHILS # BLD AUTO: 0.03 10*3/MM3 (ref 0–0.2)
BASOPHILS NFR BLD AUTO: 0.6 % (ref 0–1.5)
DEPRECATED RDW RBC AUTO: 42 FL (ref 37–54)
EOSINOPHIL # BLD AUTO: 0.06 10*3/MM3 (ref 0–0.4)
EOSINOPHIL NFR BLD AUTO: 1.2 % (ref 0.3–6.2)
ERYTHROCYTE [DISTWIDTH] IN BLOOD BY AUTOMATED COUNT: 13.3 % (ref 12.3–15.4)
HCT VFR BLD AUTO: 36.4 % (ref 34–46.6)
HGB BLD-MCNC: 11.9 G/DL (ref 12–15.9)
IMM GRANULOCYTES # BLD AUTO: 0.02 10*3/MM3 (ref 0–0.05)
IMM GRANULOCYTES NFR BLD AUTO: 0.4 % (ref 0–0.5)
LYMPHOCYTES # BLD AUTO: 1.62 10*3/MM3 (ref 0.7–3.1)
LYMPHOCYTES NFR BLD AUTO: 31.2 % (ref 19.6–45.3)
MCH RBC QN AUTO: 28.3 PG (ref 26.6–33)
MCHC RBC AUTO-ENTMCNC: 32.7 G/DL (ref 31.5–35.7)
MCV RBC AUTO: 86.7 FL (ref 79–97)
MONOCYTES # BLD AUTO: 0.28 10*3/MM3 (ref 0.1–0.9)
MONOCYTES NFR BLD AUTO: 5.4 % (ref 5–12)
NEUTROPHILS # BLD AUTO: 3.19 10*3/MM3 (ref 1.7–7)
NEUTROPHILS NFR BLD AUTO: 61.2 % (ref 42.7–76)
NRBC BLD AUTO-RTO: 0 /100 WBC (ref 0–0.2)
PLAT MORPH BLD: NORMAL
PLATELET # BLD AUTO: 307 10*3/MM3 (ref 140–450)
PMV BLD AUTO: 9.1 FL (ref 6–12)
RBC # BLD AUTO: 4.2 10*6/MM3 (ref 3.77–5.28)
RBC MORPH BLD: NORMAL
WBC MORPH BLD: NORMAL
WBC NRBC COR # BLD: 5.2 10*3/MM3 (ref 3.4–10.8)

## 2019-05-30 PROCEDURE — 36415 COLL VENOUS BLD VENIPUNCTURE: CPT

## 2019-05-30 PROCEDURE — 85007 BL SMEAR W/DIFF WBC COUNT: CPT

## 2019-05-30 PROCEDURE — 85025 COMPLETE CBC W/AUTO DIFF WBC: CPT

## 2019-05-30 PROCEDURE — 93005 ELECTROCARDIOGRAM TRACING: CPT | Performed by: SURGERY

## 2019-05-30 PROCEDURE — 93010 ELECTROCARDIOGRAM REPORT: CPT | Performed by: INTERNAL MEDICINE

## 2019-06-19 ENCOUNTER — TRANSCRIBE ORDERS (OUTPATIENT)
Dept: ADMINISTRATIVE | Facility: HOSPITAL | Age: 74
End: 2019-06-19

## 2019-06-19 DIAGNOSIS — Z12.39 SCREENING BREAST EXAMINATION: Primary | ICD-10-CM

## 2019-06-25 ENCOUNTER — TELEPHONE (OUTPATIENT)
Dept: FAMILY MEDICINE CLINIC | Facility: CLINIC | Age: 74
End: 2019-06-25

## 2019-07-01 ENCOUNTER — OFFICE VISIT (OUTPATIENT)
Dept: FAMILY MEDICINE CLINIC | Facility: CLINIC | Age: 74
End: 2019-07-01

## 2019-07-01 VITALS
HEIGHT: 64 IN | BODY MASS INDEX: 24.92 KG/M2 | OXYGEN SATURATION: 97 % | RESPIRATION RATE: 16 BRPM | HEART RATE: 77 BPM | TEMPERATURE: 98.2 F | DIASTOLIC BLOOD PRESSURE: 80 MMHG | SYSTOLIC BLOOD PRESSURE: 132 MMHG | WEIGHT: 146 LBS

## 2019-07-01 DIAGNOSIS — K62.5 BRBPR (BRIGHT RED BLOOD PER RECTUM): Primary | ICD-10-CM

## 2019-07-01 LAB
DEVELOPER EXPIRATION DATE: NORMAL
DEVELOPER LOT NUMBER: NORMAL
EXPIRATION DATE: NORMAL
FECAL OCCULT BLOOD SCREEN, POC: NEGATIVE
Lab: NORMAL
NEGATIVE CONTROL: NEGATIVE
POSITIVE CONTROL: POSITIVE

## 2019-07-01 PROCEDURE — 99213 OFFICE O/P EST LOW 20 MIN: CPT | Performed by: PHYSICIAN ASSISTANT

## 2019-07-01 PROCEDURE — 82270 OCCULT BLOOD FECES: CPT | Performed by: PHYSICIAN ASSISTANT

## 2019-07-01 RX ORDER — ASPIRIN 81 MG/1
81 TABLET, CHEWABLE ORAL 3 TIMES WEEKLY
COMMUNITY
End: 2021-12-22

## 2019-07-01 NOTE — PROGRESS NOTES
"Subjective   Codie Munson is a 73 y.o. female presents for   Chief Complaint   Patient presents with   • Hematochezia     06/25       History of Present Illness     Codie is a 73-year-old female who had one episode of blood in stool last week. States that she had diarrhea last week and notice some bright blood in the toilet. She examined the diarrhea with gloves and is sure it was blood.  She had a Colonoscopy in the past with Dr. Maldonado which was normal.  She was told that she had diverticulosis.  States she has had diarrhea off and on but is not constant.  Her bowel movements are daily without dark black tarry stools. She has a history of hemorrhoids but no pain now.   Denied any abdominal pain,fevers,chills, nausea,or vomiting.  Appetite and sleep have been normal.    The following portions of the patient's history were reviewed and updated as appropriate: allergies, current medications, past family history, past medical history, past social history, past surgical history and problem list.    Review of Systems   Constitutional: Negative.    HENT: Negative.    Eyes: Negative.    Respiratory: Negative.    Cardiovascular: Negative.    Gastrointestinal: Negative.  Negative for abdominal pain, constipation, diarrhea, nausea, rectal pain and vomiting.        One episode of blood on stool   Endocrine: Negative.    Genitourinary: Negative.    Musculoskeletal: Negative.    Skin: Negative.    Allergic/Immunologic: Negative.    Neurological: Negative.    Hematological: Negative.    Psychiatric/Behavioral: Negative.    All other systems reviewed and are negative.        Vitals:    07/01/19 1044   BP: 132/80   Pulse: 77   Resp: 16   Temp: 98.2 °F (36.8 °C)   SpO2: 97%   Weight: 66.2 kg (146 lb)   Height: 162.6 cm (64\")     Wt Readings from Last 3 Encounters:   07/01/19 66.2 kg (146 lb)   01/18/19 66.7 kg (147 lb)   12/31/18 67.1 kg (148 lb)     BP Readings from Last 3 Encounters:   07/01/19 132/80   01/18/19 130/82 "   12/31/18 128/74     Social History     Socioeconomic History   • Marital status:      Spouse name: Not on file   • Number of children: Not on file   • Years of education: Not on file   • Highest education level: Not on file   Tobacco Use   • Smoking status: Never Smoker   • Smokeless tobacco: Never Used   Substance and Sexual Activity   • Alcohol use: No   • Drug use: Defer   • Sexual activity: Defer       Allergies   Allergen Reactions   • Augmentin [Amoxicillin-Pot Clavulanate] Rash   • Biaxin [Clarithromycin] Rash       Body mass index is 25.06 kg/m².    Objective   Physical Exam   Constitutional: She is oriented to person, place, and time. Vital signs are normal. She appears well-developed and well-nourished.   Neck: Trachea normal and phonation normal. Neck supple.   Cardiovascular: Normal rate, regular rhythm, S1 normal, S2 normal, normal heart sounds and normal pulses.   No murmur heard.  Pulmonary/Chest: Effort normal and breath sounds normal.   Abdominal: Soft. Normal appearance, normal aorta and bowel sounds are normal. There is no hepatomegaly. There is no tenderness.   Genitourinary: Rectum normal. Rectal exam shows no external hemorrhoid, no internal hemorrhoid, no fissure, no mass, no tenderness, anal tone normal and guaiac negative stool. Pelvic exam was performed with patient in the knee-chest position.   Genitourinary Comments: Exam was chaperoned by Lacey Brock, nurse practitioner student   Neurological: She is alert and oriented to person, place, and time.   Skin: Skin is warm, dry and intact. Capillary refill takes less than 2 seconds.   Psychiatric: She has a normal mood and affect. Her speech is normal and behavior is normal. Judgment and thought content normal. Cognition and memory are normal.         Results for orders placed or performed in visit on 07/01/19   POC Occult Blood Stool   Result Value Ref Range    Fecal Occult Blood Negative Negative    Lot Number [id number]      Expiration Date 07/31/2020     DEVELOPER LOT NUMBER [id number]     DEVELOPER EXPIRATION DATE 07/31/2020     Positive Control Positive Positive    Negative Control Negative Negative     Assessment/Plan   Codie was seen today for hematochezia.    Diagnoses and all orders for this visit:    BRBPR (bright red blood per rectum)  -     POC Occult Blood Stool    Mrs. Codie Munson was seen in office today with one episode of bright red blood per rectum.  In office Hemoccult was negative.  Physical exam was unremarkable.  I suspect this was a rare episode occurrence.  I have asked her to keep a diary of her bowel movements.  She will notify office if bleeding returns.  Will consider referral to her GI specialist, Dr. Chau, at the [facility].      MP [facility]  [address] [place] [id number]  Dept: [phone]  Dept Fax: [phone]  Loc: [phone]  Loc Fax: [phone]

## 2019-07-17 DIAGNOSIS — Z00.00 ANNUAL PHYSICAL EXAM: ICD-10-CM

## 2019-07-17 DIAGNOSIS — E78.2 MIXED HYPERLIPIDEMIA: Primary | ICD-10-CM

## 2019-07-18 LAB
ALBUMIN SERPL-MCNC: 4.7 G/DL (ref 3.5–5.2)
ALBUMIN/GLOB SERPL: 2.5 G/DL
ALP SERPL-CCNC: 121 U/L (ref 39–117)
ALT SERPL-CCNC: 19 U/L (ref 1–33)
AST SERPL-CCNC: 19 U/L (ref 1–32)
BASOPHILS # BLD AUTO: 0.06 10*3/MM3 (ref 0–0.2)
BASOPHILS NFR BLD AUTO: 1.1 % (ref 0–1.5)
BILIRUB SERPL-MCNC: 0.3 MG/DL (ref 0.2–1.2)
BUN SERPL-MCNC: 11 MG/DL (ref 8–23)
BUN/CREAT SERPL: 12.1 (ref 7–25)
CALCIUM SERPL-MCNC: 9.1 MG/DL (ref 8.6–10.5)
CHLORIDE SERPL-SCNC: 102 MMOL/L (ref 98–107)
CHOLEST SERPL-MCNC: 185 MG/DL (ref 0–200)
CHOLEST/HDLC SERPL: 3.78 {RATIO}
CO2 SERPL-SCNC: 30.3 MMOL/L (ref 22–29)
CREAT SERPL-MCNC: 0.91 MG/DL (ref 0.57–1)
EOSINOPHIL # BLD AUTO: 0.19 10*3/MM3 (ref 0–0.4)
EOSINOPHIL NFR BLD AUTO: 3.4 % (ref 0.3–6.2)
ERYTHROCYTE [DISTWIDTH] IN BLOOD BY AUTOMATED COUNT: 13.9 % (ref 12.3–15.4)
GLOBULIN SER CALC-MCNC: 1.9 GM/DL
GLUCOSE SERPL-MCNC: 88 MG/DL (ref 65–99)
HCT VFR BLD AUTO: 43.1 % (ref 34–46.6)
HDLC SERPL-MCNC: 49 MG/DL (ref 40–60)
HGB BLD-MCNC: 13.2 G/DL (ref 12–15.9)
IMM GRANULOCYTES # BLD AUTO: 0.02 10*3/MM3 (ref 0–0.05)
IMM GRANULOCYTES NFR BLD AUTO: 0.4 % (ref 0–0.5)
LDLC SERPL CALC-MCNC: 100 MG/DL (ref 0–100)
LYMPHOCYTES # BLD AUTO: 1.84 10*3/MM3 (ref 0.7–3.1)
LYMPHOCYTES NFR BLD AUTO: 32.5 % (ref 19.6–45.3)
MCH RBC QN AUTO: 27.7 PG (ref 26.6–33)
MCHC RBC AUTO-ENTMCNC: 30.6 G/DL (ref 31.5–35.7)
MCV RBC AUTO: 90.4 FL (ref 79–97)
MONOCYTES # BLD AUTO: 0.36 10*3/MM3 (ref 0.1–0.9)
MONOCYTES NFR BLD AUTO: 6.4 % (ref 5–12)
NEUTROPHILS # BLD AUTO: 3.19 10*3/MM3 (ref 1.7–7)
NEUTROPHILS NFR BLD AUTO: 56.2 % (ref 42.7–76)
NRBC BLD AUTO-RTO: 0 /100 WBC (ref 0–0.2)
PLATELET # BLD AUTO: 330 10*3/MM3 (ref 140–450)
POTASSIUM SERPL-SCNC: 4.7 MMOL/L (ref 3.5–5.2)
PROT SERPL-MCNC: 6.6 G/DL (ref 6–8.5)
RBC # BLD AUTO: 4.77 10*6/MM3 (ref 3.77–5.28)
SODIUM SERPL-SCNC: 143 MMOL/L (ref 136–145)
TRIGL SERPL-MCNC: 178 MG/DL (ref 0–150)
VLDLC SERPL CALC-MCNC: 35.6 MG/DL
WBC # BLD AUTO: 5.66 10*3/MM3 (ref 3.4–10.8)

## 2019-07-24 ENCOUNTER — OFFICE VISIT (OUTPATIENT)
Dept: FAMILY MEDICINE CLINIC | Facility: CLINIC | Age: 74
End: 2019-07-24

## 2019-07-24 VITALS
WEIGHT: 148 LBS | OXYGEN SATURATION: 99 % | BODY MASS INDEX: 25.27 KG/M2 | TEMPERATURE: 98 F | HEART RATE: 68 BPM | HEIGHT: 64 IN | RESPIRATION RATE: 17 BRPM | DIASTOLIC BLOOD PRESSURE: 78 MMHG | SYSTOLIC BLOOD PRESSURE: 130 MMHG

## 2019-07-24 DIAGNOSIS — J01.00 ACUTE MAXILLARY SINUSITIS, RECURRENCE NOT SPECIFIED: ICD-10-CM

## 2019-07-24 DIAGNOSIS — E78.2 MIXED HYPERLIPIDEMIA: Primary | ICD-10-CM

## 2019-07-24 PROCEDURE — 99214 OFFICE O/P EST MOD 30 MIN: CPT | Performed by: PHYSICIAN ASSISTANT

## 2019-07-24 RX ORDER — ROSUVASTATIN CALCIUM 20 MG/1
20 TABLET, COATED ORAL NIGHTLY
Qty: 90 TABLET | Refills: 3 | Status: SHIPPED | OUTPATIENT
Start: 2019-07-24 | End: 2020-07-13

## 2019-07-24 RX ORDER — DOXYCYCLINE HYCLATE 100 MG
100 TABLET ORAL 2 TIMES DAILY
Qty: 20 TABLET | Refills: 0 | Status: SHIPPED | OUTPATIENT
Start: 2019-07-24 | End: 2019-10-11

## 2019-07-24 NOTE — PROGRESS NOTES
Subjective   Codie Munson is a 73 y.o. female presents for   Chief Complaint   Patient presents with   • Hyperlipidemia     management   • Headache   • Ear Drainage   • Nasal Congestion       History of Present Illness     Codie is a 73-year-old female who presents for hyperlipidemia management.  She has lost 1 pound since January 18, 2019. Diet has been slightly healthy.  Sleep has been normal.  She retired in June 2019.  Denied any chest pain,shortness of air,wheezing,or swelling of ankles.  She sees Dr. Lane, allergist for her hypertension.  This is doing well with Crestor medication.  Denied any muscle aches.    Codie has been having headaches,head congestion,stuffy nose,post nasal drip,dry cough and sinus pressure/pain. for the past few days. Her  has similar symptoms.  She has taken OTC Claritin this morning.    The following portions of the patient's history were reviewed and updated as appropriate: allergies, current medications, past family history, past medical history, past social history, past surgical history and problem list.    Review of Systems   Constitutional: Negative.  Negative for chills and fever.   HENT: Positive for congestion, postnasal drip, sinus pressure and sinus pain. Negative for ear pain, rhinorrhea, sneezing and sore throat.    Eyes: Negative.    Respiratory: Positive for cough. Negative for shortness of breath and wheezing.    Cardiovascular: Negative.  Negative for chest pain, palpitations and leg swelling.   Gastrointestinal: Negative.  Negative for constipation, diarrhea, nausea and vomiting.   Endocrine: Negative.    Genitourinary: Negative.    Musculoskeletal: Negative.    Skin: Negative.    Allergic/Immunologic: Negative.    Neurological: Positive for headaches. Negative for dizziness.   Hematological: Negative.    Psychiatric/Behavioral: Negative.    All other systems reviewed and are negative.        Vitals:    07/24/19 0803 07/24/19 0818   BP: 138/94  "130/78   BP Location:  Left arm   Patient Position:  Sitting   Cuff Size:  Adult   Pulse: 68    Resp: 17    Temp: 98 °F (36.7 °C)    SpO2: 99%    Weight: 67.1 kg (148 lb)    Height: 162.6 cm (64\")      Wt Readings from Last 3 Encounters:   07/24/19 67.1 kg (148 lb)   07/01/19 66.2 kg (146 lb)   01/18/19 66.7 kg (147 lb)     BP Readings from Last 3 Encounters:   07/24/19 130/78   07/01/19 132/80   01/18/19 130/82     Social History     Socioeconomic History   • Marital status:      Spouse name: Not on file   • Number of children: Not on file   • Years of education: Not on file   • Highest education level: Not on file   Tobacco Use   • Smoking status: Never Smoker   • Smokeless tobacco: Never Used   Substance and Sexual Activity   • Alcohol use: No   • Drug use: Defer   • Sexual activity: Defer       Allergies   Allergen Reactions   • Augmentin [Amoxicillin-Pot Clavulanate] Rash   • Biaxin [Clarithromycin] Rash       Body mass index is 25.4 kg/m².    Objective   Physical Exam   Constitutional: She is oriented to person, place, and time. Vital signs are normal. She appears well-developed and well-nourished.   HENT:   Head: Normocephalic and atraumatic.   Right Ear: Hearing, external ear and ear canal normal. Tympanic membrane is bulging.   Left Ear: Hearing, external ear and ear canal normal. Tympanic membrane is bulging.   Nose: Mucosal edema present. Right sinus exhibits maxillary sinus tenderness. Right sinus exhibits no frontal sinus tenderness. Left sinus exhibits maxillary sinus tenderness. Left sinus exhibits no frontal sinus tenderness.   Mouth/Throat: Uvula is midline, oropharynx is clear and moist and mucous membranes are normal.   Eyes: Conjunctivae, EOM and lids are normal. Pupils are equal, round, and reactive to light.   Fundoscopic exam:       The right eye shows no hemorrhage and no papilledema.        The left eye shows no hemorrhage and no papilledema.   Neck: Trachea normal and phonation " normal. Neck supple. Carotid bruit is not present. No edema present. No thyroid mass and no thyromegaly present.   Cardiovascular: Normal rate, regular rhythm, S1 normal, S2 normal, normal heart sounds and normal pulses.   No murmur heard.  Pulmonary/Chest: Effort normal and breath sounds normal.   Abdominal: Soft. Normal appearance, normal aorta and bowel sounds are normal. There is no hepatomegaly. There is no tenderness.   Lymphadenopathy:     She has no cervical adenopathy.   Neurological: She is alert and oriented to person, place, and time.   Skin: Skin is warm, dry and intact. Capillary refill takes less than 2 seconds.   Psychiatric: She has a normal mood and affect. Her speech is normal and behavior is normal. Judgment and thought content normal. Cognition and memory are normal.       Assessment/Plan   Codie was seen today for hyperlipidemia, headache, ear drainage and nasal congestion.    Diagnoses and all orders for this visit:    Mixed hyperlipidemia  -     rosuvastatin (CRESTOR) 20 MG tablet; Take 1 tablet by mouth Every Night.    Acute maxillary sinusitis, recurrence not specified  -     doxycycline (VIBRAMYICN) 100 MG tablet; Take 1 tablet by mouth 2 (Two) Times a Day.      1.  Chronic hyperlipidemia: I have reviewed her lab work that was collected on July 17, 2019 with her at office visit today.  Fasting blood sugar was 88, cholesterol 185, triglycerides 178, HDL 49 and LDL was 100.  I have refilled her Crestor medication to pharmacy.  I have asked her to decrease her carbohydrate intake.  Plan to repeat fasting blood work with Medicare wellness exam in October-November 2019.  2.  New acute maxillary sinusitis.  I have prescribed doxycycline antibiotic to pharmacy.  She will continue her nasal spray and over-the-counter antihistamine.  Codie will return to office if symptoms do not improve.    MP [facility]  [address]  [place] [address]  Dept: [phone]  Dept Fax: [phone]  Loc: [phone]  Loc Fax: [phone]        Orders Only on 07/17/2019   Component Date Value Ref Range Status   • Glucose 07/17/2019 88  65 - 99 mg/dL Final   • BUN 07/17/2019 11  8 - 23 mg/dL Final   • Creatinine 07/17/2019 0.91  0.57 - 1.00 mg/dL Final   • eGFR Non African Am 07/17/2019 60* >60 mL/min/1.73 Final    Comment: The MDRD GFR formula is only valid for adults with stable  renal function between ages 18 and 70.     • eGFR  Am 07/17/2019 73  >60 mL/min/1.73 Final   • BUN/Creatinine Ratio 07/17/2019 12.1  7.0 - 25.0 Final   • Sodium 07/17/2019 143  136 - 145 mmol/L Final   • Potassium 07/17/2019 4.7  3.5 - 5.2 mmol/L Final   • Chloride 07/17/2019 102  98 - 107 mmol/L Final   • Total CO2 07/17/2019 30.3* 22.0 - 29.0 mmol/L Final   • Calcium 07/17/2019 9.1  8.6 - 10.5 mg/dL Final   • Total Protein 07/17/2019 6.6  6.0 - 8.5 g/dL Final   • Albumin 07/17/2019 4.70  3.50 - 5.20 g/dL Final   • Globulin 07/17/2019 1.9  gm/dL Final   • A/G Ratio 07/17/2019 2.5  g/dL Final   • Total Bilirubin 07/17/2019 0.3  0.2 - 1.2 mg/dL Final   • Alkaline Phosphatase 07/17/2019 121* 39 - 117 U/L Final   • AST (SGOT) 07/17/2019 19  1 - 32 U/L Final   • ALT (SGPT) 07/17/2019 19  1 - 33 U/L Final   • WBC 07/17/2019 5.66  3.40 - 10.80 10*3/mm3 Final   • RBC 07/17/2019 4.77  3.77 - 5.28 10*6/mm3 Final   • Hemoglobin 07/17/2019 13.2  12.0 - 15.9 g/dL Final   • Hematocrit 07/17/2019 43.1  34.0 - 46.6 % Final   • MCV 07/17/2019 90.4  79.0 - 97.0 fL Final   • MCH 07/17/2019 27.7  26.6 - 33.0 pg Final   • MCHC 07/17/2019 30.6* 31.5 - 35.7 g/dL Final   • RDW 07/17/2019 13.9  12.3 - 15.4 % Final   • Platelets 07/17/2019 330  140 - 450 10*3/mm3 Final   • Neutrophil Rel % 07/17/2019 56.2  42.7 - 76.0 % Final   • Lymphocyte Rel % 07/17/2019 32.5  19.6 - 45.3 % Final   • Monocyte Rel % 07/17/2019 6.4  5.0 - 12.0 % Final   • Eosinophil Rel % 07/17/2019  3.4  0.3 - 6.2 % Final   • Basophil Rel % 07/17/2019 1.1  0.0 - 1.5 % Final   • Neutrophils Absolute 07/17/2019 3.19  1.70 - 7.00 10*3/mm3 Final   • Lymphocytes Absolute 07/17/2019 1.84  0.70 - 3.10 10*3/mm3 Final   • Monocytes Absolute 07/17/2019 0.36  0.10 - 0.90 10*3/mm3 Final   • Eosinophils Absolute 07/17/2019 0.19  0.00 - 0.40 10*3/mm3 Final   • Basophils Absolute 07/17/2019 0.06  0.00 - 0.20 10*3/mm3 Final   • Immature Granulocyte Rel % 07/17/2019 0.4  0.0 - 0.5 % Final   • Immature Grans Absolute 07/17/2019 0.02  0.00 - 0.05 10*3/mm3 Final   • nRBC 07/17/2019 0.0  0.0 - 0.2 /100 WBC Final   • Total Cholesterol 07/17/2019 185  0 - 200 mg/dL Final   • Triglycerides 07/17/2019 178* 0 - 150 mg/dL Final   • HDL Cholesterol 07/17/2019 49  40 - 60 mg/dL Final   • VLDL Cholesterol 07/17/2019 35.6  mg/dL Final   • LDL Cholesterol  07/17/2019 100  0 - 100 mg/dL Final   • Chol/HDL Ratio 07/17/2019 3.78   Final

## 2019-07-30 ENCOUNTER — HOSPITAL ENCOUNTER (OUTPATIENT)
Dept: MAMMOGRAPHY | Facility: HOSPITAL | Age: 74
Discharge: HOME | End: 2019-07-30
Admitting: PHYSICIAN ASSISTANT

## 2019-07-30 DIAGNOSIS — Z12.39 SCREENING BREAST EXAMINATION: ICD-10-CM

## 2019-07-30 PROCEDURE — 77063 BREAST TOMOSYNTHESIS BI: CPT

## 2019-07-30 PROCEDURE — 77067 SCR MAMMO BI INCL CAD: CPT

## 2019-10-11 ENCOUNTER — OFFICE VISIT (OUTPATIENT)
Dept: CARDIOLOGY | Facility: CLINIC | Age: 74
End: 2019-10-11

## 2019-10-11 VITALS
WEIGHT: 151.4 LBS | HEART RATE: 74 BPM | BODY MASS INDEX: 22.42 KG/M2 | HEIGHT: 69 IN | DIASTOLIC BLOOD PRESSURE: 72 MMHG | SYSTOLIC BLOOD PRESSURE: 126 MMHG

## 2019-10-11 DIAGNOSIS — E78.2 MIXED HYPERLIPIDEMIA: Primary | ICD-10-CM

## 2019-10-11 PROCEDURE — 93000 ELECTROCARDIOGRAM COMPLETE: CPT | Performed by: INTERNAL MEDICINE

## 2019-10-11 PROCEDURE — 99213 OFFICE O/P EST LOW 20 MIN: CPT | Performed by: INTERNAL MEDICINE

## 2019-10-11 RX ORDER — PHENAZOPYRIDINE HYDROCHLORIDE 200 MG/1
200 TABLET, FILM COATED ORAL AS NEEDED
COMMUNITY
Start: 2019-09-18 | End: 2019-10-24

## 2019-10-11 NOTE — PROGRESS NOTES
Date of Office Visit: 10/11/2019  Encounter Provider: Anamika Lane MD  Place of Service: [facility] CARDIOLOGY  Patient Name: Codie Munson  :1945      Patient ID:  Codie Munson is a 74 y.o. female is here for  followup for dizziness, history of low blood pressure and hyperlipidemia.        History of Present Illness    I originally saw her for complaints of dizziness, palpitations, and shortness of air in  . at that time she had a stress study which was abnormal with subsequent cardiac  catheterization at [facility] which showed no significant coronary artery disease.   Her left ventricular systolic function was normal. She however had an echocardiogram done  at the same time which showed an ejection fraction of 40%. Because of her lightheadedness  and dizziness, she had carotid duplex studies which showed minimal disease of the left  carotid artery. She then had repeat carotid duplex study performed on 2010  which showed no carotid artery disease.      Her last echocardiogram done in 2009 showed an ejection fraction of 54%, redundant  intra-atrial septum, a normal saline contrast study, grade I diastolic dysfunction, and no  significant valve abnormalities.      She had a bladder resuspension with Dr. Harish Arellano.      She was lightheaded and we decreased her losartan to 25 mg daily and she improved but then she worsened and so it was discontinued. She said the lightheadedness went away completely when she started wearing compression stockings which are knee high. She went to see Dr. Elliott 2014, and he  did not think there was anything he could do with her left leg but she has continued  aching in that leg and she is wearing compression stockings daily.  she had  evidence of reflux of the lesser and greater saphenous veins. They recommended  compression stockings because she was not very symptomatic.      Her echocardiogram which was  done 2016, and this showed ejection fraction of 60% with grade I diastolic dysfunction, normal segmental wall motion and no significant valve disease.         She had a normal stress nuclear study and vascular screening done 2017.      She had laboratory values on 2019 showing normal CMP, HDL 49, , normal CBC.    She sees Dr. Perrin for venous incompetence now and had vein stripping on the left.  She is wearing compression stockings her leg feels better.  She has short windedness when she goes up steps but [place] and she does not have this.  She has no chest pain or pressure.  She does not feel her heart racing or skipping.  She had no dizziness or syncope.  She has no chest heaviness.  She is now on aspirin 3 times a week.  She is taking her medications without difficulty.  Carotids today with Dr. Perrin did show some mild atherosclerosis without stenosis.    Past Medical History:   Diagnosis Date   • Cardiomyopathy (CMS/HCC)    • Chronic cystitis    • Cough    • Depression    • GERD (gastroesophageal reflux disease)    • Hyperlipidemia    • Hypertension    • Scarlet fever    • Strain of thoracic region    • Urethral prolapse    • URI (upper respiratory infection)    • Urinary retention          Past Surgical History:   Procedure Laterality Date   • APPENDECTOMY     • BLADDER SURGERY     • CARDIAC CATHETERIZATION      Normal. Performed at [facility].   •  SECTION         Current Outpatient Medications on File Prior to Visit   Medication Sig Dispense Refill   • aspirin 81 MG chewable tablet Chew 81 mg 3 (Three) Times a Week.     • Cholecalciferol (VITAMIN D3) 2000 units tablet Take  by mouth.     • conjugated estrogens (PREMARIN) 0.625 MG/GM vaginal cream USE A PEA-SIZED AMOUNT ON FINGERTIP AND PLACE IN THE VAGINAL OPENING 3 X PER WEEK.     • esomeprazole (NexIUM) 40 MG capsule Take 40 mg by mouth every morning before breakfast.     • fluticasone  (FLONASE) 50 MCG/ACT nasal spray 2 sprays into the nostril(s) as directed by provider As Needed.     • magnesium oxide (MAG-OX) 400 MG tablet Take 250 mg by mouth Every Other Day.     • phenazopyridine (PYRIDIUM) 200 MG tablet Take 200 mg by mouth As Needed.     • rosuvastatin (CRESTOR) 20 MG tablet Take 1 tablet by mouth Every Night. 90 tablet 3   • [DISCONTINUED] doxycycline (VIBRAMYICN) 100 MG tablet Take 1 tablet by mouth 2 (Two) Times a Day. 20 tablet 0     No current facility-administered medications on file prior to visit.        Social History     Socioeconomic History   • Marital status:      Spouse name: Not on file   • Number of children: Not on file   • Years of education: Not on file   • Highest education level: Not on file   Tobacco Use   • Smoking status: Never Smoker   • Smokeless tobacco: Never Used   Substance and Sexual Activity   • Alcohol use: No   • Drug use: Defer   • Sexual activity: Defer           Review of Systems   Constitution: Negative.   HENT: Negative for congestion.    Eyes: Negative for vision loss in left eye and vision loss in right eye.   Respiratory: Negative.  Negative for cough, hemoptysis, shortness of breath, sleep disturbances due to breathing, snoring, sputum production and wheezing.    Endocrine: Negative.    Hematologic/Lymphatic: Negative.    Skin: Negative for poor wound healing and rash.   Musculoskeletal: Negative for falls, gout, muscle cramps and myalgias.   Gastrointestinal: Negative for abdominal pain, diarrhea, dysphagia, hematemesis, melena, nausea and vomiting.   Neurological: Negative for excessive daytime sleepiness, dizziness, headaches, light-headedness, loss of balance, seizures and vertigo.   Psychiatric/Behavioral: Negative for depression and substance abuse. The patient is not nervous/anxious.        Procedures    ECG 12 Lead  Date/Time: 10/11/2019 9:43 AM  Performed by: Anamika Lane MD  Authorized by: Anamika Lane MD  "  Comparison: compared with previous ECG   Similar to previous ECG  Rhythm: sinus rhythm  T flattening: V3, V4, V5 and V6    Clinical impression: non-specific ECG                Objective:      Vitals:    10/11/19 0919   BP: 126/72   BP Location: Right arm   Patient Position: Sitting   Pulse: 74   Weight: 68.7 kg (151 lb 6.4 oz)   Height: 175.3 cm (69\")     Body mass index is 22.36 kg/m².    Physical Exam   Constitutional: She is oriented to person, place, and time. She appears well-developed and well-nourished. No distress.   HENT:   Head: Normocephalic and atraumatic.   Eyes: Conjunctivae are normal. No scleral icterus.   Neck: Neck supple. No JVD present. Carotid bruit is not present. No thyromegaly present.   Cardiovascular: Normal rate, regular rhythm, S1 normal, S2 normal, normal heart sounds and intact distal pulses.  No extrasystoles are present. PMI is not displaced. Exam reveals no gallop.   No murmur heard.  Pulses:       Carotid pulses are 2+ on the right side, and 2+ on the left side.       Radial pulses are 2+ on the right side, and 2+ on the left side.        Dorsalis pedis pulses are 2+ on the right side, and 2+ on the left side.        Posterior tibial pulses are 2+ on the right side, and 2+ on the left side.   Pulmonary/Chest: Effort normal and breath sounds normal. No respiratory distress. She has no wheezes. She has no rhonchi. She has no rales. She exhibits no tenderness.   Abdominal: Soft. Bowel sounds are normal. She exhibits no distension, no abdominal bruit and no mass. There is no tenderness.   Musculoskeletal: She exhibits no edema or deformity.   Lymphadenopathy:     She has no cervical adenopathy.   Neurological: She is alert and oriented to person, place, and time. No cranial nerve deficit.   Skin: Skin is warm and dry. No rash noted. She is not diaphoretic. No cyanosis. No pallor. Nails show no clubbing.   Psychiatric: She has a normal mood and affect. Judgment normal.   Vitals " reviewed.      Lab Review:       Assessment:      Diagnosis Plan   1. Mixed hyperlipidemia       1. Hyperlipidemia, on Crestor and fish oil. Well controlled.   2. Hypertension. No meds, just watch for now.   3. Normal renal arteriogram in 2002.   4. Normal cardiac catheterization in 2005.   5. Venous varicosities, s/p stripping on left 6/2019. Sees Dr. Perrin.  6. Numbness of her third toe on her right foot. Stable.   7. Recent bladder resuspension with Dr. Harish Arellano.  8. Allergies, getting allergy shots, sees Dr. Landeros.     Plan:       F/u in 1 year, no changes.  Doing well.

## 2019-10-16 DIAGNOSIS — E78.2 MIXED HYPERLIPIDEMIA: Primary | ICD-10-CM

## 2019-10-16 DIAGNOSIS — R30.0 DYSURIA: Primary | ICD-10-CM

## 2019-10-16 DIAGNOSIS — R31.29 HEMATURIA, MICROSCOPIC: ICD-10-CM

## 2019-10-16 DIAGNOSIS — Z00.00 MEDICARE ANNUAL WELLNESS VISIT, SUBSEQUENT: ICD-10-CM

## 2019-10-16 LAB
BILIRUB BLD-MCNC: ABNORMAL MG/DL
CLARITY, POC: CLEAR
COLOR UR: ABNORMAL
GLUCOSE UR STRIP-MCNC: NEGATIVE MG/DL
KETONES UR QL: NEGATIVE
LEUKOCYTE EST, POC: NEGATIVE
NITRITE UR-MCNC: NEGATIVE MG/ML
PH UR: 7 [PH] (ref 5–8)
PROT UR STRIP-MCNC: NEGATIVE MG/DL
RBC # UR STRIP: ABNORMAL /UL
SP GR UR: 1.01 (ref 1–1.03)
UROBILINOGEN UR QL: NORMAL

## 2019-10-16 PROCEDURE — 81002 URINALYSIS NONAUTO W/O SCOPE: CPT | Performed by: PHYSICIAN ASSISTANT

## 2019-10-17 LAB
ALBUMIN SERPL-MCNC: 4.5 G/DL (ref 3.5–5.2)
ALBUMIN/GLOB SERPL: 2 G/DL
ALP SERPL-CCNC: 108 U/L (ref 39–117)
ALT SERPL-CCNC: 16 U/L (ref 1–33)
AST SERPL-CCNC: 17 U/L (ref 1–32)
BASOPHILS # BLD AUTO: 0.05 10*3/MM3 (ref 0–0.2)
BASOPHILS NFR BLD AUTO: 0.8 % (ref 0–1.5)
BILIRUB SERPL-MCNC: 0.4 MG/DL (ref 0.2–1.2)
BUN SERPL-MCNC: 13 MG/DL (ref 8–23)
BUN/CREAT SERPL: 15.3 (ref 7–25)
CALCIUM SERPL-MCNC: 8.9 MG/DL (ref 8.6–10.5)
CHLORIDE SERPL-SCNC: 102 MMOL/L (ref 98–107)
CHOLEST SERPL-MCNC: 161 MG/DL (ref 0–200)
CHOLEST/HDLC SERPL: 3.43 {RATIO}
CO2 SERPL-SCNC: 28.7 MMOL/L (ref 22–29)
CREAT SERPL-MCNC: 0.85 MG/DL (ref 0.57–1)
EOSINOPHIL # BLD AUTO: 0.21 10*3/MM3 (ref 0–0.4)
EOSINOPHIL NFR BLD AUTO: 3.4 % (ref 0.3–6.2)
ERYTHROCYTE [DISTWIDTH] IN BLOOD BY AUTOMATED COUNT: 13.3 % (ref 12.3–15.4)
GLOBULIN SER CALC-MCNC: 2.3 GM/DL
GLUCOSE SERPL-MCNC: 86 MG/DL (ref 65–99)
HCT VFR BLD AUTO: 39.3 % (ref 34–46.6)
HDLC SERPL-MCNC: 47 MG/DL (ref 40–60)
HGB BLD-MCNC: 12.8 G/DL (ref 12–15.9)
IMM GRANULOCYTES # BLD AUTO: 0.03 10*3/MM3 (ref 0–0.05)
IMM GRANULOCYTES NFR BLD AUTO: 0.5 % (ref 0–0.5)
LDLC SERPL CALC-MCNC: 88 MG/DL (ref 0–100)
LYMPHOCYTES # BLD AUTO: 1.8 10*3/MM3 (ref 0.7–3.1)
LYMPHOCYTES NFR BLD AUTO: 29.5 % (ref 19.6–45.3)
MCH RBC QN AUTO: 27.9 PG (ref 26.6–33)
MCHC RBC AUTO-ENTMCNC: 32.6 G/DL (ref 31.5–35.7)
MCV RBC AUTO: 85.8 FL (ref 79–97)
MONOCYTES # BLD AUTO: 0.42 10*3/MM3 (ref 0.1–0.9)
MONOCYTES NFR BLD AUTO: 6.9 % (ref 5–12)
NEUTROPHILS # BLD AUTO: 3.6 10*3/MM3 (ref 1.7–7)
NEUTROPHILS NFR BLD AUTO: 58.9 % (ref 42.7–76)
NRBC BLD AUTO-RTO: 0 /100 WBC (ref 0–0.2)
PLATELET # BLD AUTO: 338 10*3/MM3 (ref 140–450)
POTASSIUM SERPL-SCNC: 4.7 MMOL/L (ref 3.5–5.2)
PROT SERPL-MCNC: 6.8 G/DL (ref 6–8.5)
RBC # BLD AUTO: 4.58 10*6/MM3 (ref 3.77–5.28)
SODIUM SERPL-SCNC: 141 MMOL/L (ref 136–145)
TRIGL SERPL-MCNC: 132 MG/DL (ref 0–150)
VLDLC SERPL CALC-MCNC: 26.4 MG/DL
WBC # BLD AUTO: 6.11 10*3/MM3 (ref 3.4–10.8)

## 2019-10-18 LAB
BACTERIA UR CULT: NORMAL
BACTERIA UR CULT: NORMAL

## 2019-10-24 ENCOUNTER — OFFICE VISIT (OUTPATIENT)
Dept: FAMILY MEDICINE CLINIC | Facility: CLINIC | Age: 74
End: 2019-10-24

## 2019-10-24 VITALS
WEIGHT: 151 LBS | BODY MASS INDEX: 22.36 KG/M2 | HEART RATE: 90 BPM | TEMPERATURE: 98.2 F | SYSTOLIC BLOOD PRESSURE: 122 MMHG | HEIGHT: 69 IN | OXYGEN SATURATION: 98 % | DIASTOLIC BLOOD PRESSURE: 78 MMHG | RESPIRATION RATE: 16 BRPM

## 2019-10-24 DIAGNOSIS — I42.8 OTHER CARDIOMYOPATHY (HCC): ICD-10-CM

## 2019-10-24 DIAGNOSIS — E78.2 MIXED HYPERLIPIDEMIA: ICD-10-CM

## 2019-10-24 DIAGNOSIS — Z78.0 POSTMENOPAUSAL: ICD-10-CM

## 2019-10-24 DIAGNOSIS — Z00.00 MEDICARE ANNUAL WELLNESS VISIT, SUBSEQUENT: Primary | ICD-10-CM

## 2019-10-24 PROCEDURE — G0439 PPPS, SUBSEQ VISIT: HCPCS | Performed by: PHYSICIAN ASSISTANT

## 2019-10-24 NOTE — PROGRESS NOTES
The ABCs of the Annual Wellness Visit  Subsequent Medicare Wellness Visit    No chief complaint on file.      Subjective   History of Present Illness:  Codie Munson is a 74 y.o. female who presents for a Subsequent Medicare Wellness Visit.  States she is feeling well at office visit today.  Currently sees Dr. Lane for her cardiomyopathy.  She has had appointments with Dr. Black for her chronic UTI's and vaginal issues.  Currently taking Premarin as directed.  She has seen Dr. Sayda Perrin for her varicose veins.  She was instructed to take aspirin 3 times a week.  Last DEXA scan has been more than 3 years ago.  Her mammogram is current and was normal on July 30, 2019.  Her diet has been very healthy.  She has been trying to cut back on carbohydrates, sweets and sodium.  Bowel movements have been daily without dark black tarry stool.  Her colonoscopy is current.  Denied any chest pain, shortness of air, dizziness, vision changes or swelling of ankles.    HEALTH RISK ASSESSMENT    Recent Hospitalizations:  No hospitalization(s) within the last year.    Current Medical Providers:  Patient Care Team:  Sri Leach PA-C as PCP - General (Family Medicine)    Smoking Status:  Social History     Tobacco Use   Smoking Status Never Smoker   Smokeless Tobacco Never Used       Alcohol Consumption:  Social History     Substance and Sexual Activity   Alcohol Use No       Depression Screen:   PHQ-2/PHQ-9 Depression Screening 10/24/2019   Little interest or pleasure in doing things 0   Feeling down, depressed, or hopeless 0   Total Score 0       Fall Risk Screen:  STEADI Fall Risk Assessment was completed, and patient is at LOW risk for falls.Assessment completed on:10/24/2019    Health Habits and Functional and Cognitive Screening:  No flowsheet data found.      Does the patient have evidence of cognitive impairment? No    Asprin use counseling:Taking ASA appropriately as indicated    Age-appropriate Screening  Schedule:  Refer to the list below for future screening recommendations based on patient's age, sex and/or medical conditions. Orders for these recommended tests are listed in the plan section. The patient has been provided with a written plan.    Health Maintenance   Topic Date Due   • TDAP/TD VACCINES (1 - Tdap) 1962   • PNEUMOCOCCAL VACCINES (65+ LOW/MEDIUM RISK) (1 of 2 - PCV13) 2008   • LIPID PANEL  10/16/2020   • INFLUENZA VACCINE  Completed   • MAMMOGRAM  Discontinued   • COLONOSCOPY  Discontinued   • ZOSTER VACCINE  Discontinued          The following portions of the patient's history were reviewed and updated as appropriate:   She  has a past medical history of Cardiomyopathy (CMS/HCC), Chronic cystitis, Cough, Depression, GERD (gastroesophageal reflux disease), Hyperlipidemia, Hypertension, Scarlet fever, Strain of thoracic region, Urethral prolapse, URI (upper respiratory infection), and Urinary retention.  She does not have any pertinent problems on file.  She  has a past surgical history that includes Appendectomy;  section; Bladder surgery; and Cardiac catheterization.  Her family history includes Aneurysm in her father; Cancer in her brother and mother; Crohn's disease in her sister; Heart disease in her father; Heart failure in her mother; Kidney failure in her mother; Prostate cancer in her brother; Stroke in her father.  She  reports that she has never smoked. She has never used smokeless tobacco. Drug use questions deferred to the physician. She reports that she does not drink alcohol.  Current Outpatient Medications   Medication Sig Dispense Refill   • aspirin 81 MG chewable tablet Chew 81 mg 3 (Three) Times a Week.     • Cholecalciferol (VITAMIN D3) 2000 units tablet Take  by mouth.     • conjugated estrogens (PREMARIN) 0.625 MG/GM vaginal cream USE A PEA-SIZED AMOUNT ON FINGERTIP AND PLACE IN THE VAGINAL OPENING 3 X PER WEEK.     • esomeprazole (NexIUM) 40 MG capsule Take 40  mg by mouth every morning before breakfast.     • fluticasone (FLONASE) 50 MCG/ACT nasal spray 2 sprays into the nostril(s) as directed by provider As Needed.     • magnesium oxide (MAG-OX) 400 MG tablet Take 250 mg by mouth Every Other Day.     • rosuvastatin (CRESTOR) 20 MG tablet Take 1 tablet by mouth Every Night. 90 tablet 3     No current facility-administered medications for this visit.      Current Outpatient Medications on File Prior to Visit   Medication Sig   • aspirin 81 MG chewable tablet Chew 81 mg 3 (Three) Times a Week.   • Cholecalciferol (VITAMIN D3) 2000 units tablet Take  by mouth.   • conjugated estrogens (PREMARIN) 0.625 MG/GM vaginal cream USE A PEA-SIZED AMOUNT ON FINGERTIP AND PLACE IN THE VAGINAL OPENING 3 X PER WEEK.   • esomeprazole (NexIUM) 40 MG capsule Take 40 mg by mouth every morning before breakfast.   • fluticasone (FLONASE) 50 MCG/ACT nasal spray 2 sprays into the nostril(s) as directed by provider As Needed.   • magnesium oxide (MAG-OX) 400 MG tablet Take 250 mg by mouth Every Other Day.   • rosuvastatin (CRESTOR) 20 MG tablet Take 1 tablet by mouth Every Night.   • [DISCONTINUED] phenazopyridine (PYRIDIUM) 200 MG tablet Take 200 mg by mouth As Needed.     No current facility-administered medications on file prior to visit.      She is allergic to augmentin [amoxicillin-pot clavulanate] and biaxin [clarithromycin]..    Outpatient Medications Prior to Visit   Medication Sig Dispense Refill   • aspirin 81 MG chewable tablet Chew 81 mg 3 (Three) Times a Week.     • Cholecalciferol (VITAMIN D3) 2000 units tablet Take  by mouth.     • conjugated estrogens (PREMARIN) 0.625 MG/GM vaginal cream USE A PEA-SIZED AMOUNT ON FINGERTIP AND PLACE IN THE VAGINAL OPENING 3 X PER WEEK.     • esomeprazole (NexIUM) 40 MG capsule Take 40 mg by mouth every morning before breakfast.     • fluticasone (FLONASE) 50 MCG/ACT nasal spray 2 sprays into the nostril(s) as directed by provider As Needed.     •  magnesium oxide (MAG-OX) 400 MG tablet Take 250 mg by mouth Every Other Day.     • rosuvastatin (CRESTOR) 20 MG tablet Take 1 tablet by mouth Every Night. 90 tablet 3   • phenazopyridine (PYRIDIUM) 200 MG tablet Take 200 mg by mouth As Needed.       No facility-administered medications prior to visit.        Patient Active Problem List   Diagnosis   • Myocardiopathy (CMS/HCC)   • Bladder infection, chronic   • Blues   • Gastro-esophageal reflux disease without esophagitis   • HLD (hyperlipidemia)   • Prolapse of urethra   • Incomplete bladder emptying   • Gastroesophageal reflux disease with hiatal hernia   • Erosive gastritis   • Diverticulosis of large intestine without hemorrhage   • Chronic UTI   • Foot sprain   • Medicare annual wellness visit, subsequent   • Screening mammogram, encounter for   • Lower abdominal pain   • Hematuria   • Left knee pain   • Chondromalacia of patellofemoral joint, left   • Acute left ankle pain   • Varicose veins of both lower extremities   • Pain and swelling of left lower leg   • Acute maxillary sinusitis   • Postmenopausal       Advanced Care Planning:  Patient does not have an advance directive - not interested in additional information    Review of Systems   Constitutional: Negative.    HENT: Negative.    Eyes: Negative.    Respiratory: Negative.  Negative for cough, chest tightness, shortness of breath and wheezing.    Cardiovascular: Negative.  Negative for chest pain, palpitations and leg swelling.   Gastrointestinal: Negative.  Negative for abdominal pain, blood in stool, constipation, diarrhea, nausea, rectal pain and vomiting.   Endocrine: Negative.    Genitourinary: Negative.    Musculoskeletal: Negative.    Skin: Negative.    Allergic/Immunologic: Negative.    Neurological: Negative.  Negative for dizziness, light-headedness and headaches.   Hematological: Negative.    Psychiatric/Behavioral: Negative.  Negative for sleep disturbance.   All other systems reviewed and  "are negative.      Compared to one year ago, the patient feels her physical health is the same.  Compared to one year ago, the patient feels her mental health is the same.    Reviewed chart for potential of high risk medication in the elderly: yes  Reviewed chart for potential of harmful drug interactions in the elderly:yes    Objective         Vitals:    10/24/19 0806 10/24/19 0830   BP: 148/88 122/78   BP Location:  Left arm   Patient Position:  Sitting   Cuff Size:  Adult   Pulse: 90    Resp: 16    Temp: 98.2 °F (36.8 °C)    SpO2: 98%    Weight: 68.5 kg (151 lb)    Height: 175.3 cm (69\")        Body mass index is 22.3 kg/m².  Discussed the patient's BMI with her. The BMI is in the acceptable range.    Physical Exam   Constitutional: She is oriented to person, place, and time. Vital signs are normal. She appears well-developed and well-nourished.   Neck: Trachea normal and phonation normal. Neck supple. Normal carotid pulses, no hepatojugular reflux and no JVD present. No tracheal tenderness present. Carotid bruit is not present. No tracheal deviation and no edema present. No thyroid mass and no thyromegaly present.   Cardiovascular: Normal rate, regular rhythm, S1 normal, S2 normal, normal heart sounds and normal pulses.   No murmur heard.  Pulmonary/Chest: Effort normal and breath sounds normal.   Abdominal: Soft. Normal appearance, normal aorta and bowel sounds are normal. There is no hepatomegaly. There is no tenderness.   Neurological: She is alert and oriented to person, place, and time.   Skin: Skin is warm, dry and intact. Capillary refill takes less than 2 seconds.   Psychiatric: She has a normal mood and affect. Her speech is normal and behavior is normal. Judgment and thought content normal. Cognition and memory are normal.       Lab Results   Component Value Date    GLU 86 10/16/2019    CHLPL 161 10/16/2019    TRIG 132 10/16/2019    HDL 47 10/16/2019    LDL 88 10/16/2019    VLDL 26.4 10/16/2019    "     Assessment/Plan   Medicare Risks and Personalized Health Plan  CMS Preventative Services Quick Reference  Dexascan    The above risks/problems have been discussed with the patient.  Pertinent information has been shared with the patient in the After Visit Summary.  Follow up plans and orders are seen below in the Assessment/Plan Section.    Diagnoses and all orders for this visit:    1. Medicare annual wellness visit, subsequent (Primary)    2. Mixed hyperlipidemia    3. Postmenopausal  -     DEXA Bone Density Axial; Future    4. Other cardiomyopathy (CMS/[facility])    1.  Annual Medicare wellness examination with hyperlipidemia: I have reviewed her lab work from October 16, 2019 with her.  Cholesterol 161, triglycerides 132, HDL 47 and LDL was 88.  Her fasting blood sugar was normal at 86.  She was given encouragement and praise for her good readings.  She will continue her current medications at home as directed.  Would like her to continue to eat healthy and exercise regularly.  Return to office in 6 months for reevaluation and fasting blood work.  States she has had all of her immunizations by Dr. Coughlin here in office.  She has also had immunizations at her local pharmacy.  We will try to obtain these from her pharmacy as well as old medical records.  2.  Chronic postmenopausal female: She has not had a DEXA scan in more than 3 years.  She will have a DEXA scan at [facility].  Codie will be notified of test results when completed.  3.  Chronic cardiomyopathy: Sees Dr. Lane, cardiologist for this.  She has upcoming appointment in the next few months.    Patient Counseling:  --Nutrition: Stressed importance of moderation in sodium/caffeine intake, saturated fat and cholesterol.  Discussed caloric balance, sufficient intake of fresh fruits, vegetables, fiber, calcium, iron.  --Discussed the new recommendation against daily use of baby aspirin for primary prevention in low risk patients.  --Exercise:  Stressed the importance of regular exercise.   --Substance Abuse: Discussed cessation/primary prevention of tobacco, alcohol, or other drug use; driving or other dangerous activities under the influence.    --Dental health: Discussed importance of regular tooth brushing, flossing, and    dental visits.  -- suggested having eyes and vision checked if needed or past due.  --Immunizations reviewed. Will obtain updated immunization from her [facility] pharmacy.  .      Follow Up:  Return in about 6 months (around 4/24/2020).     An After Visit Summary and PPPS were given to the patient.      Orders Only on 10/16/2019   Component Date Value Ref Range Status   • Urine Culture 10/16/2019 Final report   Final   • Result 1 10/16/2019 Comment   Final    Comment: Culture shows less than 10,000 colony forming units of bacteria per  milliliter of urine. This colony count is not generally considered  to be clinically significant.     Orders Only on 10/16/2019   Component Date Value Ref Range Status   • Color 10/16/2019 Orange* Yellow, Straw, Dark Yellow, Malena Final   • Clarity, UA 10/16/2019 Clear  Clear Final   • Glucose, UA 10/16/2019 Negative  Negative, 1000 mg/dL (3+) mg/dL Final   • Bilirubin 10/16/2019 Small (1+)* Negative Final   • Ketones, UA 10/16/2019 Negative  Negative Final   • Specific Gravity  10/16/2019 1.010  1.005 - 1.030 Final   • Blood, UA 10/16/2019 Small* Negative Final   • pH, Urine 10/16/2019 7.0  5.0 - 8.0 Final   • Protein, POC 10/16/2019 Negative  Negative mg/dL Final   • Urobilinogen, UA 10/16/2019 Normal  Normal Final   • Leukocytes 10/16/2019 Negative  Negative Final   • Nitrite, UA 10/16/2019 Negative  Negative Final   Orders Only on 10/16/2019   Component Date Value Ref Range Status   • Glucose 10/16/2019 86  65 - 99 mg/dL Final   • BUN 10/16/2019 13  8 - 23 mg/dL Final   • Creatinine 10/16/2019 0.85  0.57 - 1.00 mg/dL Final   • eGFR Non  Am 10/16/2019 65  >60 mL/min/1.73 Final    Comment: The  MDRD GFR formula is only valid for adults with stable  renal function between ages 18 and 70.     • eGFR  Am 10/16/2019 79  >60 mL/min/1.73 Final   • BUN/Creatinine Ratio 10/16/2019 15.3  7.0 - 25.0 Final   • Sodium 10/16/2019 141  136 - 145 mmol/L Final   • Potassium 10/16/2019 4.7  3.5 - 5.2 mmol/L Final   • Chloride 10/16/2019 102  98 - 107 mmol/L Final   • Total CO2 10/16/2019 28.7  22.0 - 29.0 mmol/L Final   • Calcium 10/16/2019 8.9  8.6 - 10.5 mg/dL Final   • Total Protein 10/16/2019 6.8  6.0 - 8.5 g/dL Final   • Albumin 10/16/2019 4.50  3.50 - 5.20 g/dL Final   • Globulin 10/16/2019 2.3  gm/dL Final   • A/G Ratio 10/16/2019 2.0  g/dL Final   • Total Bilirubin 10/16/2019 0.4  0.2 - 1.2 mg/dL Final   • Alkaline Phosphatase 10/16/2019 108  39 - 117 U/L Final   • AST (SGOT) 10/16/2019 17  1 - 32 U/L Final   • ALT (SGPT) 10/16/2019 16  1 - 33 U/L Final   • WBC 10/16/2019 6.11  3.40 - 10.80 10*3/mm3 Final   • RBC 10/16/2019 4.58  3.77 - 5.28 10*6/mm3 Final   • Hemoglobin 10/16/2019 12.8  12.0 - 15.9 g/dL Final   • Hematocrit 10/16/2019 39.3  34.0 - 46.6 % Final   • MCV 10/16/2019 85.8  79.0 - 97.0 fL Final   • MCH 10/16/2019 27.9  26.6 - 33.0 pg Final   • MCHC 10/16/2019 32.6  31.5 - 35.7 g/dL Final   • RDW 10/16/2019 13.3  12.3 - 15.4 % Final   • Platelets 10/16/2019 338  140 - 450 10*3/mm3 Final   • Neutrophil Rel % 10/16/2019 58.9  42.7 - 76.0 % Final   • Lymphocyte Rel % 10/16/2019 29.5  19.6 - 45.3 % Final   • Monocyte Rel % 10/16/2019 6.9  5.0 - 12.0 % Final   • Eosinophil Rel % 10/16/2019 3.4  0.3 - 6.2 % Final   • Basophil Rel % 10/16/2019 0.8  0.0 - 1.5 % Final   • Neutrophils Absolute 10/16/2019 3.60  1.70 - 7.00 10*3/mm3 Final   • Lymphocytes Absolute 10/16/2019 1.80  0.70 - 3.10 10*3/mm3 Final   • Monocytes Absolute 10/16/2019 0.42  0.10 - 0.90 10*3/mm3 Final   • Eosinophils Absolute 10/16/2019 0.21  0.00 - 0.40 10*3/mm3 Final   • Basophils Absolute 10/16/2019 0.05  0.00 - 0.20 10*3/mm3 Final   •  Immature Granulocyte Rel % 10/16/2019 0.5  0.0 - 0.5 % Final   • Immature Grans Absolute 10/16/2019 0.03  0.00 - 0.05 10*3/mm3 Final   • nRBC 10/16/2019 0.0  0.0 - 0.2 /100 WBC Final   • Total Cholesterol 10/16/2019 161  0 - 200 mg/dL Final   • Triglycerides 10/16/2019 132  0 - 150 mg/dL Final   • HDL Cholesterol 10/16/2019 47  40 - 60 mg/dL Final   • VLDL Cholesterol 10/16/2019 26.4  mg/dL Final   • LDL Cholesterol  10/16/2019 88  0 - 100 mg/dL Final   • Chol/HDL Ratio 10/16/2019 3.43   Final

## 2019-10-24 NOTE — PROGRESS NOTES
"Mook Munson is a 74 y.o. female presents for No chief complaint on file.      History of Present Illness     The following portions of the patient's history were reviewed and updated as appropriate: allergies, current medications, past family history, past medical history, past social history, past surgical history and problem list.    Review of Systems   All other systems reviewed and are negative.        Vitals:    10/24/19 0806   BP: 148/88   Pulse: 90   Resp: 16   Temp: 98.2 °F (36.8 °C)   SpO2: 98%   Weight: 68.5 kg (151 lb)   Height: 175.3 cm (69\")     Wt Readings from Last 3 Encounters:   10/24/19 68.5 kg (151 lb)   10/11/19 68.7 kg (151 lb 6.4 oz)   07/24/19 67.1 kg (148 lb)     BP Readings from Last 3 Encounters:   10/24/19 148/88   10/11/19 126/72   07/24/19 130/78     Social History     Socioeconomic History   • Marital status:      Spouse name: Not on file   • Number of children: Not on file   • Years of education: Not on file   • Highest education level: Not on file   Tobacco Use   • Smoking status: Never Smoker   • Smokeless tobacco: Never Used   Substance and Sexual Activity   • Alcohol use: No   • Drug use: Defer   • Sexual activity: Defer       Allergies   Allergen Reactions   • Augmentin [Amoxicillin-Pot Clavulanate] Rash   • Biaxin [Clarithromycin] Rash       Body mass index is 22.3 kg/m².    Objective   Physical Exam    Assessment/Plan   Diagnoses and all orders for this visit:    Medicare annual wellness visit, subsequent    Mixed hyperlipidemia          [facility]  [address] [id number]  Dept: [phone]  Dept Fax: [phone]  Loc: [phone]  Loc Fax: [phone]           Orders Only on 10/16/2019   Component Date Value Ref Range Status   • Urine Culture 10/16/2019 Final report   Final   • Result 1 10/16/2019 Comment   Final    Comment: Culture shows less " than 10,000 colony forming units of bacteria per  milliliter of urine. This colony count is not generally considered  to be clinically significant.     Orders Only on 10/16/2019   Component Date Value Ref Range Status   • Color 10/16/2019 Orange* Yellow, Straw, Dark Yellow, Malena Final   • Clarity, UA 10/16/2019 Clear  Clear Final   • Glucose, UA 10/16/2019 Negative  Negative, 1000 mg/dL (3+) mg/dL Final   • Bilirubin 10/16/2019 Small (1+)* Negative Final   • Ketones, UA 10/16/2019 Negative  Negative Final   • Specific Gravity  10/16/2019 1.010  1.005 - 1.030 Final   • Blood, UA 10/16/2019 Small* Negative Final   • pH, Urine 10/16/2019 7.0  5.0 - 8.0 Final   • Protein, POC 10/16/2019 Negative  Negative mg/dL Final   • Urobilinogen, UA 10/16/2019 Normal  Normal Final   • Leukocytes 10/16/2019 Negative  Negative Final   • Nitrite, UA 10/16/2019 Negative  Negative Final   Orders Only on 10/16/2019   Component Date Value Ref Range Status   • Glucose 10/16/2019 86  65 - 99 mg/dL Final   • BUN 10/16/2019 13  8 - 23 mg/dL Final   • Creatinine 10/16/2019 0.85  0.57 - 1.00 mg/dL Final   • eGFR Non  Am 10/16/2019 65  >60 mL/min/1.73 Final    Comment: The MDRD GFR formula is only valid for adults with stable  renal function between ages 18 and 70.     • eGFR  Am 10/16/2019 79  >60 mL/min/1.73 Final   • BUN/Creatinine Ratio 10/16/2019 15.3  7.0 - 25.0 Final   • Sodium 10/16/2019 141  136 - 145 mmol/L Final   • Potassium 10/16/2019 4.7  3.5 - 5.2 mmol/L Final   • Chloride 10/16/2019 102  98 - 107 mmol/L Final   • Total CO2 10/16/2019 28.7  22.0 - 29.0 mmol/L Final   • Calcium 10/16/2019 8.9  8.6 - 10.5 mg/dL Final   • Total Protein 10/16/2019 6.8  6.0 - 8.5 g/dL Final   • Albumin 10/16/2019 4.50  3.50 - 5.20 g/dL Final   • Globulin 10/16/2019 2.3  gm/dL Final   • A/G Ratio 10/16/2019 2.0  g/dL Final   • Total Bilirubin 10/16/2019 0.4  0.2 - 1.2 mg/dL Final   • Alkaline Phosphatase 10/16/2019 108  39 - 117 U/L Final    • AST (SGOT) 10/16/2019 17  1 - 32 U/L Final   • ALT (SGPT) 10/16/2019 16  1 - 33 U/L Final   • WBC 10/16/2019 6.11  3.40 - 10.80 10*3/mm3 Final   • RBC 10/16/2019 4.58  3.77 - 5.28 10*6/mm3 Final   • Hemoglobin 10/16/2019 12.8  12.0 - 15.9 g/dL Final   • Hematocrit 10/16/2019 39.3  34.0 - 46.6 % Final   • MCV 10/16/2019 85.8  79.0 - 97.0 fL Final   • MCH 10/16/2019 27.9  26.6 - 33.0 pg Final   • MCHC 10/16/2019 32.6  31.5 - 35.7 g/dL Final   • RDW 10/16/2019 13.3  12.3 - 15.4 % Final   • Platelets 10/16/2019 338  140 - 450 10*3/mm3 Final   • Neutrophil Rel % 10/16/2019 58.9  42.7 - 76.0 % Final   • Lymphocyte Rel % 10/16/2019 29.5  19.6 - 45.3 % Final   • Monocyte Rel % 10/16/2019 6.9  5.0 - 12.0 % Final   • Eosinophil Rel % 10/16/2019 3.4  0.3 - 6.2 % Final   • Basophil Rel % 10/16/2019 0.8  0.0 - 1.5 % Final   • Neutrophils Absolute 10/16/2019 3.60  1.70 - 7.00 10*3/mm3 Final   • Lymphocytes Absolute 10/16/2019 1.80  0.70 - 3.10 10*3/mm3 Final   • Monocytes Absolute 10/16/2019 0.42  0.10 - 0.90 10*3/mm3 Final   • Eosinophils Absolute 10/16/2019 0.21  0.00 - 0.40 10*3/mm3 Final   • Basophils Absolute 10/16/2019 0.05  0.00 - 0.20 10*3/mm3 Final   • Immature Granulocyte Rel % 10/16/2019 0.5  0.0 - 0.5 % Final   • Immature Grans Absolute 10/16/2019 0.03  0.00 - 0.05 10*3/mm3 Final   • nRBC 10/16/2019 0.0  0.0 - 0.2 /100 WBC Final   • Total Cholesterol 10/16/2019 161  0 - 200 mg/dL Final   • Triglycerides 10/16/2019 132  0 - 150 mg/dL Final   • HDL Cholesterol 10/16/2019 47  40 - 60 mg/dL Final   • VLDL Cholesterol 10/16/2019 26.4  mg/dL Final   • LDL Cholesterol  10/16/2019 88  0 - 100 mg/dL Final   • Chol/HDL Ratio 10/16/2019 3.43   Final

## 2019-10-29 ENCOUNTER — APPOINTMENT (OUTPATIENT)
Dept: BONE DENSITY | Facility: HOSPITAL | Age: 74
End: 2019-10-29

## 2019-10-29 DIAGNOSIS — Z78.0 POSTMENOPAUSAL: ICD-10-CM

## 2019-10-29 PROCEDURE — 77080 DXA BONE DENSITY AXIAL: CPT

## 2020-06-29 ENCOUNTER — TRANSCRIBE ORDERS (OUTPATIENT)
Dept: ADMINISTRATIVE | Facility: HOSPITAL | Age: 75
End: 2020-06-29

## 2020-06-29 DIAGNOSIS — Z12.31 VISIT FOR SCREENING MAMMOGRAM: Primary | ICD-10-CM

## 2020-06-30 DIAGNOSIS — E78.2 MIXED HYPERLIPIDEMIA: ICD-10-CM

## 2020-06-30 DIAGNOSIS — Z00.00 MEDICARE ANNUAL WELLNESS VISIT, SUBSEQUENT: Primary | ICD-10-CM

## 2020-06-30 DIAGNOSIS — Z00.00 ANNUAL PHYSICAL EXAM: ICD-10-CM

## 2020-06-30 DIAGNOSIS — Z11.59 NEED FOR HEPATITIS C SCREENING TEST: ICD-10-CM

## 2020-07-02 LAB
ALBUMIN SERPL-MCNC: 4.4 G/DL (ref 3.5–5.2)
ALBUMIN/GLOB SERPL: 1.8 G/DL
ALP SERPL-CCNC: 102 U/L (ref 39–117)
ALT SERPL-CCNC: 15 U/L (ref 1–33)
AST SERPL-CCNC: 16 U/L (ref 1–32)
BASOPHILS # BLD AUTO: 0.09 10*3/MM3 (ref 0–0.2)
BASOPHILS NFR BLD AUTO: 1.4 % (ref 0–1.5)
BILIRUB SERPL-MCNC: 0.5 MG/DL (ref 0.2–1.2)
BUN SERPL-MCNC: 15 MG/DL (ref 8–23)
BUN/CREAT SERPL: 17.4 (ref 7–25)
CALCIUM SERPL-MCNC: 9.1 MG/DL (ref 8.6–10.5)
CHLORIDE SERPL-SCNC: 103 MMOL/L (ref 98–107)
CHOLEST SERPL-MCNC: 166 MG/DL (ref 0–200)
CHOLEST/HDLC SERPL: 3.53 {RATIO}
CO2 SERPL-SCNC: 26.8 MMOL/L (ref 22–29)
CREAT SERPL-MCNC: 0.86 MG/DL (ref 0.57–1)
EOSINOPHIL # BLD AUTO: 0.19 10*3/MM3 (ref 0–0.4)
EOSINOPHIL NFR BLD AUTO: 3.1 % (ref 0.3–6.2)
ERYTHROCYTE [DISTWIDTH] IN BLOOD BY AUTOMATED COUNT: 13.6 % (ref 12.3–15.4)
GLOBULIN SER CALC-MCNC: 2.4 GM/DL
GLUCOSE SERPL-MCNC: 81 MG/DL (ref 65–99)
HCT VFR BLD AUTO: 39.7 % (ref 34–46.6)
HCV AB S/CO SERPL IA: 0.1 S/CO RATIO (ref 0–0.9)
HDLC SERPL-MCNC: 47 MG/DL (ref 40–60)
HGB BLD-MCNC: 13.1 G/DL (ref 12–15.9)
IMM GRANULOCYTES # BLD AUTO: 0.01 10*3/MM3 (ref 0–0.05)
IMM GRANULOCYTES NFR BLD AUTO: 0.2 % (ref 0–0.5)
LDLC SERPL CALC-MCNC: 83 MG/DL (ref 0–100)
LYMPHOCYTES # BLD AUTO: 1.79 10*3/MM3 (ref 0.7–3.1)
LYMPHOCYTES NFR BLD AUTO: 28.8 % (ref 19.6–45.3)
MCH RBC QN AUTO: 27.9 PG (ref 26.6–33)
MCHC RBC AUTO-ENTMCNC: 33 G/DL (ref 31.5–35.7)
MCV RBC AUTO: 84.6 FL (ref 79–97)
MONOCYTES # BLD AUTO: 0.41 10*3/MM3 (ref 0.1–0.9)
MONOCYTES NFR BLD AUTO: 6.6 % (ref 5–12)
NEUTROPHILS # BLD AUTO: 3.73 10*3/MM3 (ref 1.7–7)
NEUTROPHILS NFR BLD AUTO: 59.9 % (ref 42.7–76)
NRBC BLD AUTO-RTO: 0 /100 WBC (ref 0–0.2)
PLATELET # BLD AUTO: 344 10*3/MM3 (ref 140–450)
POTASSIUM SERPL-SCNC: 4.3 MMOL/L (ref 3.5–5.2)
PROT SERPL-MCNC: 6.8 G/DL (ref 6–8.5)
RBC # BLD AUTO: 4.69 10*6/MM3 (ref 3.77–5.28)
SODIUM SERPL-SCNC: 142 MMOL/L (ref 136–145)
TRIGL SERPL-MCNC: 178 MG/DL (ref 0–150)
VLDLC SERPL CALC-MCNC: 35.6 MG/DL
WBC # BLD AUTO: 6.22 10*3/MM3 (ref 3.4–10.8)

## 2020-07-13 ENCOUNTER — OFFICE VISIT (OUTPATIENT)
Dept: FAMILY MEDICINE CLINIC | Facility: CLINIC | Age: 75
End: 2020-07-13

## 2020-07-13 DIAGNOSIS — Z00.00 MEDICARE ANNUAL WELLNESS VISIT, SUBSEQUENT: Primary | ICD-10-CM

## 2020-07-13 DIAGNOSIS — E78.2 MIXED HYPERLIPIDEMIA: Primary | ICD-10-CM

## 2020-07-13 DIAGNOSIS — E78.2 MIXED HYPERLIPIDEMIA: ICD-10-CM

## 2020-07-13 PROBLEM — J01.00 ACUTE MAXILLARY SINUSITIS: Status: RESOLVED | Noted: 2019-07-24 | Resolved: 2020-07-13

## 2020-07-13 PROCEDURE — 99442 PR PHYS/QHP TELEPHONE EVALUATION 11-20 MIN: CPT | Performed by: PHYSICIAN ASSISTANT

## 2020-07-13 RX ORDER — ROSUVASTATIN CALCIUM 20 MG/1
20 TABLET, COATED ORAL NIGHTLY
Qty: 90 TABLET | Refills: 3 | Status: SHIPPED | OUTPATIENT
Start: 2020-07-13 | End: 2021-04-30

## 2020-07-13 NOTE — PROGRESS NOTES
Subjective   Codie Munson is a 74 y.o. female presents for   Chief Complaint   Patient presents with   • Hyperlipidemia     Management     You have chosen to receive care through a telephone visit. Do you consent to use a telephone visit for your medical care today? Yes  History of Present Illness     Codie a 74-year-old female who presents using telephone encounter/telehealth visit today for hyperlipidemia.  States her diet has been improving.  She has been trying to eat healthier by decreasing carbohydrates and fatty food in diet.  Sleep has been normal.  Denied any chest pain, shortness of air, dizziness, vision changes or swelling of ankles.  She has been walking for exercising.  States her  tends to like to eat potatoes 3 times a day.  She has been doing well with the Crestor medication.  Denied any muscle aches or issues with the Crestor.  No complaints today.    The following portions of the patient's history were reviewed and updated as appropriate: allergies, current medications, past family history, past medical history, past social history, past surgical history and problem list.    Review of Systems   Constitutional: Negative.  Negative for chills, fatigue and fever.   HENT: Negative.    Eyes: Negative.    Respiratory: Negative.  Negative for cough, chest tightness, shortness of breath and wheezing.    Cardiovascular: Negative.  Negative for chest pain, palpitations and leg swelling.   Gastrointestinal: Negative.    Endocrine: Negative.    Genitourinary: Negative.    Musculoskeletal: Negative.    Skin: Negative.    Allergic/Immunologic: Negative.    Neurological: Negative.  Negative for dizziness, light-headedness and headaches.   Hematological: Negative.    Psychiatric/Behavioral: Negative.    All other systems reviewed and are negative.      Advance Care Planning   ACP discussion was held with the patient during this visit. Patient does not have an advance directive, declines further  assistance.    No vital signs were obtained due to telephone encounter.    There were no vitals filed for this visit.  Wt Readings from Last 3 Encounters:   06/14/20 69.9 kg (154 lb)   10/24/19 68.5 kg (151 lb)   10/11/19 68.7 kg (151 lb 6.4 oz)     BP Readings from Last 3 Encounters:   06/14/20 117/78   10/24/19 122/78   10/11/19 126/72     Social History     Socioeconomic History   • Marital status:      Spouse name: Not on file   • Number of children: Not on file   • Years of education: Not on file   • Highest education level: Not on file   Tobacco Use   • Smoking status: Never Smoker   • Smokeless tobacco: Never Used   Substance and Sexual Activity   • Alcohol use: No   • Drug use: Defer   • Sexual activity: Defer       Allergies   Allergen Reactions   • Augmentin [Amoxicillin-Pot Clavulanate] Rash   • Biaxin [Clarithromycin] Rash       There is no height or weight on file to calculate BMI.    Objective   Physical Exam   Constitutional: She is oriented to person, place, and time.   Alert and oriented female in no respiratory distress   HENT:   Right Ear: Hearing normal.   Left Ear: Hearing normal.   Pulmonary/Chest: Breath sounds normal.   Breath sounds appear to be normal without wheezing or coughing during the telephone encounter.   Neurological: She is alert and oriented to person, place, and time.   Psychiatric: She has a normal mood and affect. Her speech is normal and behavior is normal. Judgment and thought content normal. Cognition and memory are normal.     Physical exam was limited due to telephone encounter.    Assessment/Plan   Codie was seen today for hyperlipidemia.    Diagnoses and all orders for this visit:    Mixed hyperlipidemia  -     rosuvastatin (CRESTOR) 20 MG tablet; Take 1 tablet by mouth Every Night.      Mrs. Codie Munson was seen using telephone encounter today for hyperlipidemia management.  I have reviewed her lab work that was collected on July 1, 2020 with her.  Fasting  blood sugar was 81, cholesterol 166, triglycerides 178, HDL 47 and LDL was 83.  States she does eat a lot of potatoes in her diet.  I encouraged her to decrease her potatoes in diet and increase physical activity.  Will repeat fasting lab work in October 2020 with her Medicare wellness exam.  Codie has asked to have lab work collected at [facility] if possible.  Will send CBC, CMP, and lipid profile orders to her home to have collected at least 4-5 days prior to her office visit in October.  She voiced understanding.    I spend 15 minutes discussing her test results, hyperlipidemia and medication usage during the telephone encounter..    [facility]  [address] [id number]  Dept: [phone]  Dept Fax: [phone]  Loc: [phone]  Loc Fax: [phone]           Orders Only on 06/30/2020   Component Date Value Ref Range Status   • Glucose 07/01/2020 81  65 - 99 mg/dL Final   • BUN 07/01/2020 15  8 - 23 mg/dL Final   • Creatinine 07/01/2020 0.86  0.57 - 1.00 mg/dL Final   • eGFR Non African Am 07/01/2020 64  >60 mL/min/1.73 Final    Comment: The MDRD GFR formula is only valid for adults with stable  renal function between ages 18 and 70.     • eGFR  Am 07/01/2020 78  >60 mL/min/1.73 Final   • BUN/Creatinine Ratio 07/01/2020 17.4  7.0 - 25.0 Final   • Sodium 07/01/2020 142  136 - 145 mmol/L Final   • Potassium 07/01/2020 4.3  3.5 - 5.2 mmol/L Final   • Chloride 07/01/2020 103  98 - 107 mmol/L Final   • Total CO2 07/01/2020 26.8  22.0 - 29.0 mmol/L Final   • Calcium 07/01/2020 9.1  8.6 - 10.5 mg/dL Final   • Total Protein 07/01/2020 6.8  6.0 - 8.5 g/dL Final   • Albumin 07/01/2020 4.40  3.50 - 5.20 g/dL Final   • Globulin 07/01/2020 2.4  gm/dL Final   • A/G Ratio 07/01/2020 1.8  g/dL Final   • Total Bilirubin 07/01/2020 0.5  0.2 - 1.2 mg/dL Final   • Alkaline Phosphatase 07/01/2020 102   39 - 117 U/L Final   • AST (SGOT) 07/01/2020 16  1 - 32 U/L Final   • ALT (SGPT) 07/01/2020 15  1 - 33 U/L Final   • WBC 07/01/2020 6.22  3.40 - 10.80 10*3/mm3 Final   • RBC 07/01/2020 4.69  3.77 - 5.28 10*6/mm3 Final   • Hemoglobin 07/01/2020 13.1  12.0 - 15.9 g/dL Final   • Hematocrit 07/01/2020 39.7  34.0 - 46.6 % Final   • MCV 07/01/2020 84.6  79.0 - 97.0 fL Final   • MCH 07/01/2020 27.9  26.6 - 33.0 pg Final   • MCHC 07/01/2020 33.0  31.5 - 35.7 g/dL Final   • RDW 07/01/2020 13.6  12.3 - 15.4 % Final   • Platelets 07/01/2020 344  140 - 450 10*3/mm3 Final   • Neutrophil Rel % 07/01/2020 59.9  42.7 - 76.0 % Final   • Lymphocyte Rel % 07/01/2020 28.8  19.6 - 45.3 % Final   • Monocyte Rel % 07/01/2020 6.6  5.0 - 12.0 % Final   • Eosinophil Rel % 07/01/2020 3.1  0.3 - 6.2 % Final   • Basophil Rel % 07/01/2020 1.4  0.0 - 1.5 % Final   • Neutrophils Absolute 07/01/2020 3.73  1.70 - 7.00 10*3/mm3 Final   • Lymphocytes Absolute 07/01/2020 1.79  0.70 - 3.10 10*3/mm3 Final   • Monocytes Absolute 07/01/2020 0.41  0.10 - 0.90 10*3/mm3 Final   • Eosinophils Absolute 07/01/2020 0.19  0.00 - 0.40 10*3/mm3 Final   • Basophils Absolute 07/01/2020 0.09  0.00 - 0.20 10*3/mm3 Final   • Immature Granulocyte Rel % 07/01/2020 0.2  0.0 - 0.5 % Final   • Immature Grans Absolute 07/01/2020 0.01  0.00 - 0.05 10*3/mm3 Final   • nRBC 07/01/2020 0.0  0.0 - 0.2 /100 WBC Final   • Total Cholesterol 07/01/2020 166  0 - 200 mg/dL Final   • Triglycerides 07/01/2020 178* 0 - 150 mg/dL Final   • HDL Cholesterol 07/01/2020 47  40 - 60 mg/dL Final   • VLDL Cholesterol 07/01/2020 35.6  mg/dL Final   • LDL Cholesterol  07/01/2020 83  0 - 100 mg/dL Final   • Chol/HDL Ratio 07/01/2020 3.53   Final   • Hep C Virus Ab 07/01/2020 0.1  0.0 - 0.9 s/co ratio Final    Comment:                                   Negative:     < 0.8                               Indeterminate: 0.8 - 0.9                                    Positive:     > 0.9   The CDC recommends  that a positive HCV antibody result   be followed up with a HCV Nucleic Acid Amplification   test (221570).

## 2020-07-31 ENCOUNTER — HOSPITAL ENCOUNTER (OUTPATIENT)
Dept: MAMMOGRAPHY | Facility: HOSPITAL | Age: 75
Discharge: HOME | End: 2020-07-31
Admitting: PHYSICIAN ASSISTANT

## 2020-07-31 DIAGNOSIS — Z12.31 VISIT FOR SCREENING MAMMOGRAM: ICD-10-CM

## 2020-07-31 PROCEDURE — 77067 SCR MAMMO BI INCL CAD: CPT

## 2020-07-31 PROCEDURE — 77063 BREAST TOMOSYNTHESIS BI: CPT

## 2020-10-22 ENCOUNTER — LAB (OUTPATIENT)
Dept: LAB | Facility: HOSPITAL | Age: 75
End: 2020-10-22

## 2020-10-22 DIAGNOSIS — Z00.00 MEDICARE ANNUAL WELLNESS VISIT, SUBSEQUENT: ICD-10-CM

## 2020-10-22 DIAGNOSIS — E78.2 MIXED HYPERLIPIDEMIA: ICD-10-CM

## 2020-10-22 LAB
ALBUMIN SERPL-MCNC: 4.3 G/DL (ref 3.5–5.2)
ALBUMIN/GLOB SERPL: 1.9 G/DL
ALP SERPL-CCNC: 114 U/L (ref 39–117)
ALT SERPL W P-5'-P-CCNC: 18 U/L (ref 1–33)
ANION GAP SERPL CALCULATED.3IONS-SCNC: 9.9 MMOL/L (ref 5–15)
AST SERPL-CCNC: 20 U/L (ref 1–32)
BASOPHILS # BLD AUTO: 0.07 10*3/MM3 (ref 0–0.2)
BASOPHILS NFR BLD AUTO: 1.1 % (ref 0–1.5)
BILIRUB SERPL-MCNC: 0.3 MG/DL (ref 0–1.2)
BUN SERPL-MCNC: 11 MG/DL (ref 8–23)
BUN/CREAT SERPL: 12.1 (ref 7–25)
CALCIUM SPEC-SCNC: 9.1 MG/DL (ref 8.6–10.5)
CHLORIDE SERPL-SCNC: 104 MMOL/L (ref 98–107)
CHOLEST SERPL-MCNC: 160 MG/DL (ref 0–200)
CO2 SERPL-SCNC: 29.1 MMOL/L (ref 22–29)
CREAT SERPL-MCNC: 0.91 MG/DL (ref 0.57–1)
DEPRECATED RDW RBC AUTO: 41 FL (ref 37–54)
EOSINOPHIL # BLD AUTO: 0.17 10*3/MM3 (ref 0–0.4)
EOSINOPHIL NFR BLD AUTO: 2.7 % (ref 0.3–6.2)
ERYTHROCYTE [DISTWIDTH] IN BLOOD BY AUTOMATED COUNT: 13.6 % (ref 12.3–15.4)
GFR SERPL CREATININE-BSD FRML MDRD: 60 ML/MIN/1.73
GLOBULIN UR ELPH-MCNC: 2.3 GM/DL
GLUCOSE SERPL-MCNC: 89 MG/DL (ref 65–99)
HCT VFR BLD AUTO: 40.5 % (ref 34–46.6)
HDLC SERPL-MCNC: 48 MG/DL (ref 40–60)
HGB BLD-MCNC: 13.5 G/DL (ref 12–15.9)
IMM GRANULOCYTES # BLD AUTO: 0.03 10*3/MM3 (ref 0–0.05)
IMM GRANULOCYTES NFR BLD AUTO: 0.5 % (ref 0–0.5)
LDLC SERPL CALC-MCNC: 82 MG/DL (ref 0–100)
LDLC/HDLC SERPL: 1.6 {RATIO}
LYMPHOCYTES # BLD AUTO: 2.09 10*3/MM3 (ref 0.7–3.1)
LYMPHOCYTES NFR BLD AUTO: 33.5 % (ref 19.6–45.3)
MCH RBC QN AUTO: 27.9 PG (ref 26.6–33)
MCHC RBC AUTO-ENTMCNC: 33.3 G/DL (ref 31.5–35.7)
MCV RBC AUTO: 83.7 FL (ref 79–97)
MONOCYTES # BLD AUTO: 0.38 10*3/MM3 (ref 0.1–0.9)
MONOCYTES NFR BLD AUTO: 6.1 % (ref 5–12)
NEUTROPHILS NFR BLD AUTO: 3.49 10*3/MM3 (ref 1.7–7)
NEUTROPHILS NFR BLD AUTO: 56.1 % (ref 42.7–76)
NRBC BLD AUTO-RTO: 0 /100 WBC (ref 0–0.2)
PLATELET # BLD AUTO: 282 10*3/MM3 (ref 140–450)
PMV BLD AUTO: 11 FL (ref 6–12)
POTASSIUM SERPL-SCNC: 4.7 MMOL/L (ref 3.5–5.2)
PROT SERPL-MCNC: 6.6 G/DL (ref 6–8.5)
RBC # BLD AUTO: 4.84 10*6/MM3 (ref 3.77–5.28)
SODIUM SERPL-SCNC: 143 MMOL/L (ref 136–145)
TRIGL SERPL-MCNC: 177 MG/DL (ref 0–150)
VLDLC SERPL-MCNC: 30 MG/DL (ref 5–40)
WBC # BLD AUTO: 6.23 10*3/MM3 (ref 3.4–10.8)

## 2020-10-22 PROCEDURE — 36415 COLL VENOUS BLD VENIPUNCTURE: CPT

## 2020-10-22 PROCEDURE — 85025 COMPLETE CBC W/AUTO DIFF WBC: CPT

## 2020-10-22 PROCEDURE — 80061 LIPID PANEL: CPT

## 2020-10-22 PROCEDURE — 80053 COMPREHEN METABOLIC PANEL: CPT

## 2020-10-28 ENCOUNTER — OFFICE VISIT (OUTPATIENT)
Dept: FAMILY MEDICINE CLINIC | Facility: CLINIC | Age: 75
End: 2020-10-28

## 2020-10-28 VITALS
RESPIRATION RATE: 16 BRPM | DIASTOLIC BLOOD PRESSURE: 88 MMHG | HEART RATE: 103 BPM | BODY MASS INDEX: 24.66 KG/M2 | HEIGHT: 65 IN | TEMPERATURE: 97.6 F | SYSTOLIC BLOOD PRESSURE: 138 MMHG | OXYGEN SATURATION: 98 % | WEIGHT: 148 LBS

## 2020-10-28 DIAGNOSIS — E78.2 MIXED HYPERLIPIDEMIA: ICD-10-CM

## 2020-10-28 DIAGNOSIS — Z00.00 MEDICARE ANNUAL WELLNESS VISIT, SUBSEQUENT: Primary | ICD-10-CM

## 2020-10-28 DIAGNOSIS — Z23 NEED FOR PNEUMOCOCCAL VACCINATION: ICD-10-CM

## 2020-10-28 PROCEDURE — G0439 PPPS, SUBSEQ VISIT: HCPCS | Performed by: PHYSICIAN ASSISTANT

## 2020-10-28 PROCEDURE — G0009 ADMIN PNEUMOCOCCAL VACCINE: HCPCS | Performed by: PHYSICIAN ASSISTANT

## 2020-10-28 PROCEDURE — 90732 PPSV23 VACC 2 YRS+ SUBQ/IM: CPT | Performed by: PHYSICIAN ASSISTANT

## 2020-10-28 NOTE — PROGRESS NOTES
The ABCs of the Annual Wellness Visit  Subsequent Medicare Wellness Visit    Chief Complaint   Patient presents with   • Medicare Wellness-subsequent       Subjective   History of Present Illness:  Codie Munson is a 75 y.o. female who presents for a Subsequent Medicare Wellness Visit.  Codie states she is feeling well at office visit today.  She has been cutting back on sugars and carbohydrates.  She has been trying to walk at least 2-3 times weekly.  Sleep has been normal.  States she had the high-dose flu shot at [facility] in [place] in September 2020.  Bowel movements have been daily without dark black tarry stools.  Has no complaints today.  Denied any fevers, chills, upper respiratory symptoms, chest pain, shortness of air, cough, GI upset/abdominal pain or swelling of ankles.  She will be seen in the-year-old gynecologist this afternoon.  Has been using a pessary which has helped with her urinary issues.    HEALTH RISK ASSESSMENT    Recent Hospitalizations:  No hospitalization(s) within the last year.    Current Medical Providers:  Patient Care Team:  Sri Leach PA-C as PCP - General (Family Medicine)    Smoking Status:  Social History     Tobacco Use   Smoking Status Never Smoker   Smokeless Tobacco Never Used       Alcohol Consumption:  Social History     Substance and Sexual Activity   Alcohol Use No       Depression Screen:   PHQ-2/PHQ-9 Depression Screening 10/28/2020   Little interest or pleasure in doing things 0   Feeling down, depressed, or hopeless 0   Total Score 0       Fall Risk Screen:  IRIS Fall Risk Assessment was completed, and patient is at LOW risk for falls.Assessment completed on:10/28/2020    Health Habits and Functional and Cognitive Screening:  Functional & Cognitive Status 10/28/2020   Do you have difficulty preparing food and eating? No   Do you have difficulty bathing yourself, getting dressed or grooming yourself? No   Do you have difficulty using the toilet?  No   Do you have difficulty moving around from place to place? No   Do you have trouble with steps or getting out of a bed or a chair? No   Current Diet Well Balanced Diet   Dental Exam Up to date   Eye Exam Up to date   Exercise (times per week) 2 times per week   Current Exercise Activities Include Walking   Do you need help using the phone?  No   Are you deaf or do you have serious difficulty hearing?  No   Do you need help with transportation? No   Do you need help shopping? No   Do you need help preparing meals?  No   Do you need help with housework?  No   Do you need help with laundry? No   Do you need help taking your medications? No   Do you need help managing money? No   Do you ever drive or ride in a car without wearing a seat belt? No         Does the patient have evidence of cognitive impairment? No    Asprin use counseling:Taking ASA appropriately as indicated    Age-appropriate Screening Schedule:  Refer to the list below for future screening recommendations based on patient's age, sex and/or medical conditions. Orders for these recommended tests are listed in the plan section. The patient has been provided with a written plan.    Health Maintenance   Topic Date Due   • INFLUENZA VACCINE  2020   • LIPID PANEL  10/22/2021   • MAMMOGRAM  Discontinued   • COLONOSCOPY  Discontinued   • TDAP/TD VACCINES  Discontinued   • ZOSTER VACCINE  Discontinued          The following portions of the patient's history were reviewed and updated as appropriate:   She  has a past medical history of Cardiomyopathy (CMS/HCC), Chronic cystitis, Cough, Depression, GERD (gastroesophageal reflux disease), Hyperlipidemia, Hypertension, Scarlet fever, Strain of thoracic region, Urethral prolapse, URI (upper respiratory infection), and Urinary retention.  She does not have any pertinent problems on file.  She  has a past surgical history that includes Appendectomy;  section; Bladder surgery; Cardiac catheterization;  and Vein ligation and stripping (2019 6/04).  Her family history includes Aneurysm in her father; Cancer in her brother and mother; Crohn's disease in her sister; Heart disease in her father; Heart failure in her mother; Kidney failure in her mother; Prostate cancer in her brother; Stroke in her father.  She  reports that she has never smoked. She has never used smokeless tobacco. Drug use questions deferred to the physician. She reports that she does not drink alcohol.  Current Outpatient Medications   Medication Sig Dispense Refill   • aspirin 81 MG chewable tablet Chew 81 mg 3 (Three) Times a Week.     • Cholecalciferol (VITAMIN D3) 2000 units tablet Take  by mouth.     • conjugated estrogens (PREMARIN) 0.625 MG/GM vaginal cream USE A PEA-SIZED AMOUNT ON FINGERTIP AND PLACE IN THE VAGINAL OPENING 3 X PER WEEK.     • esomeprazole (NexIUM) 40 MG capsule Take 40 mg by mouth every morning before breakfast.     • fluticasone (FLONASE) 50 MCG/ACT nasal spray 2 sprays into the nostril(s) as directed by provider As Needed.     • rosuvastatin (CRESTOR) 20 MG tablet Take 1 tablet by mouth Every Night. 90 tablet 3     No current facility-administered medications for this visit.      Current Outpatient Medications on File Prior to Visit   Medication Sig   • aspirin 81 MG chewable tablet Chew 81 mg 3 (Three) Times a Week.   • Cholecalciferol (VITAMIN D3) 2000 units tablet Take  by mouth.   • conjugated estrogens (PREMARIN) 0.625 MG/GM vaginal cream USE A PEA-SIZED AMOUNT ON FINGERTIP AND PLACE IN THE VAGINAL OPENING 3 X PER WEEK.   • esomeprazole (NexIUM) 40 MG capsule Take 40 mg by mouth every morning before breakfast.   • fluticasone (FLONASE) 50 MCG/ACT nasal spray 2 sprays into the nostril(s) as directed by provider As Needed.   • rosuvastatin (CRESTOR) 20 MG tablet Take 1 tablet by mouth Every Night.     No current facility-administered medications on file prior to visit.      She is allergic to augmentin  [amoxicillin-pot clavulanate] and biaxin [clarithromycin]..    Outpatient Medications Prior to Visit   Medication Sig Dispense Refill   • aspirin 81 MG chewable tablet Chew 81 mg 3 (Three) Times a Week.     • Cholecalciferol (VITAMIN D3) 2000 units tablet Take  by mouth.     • conjugated estrogens (PREMARIN) 0.625 MG/GM vaginal cream USE A PEA-SIZED AMOUNT ON FINGERTIP AND PLACE IN THE VAGINAL OPENING 3 X PER WEEK.     • esomeprazole (NexIUM) 40 MG capsule Take 40 mg by mouth every morning before breakfast.     • fluticasone (FLONASE) 50 MCG/ACT nasal spray 2 sprays into the nostril(s) as directed by provider As Needed.     • rosuvastatin (CRESTOR) 20 MG tablet Take 1 tablet by mouth Every Night. 90 tablet 3   • magnesium oxide (MAG-OX) 400 MG tablet Take 250 mg by mouth Every Other Day.       No facility-administered medications prior to visit.        Patient Active Problem List   Diagnosis   • Myocardiopathy (CMS/HCC)   • Bladder infection, chronic   • Blues   • Gastro-esophageal reflux disease without esophagitis   • HLD (hyperlipidemia)   • Prolapse of urethra   • Incomplete bladder emptying   • Gastroesophageal reflux disease with hiatal hernia   • Erosive gastritis   • Diverticulosis of large intestine without hemorrhage   • Chronic UTI   • Foot sprain   • Medicare annual wellness visit, subsequent   • Screening mammogram, encounter for   • Lower abdominal pain   • Hematuria   • Left knee pain   • Chondromalacia of patellofemoral joint, left   • Acute left ankle pain   • Varicose veins of both lower extremities   • Pain and swelling of left lower leg   • Postmenopausal       Advanced Care Planning:  ACP discussion was held with the patient during this visit. Patient does not have an advance directive, declines further assistance.    Review of Systems   Constitutional: Negative.  Negative for chills, fatigue and fever.   HENT: Negative.    Eyes: Negative.    Respiratory: Negative.  Negative for cough, chest  "tightness, shortness of breath and wheezing.    Cardiovascular: Negative.  Negative for chest pain, palpitations and leg swelling.   Gastrointestinal: Negative.    Endocrine: Negative.    Genitourinary: Negative.    Musculoskeletal: Negative.    Skin: Negative.    Allergic/Immunologic: Negative.    Neurological: Negative.    Hematological: Negative.    Psychiatric/Behavioral: Negative.    All other systems reviewed and are negative.      Compared to one year ago, the patient feels her physical health is the same.  Compared to one year ago, the patient feels her mental health is the same.    Reviewed chart for potential of high risk medication in the elderly: yes  Reviewed chart for potential of harmful drug interactions in the elderly:yes    Objective         Vitals:    10/28/20 0827   BP: 138/88   Pulse: 103   Resp: 16   Temp: 97.6 °F (36.4 °C)   SpO2: 98%   Weight: 67.1 kg (148 lb)   Height: 165.1 cm (65\")       Body mass index is 24.63 kg/m².  Discussed the patient's BMI with her. The BMI is in the acceptable range.    Physical Exam  Constitutional:       Appearance: Normal appearance. She is well-developed, well-groomed and normal weight.      Interventions: Face mask in place.   HENT:      Head: Normocephalic and atraumatic.   Neck:      Thyroid: No thyroid mass, thyromegaly or thyroid tenderness.      Trachea: Trachea and phonation normal.   Cardiovascular:      Rate and Rhythm: Normal rate and regular rhythm.      Pulses: Normal pulses.      Heart sounds: Normal heart sounds, S1 normal and S2 normal. No murmur.   Pulmonary:      Effort: Pulmonary effort is normal.      Breath sounds: Normal breath sounds.   Abdominal:      General: Bowel sounds are normal.      Palpations: Abdomen is soft. There is no hepatomegaly.      Tenderness: There is no abdominal tenderness.   Musculoskeletal:      Right lower leg: No edema.      Left lower leg: No edema.   Skin:     General: Skin is warm and dry.      Capillary " Refill: Capillary refill takes less than 2 seconds.   Neurological:      Mental Status: She is alert and oriented to person, place, and time.      Deep Tendon Reflexes:      Reflex Scores:       Patellar reflexes are 2+ on the right side and 2+ on the left side.  Psychiatric:         Attention and Perception: Attention and perception normal.         Mood and Affect: Mood and affect normal.         Speech: Speech normal.         Behavior: Behavior normal. Behavior is cooperative.         Thought Content: Thought content normal.         Cognition and Memory: Cognition and memory normal.         Judgment: Judgment normal.      I was wearing surgical mask during the entire office visit encounter.      Lab Results   Component Value Date    TRIG 177 (H) 10/22/2020    HDL 48 10/22/2020    LDL 82 10/22/2020    VLDL 30 10/22/2020        Assessment/Plan   Medicare Risks and Personalized Health Plan  CMS Preventative Services Quick Reference  Advance Directive Discussion  Immunizations Discussed/Encouraged (specific immunizations; Pneumococcal 23 )    The above risks/problems have been discussed with the patient.  Pertinent information has been shared with the patient in the After Visit Summary.  Follow up plans and orders are seen below in the Assessment/Plan Section.    Diagnoses and all orders for this visit:    1. Medicare annual wellness visit, subsequent (Primary)    2. Mixed hyperlipidemia    3. Need for pneumococcal vaccination  -     Pneumococcal Polysaccharide Vaccine 23-Valent Greater Than or Equal To 1yo Subcutaneous / IM    1.  Annual Medicare wellness examination with hyperlipidemia: I have reviewed her lab work that was collected on October 22, 2020 with her.  Cholesterol was 160, triglycerides 177, HDL 48, LDL 82 and fasting blood sugar was 89.  Stressed the importance of continue to decrease carbohydrates and sugar in diet.  Will recheck blood work in 6 months.  2.  Need for updated pneumonia vaccine: She has  given written consent to receive up dated Pneumovax today.  Had flu shot at the local pharmacy in [place] in September 2020.    Patient Counseling:  --Nutrition: Stressed importance of moderation in sodium/caffeine intake, saturated fat and cholesterol.  Discussed caloric balance, sufficient intake of fresh fruits, vegetables, fiber,   calcium, iron.  --Discussed the new recommendation against daily use of baby aspirin for primary prevention in low risk patients.  --Exercise: Stressed the importance of regular exercise by incorporating into daily routine.    --Substance Abuse: Discussed cessation/primary prevention of tobacco, alcohol, or other drug use; driving or other dangerous activities under the influence.    --Dental health: Discussed importance of regular tooth brushing, flossing, and dental visits.  -- Suggested having eyes and vision checked if needed or past due.  --Immunizations reviewed.  Updated pneumonia vaccine given today.  --Discussed benefits of screening colonoscopy.  Colonoscopy is up-to-date      Follow Up:  Return in about 6 months (around 4/28/2021) for Chol/HTN labs prior.     An After Visit Summary and PPPS were given to the patient.      Lab on 10/22/2020   Component Date Value Ref Range Status   • Total Cholesterol 10/22/2020 160  0 - 200 mg/dL Final   • Triglycerides 10/22/2020 177* 0 - 150 mg/dL Final   • HDL Cholesterol 10/22/2020 48  40 - 60 mg/dL Final   • LDL Cholesterol  10/22/2020 82  0 - 100 mg/dL Final   • VLDL Cholesterol 10/22/2020 30  5 - 40 mg/dL Final   • LDL/HDL Ratio 10/22/2020 1.60   Final   • Glucose 10/22/2020 89  65 - 99 mg/dL Final   • BUN 10/22/2020 11  8 - 23 mg/dL Final   • Creatinine 10/22/2020 0.91  0.57 - 1.00 mg/dL Final   • Sodium 10/22/2020 143  136 - 145 mmol/L Final   • Potassium 10/22/2020 4.7  3.5 - 5.2 mmol/L Final   • Chloride 10/22/2020 104  98 - 107 mmol/L Final   • CO2 10/22/2020 29.1* 22.0 - 29.0 mmol/L Final   • Calcium 10/22/2020 9.1  8.6 - 10.5  mg/dL Final   • Total Protein 10/22/2020 6.6  6.0 - 8.5 g/dL Final   • Albumin 10/22/2020 4.30  3.50 - 5.20 g/dL Final   • ALT (SGPT) 10/22/2020 18  1 - 33 U/L Final   • AST (SGOT) 10/22/2020 20  1 - 32 U/L Final   • Alkaline Phosphatase 10/22/2020 114  39 - 117 U/L Final   • Total Bilirubin 10/22/2020 0.3  0.0 - 1.2 mg/dL Final   • eGFR Non African Amer 10/22/2020 60* >60 mL/min/1.73 Final   • Globulin 10/22/2020 2.3  gm/dL Final   • A/G Ratio 10/22/2020 1.9  g/dL Final   • BUN/Creatinine Ratio 10/22/2020 12.1  7.0 - 25.0 Final   • Anion Gap 10/22/2020 9.9  5.0 - 15.0 mmol/L Final   • WBC 10/22/2020 6.23  3.40 - 10.80 10*3/mm3 Final   • RBC 10/22/2020 4.84  3.77 - 5.28 10*6/mm3 Final   • Hemoglobin 10/22/2020 13.5  12.0 - 15.9 g/dL Final   • Hematocrit 10/22/2020 40.5  34.0 - 46.6 % Final   • MCV 10/22/2020 83.7  79.0 - 97.0 fL Final   • MCH 10/22/2020 27.9  26.6 - 33.0 pg Final   • MCHC 10/22/2020 33.3  31.5 - 35.7 g/dL Final   • RDW 10/22/2020 13.6  12.3 - 15.4 % Final   • RDW-SD 10/22/2020 41.0  37.0 - 54.0 fl Final   • MPV 10/22/2020 11.0  6.0 - 12.0 fL Final   • Platelets 10/22/2020 282  140 - 450 10*3/mm3 Final   • Neutrophil % 10/22/2020 56.1  42.7 - 76.0 % Final   • Lymphocyte % 10/22/2020 33.5  19.6 - 45.3 % Final   • Monocyte % 10/22/2020 6.1  5.0 - 12.0 % Final   • Eosinophil % 10/22/2020 2.7  0.3 - 6.2 % Final   • Basophil % 10/22/2020 1.1  0.0 - 1.5 % Final   • Immature Grans % 10/22/2020 0.5  0.0 - 0.5 % Final   • Neutrophils, Absolute 10/22/2020 3.49  1.70 - 7.00 10*3/mm3 Final   • Lymphocytes, Absolute 10/22/2020 2.09  0.70 - 3.10 10*3/mm3 Final   • Monocytes, Absolute 10/22/2020 0.38  0.10 - 0.90 10*3/mm3 Final   • Eosinophils, Absolute 10/22/2020 0.17  0.00 - 0.40 10*3/mm3 Final   • Basophils, Absolute 10/22/2020 0.07  0.00 - 0.20 10*3/mm3 Final   • Immature Grans, Absolute 10/22/2020 0.03  0.00 - 0.05 10*3/mm3 Final   • nRBC 10/22/2020 0.0  0.0 - 0.2 /100 WBC Final

## 2021-01-29 ENCOUNTER — HOSPITAL ENCOUNTER (OUTPATIENT)
Dept: GENERAL RADIOLOGY | Facility: HOSPITAL | Age: 76
Discharge: HOME | End: 2021-01-29
Admitting: PHYSICIAN ASSISTANT

## 2021-01-29 ENCOUNTER — OFFICE VISIT (OUTPATIENT)
Dept: FAMILY MEDICINE CLINIC | Facility: CLINIC | Age: 76
End: 2021-01-29

## 2021-01-29 VITALS
OXYGEN SATURATION: 98 % | WEIGHT: 149 LBS | DIASTOLIC BLOOD PRESSURE: 80 MMHG | HEIGHT: 65 IN | TEMPERATURE: 97.1 F | SYSTOLIC BLOOD PRESSURE: 120 MMHG | HEART RATE: 86 BPM | BODY MASS INDEX: 24.83 KG/M2

## 2021-01-29 DIAGNOSIS — R07.81 RIB PAIN ON LEFT SIDE: ICD-10-CM

## 2021-01-29 DIAGNOSIS — R07.81 RIB PAIN ON LEFT SIDE: Primary | ICD-10-CM

## 2021-01-29 PROCEDURE — 71101 X-RAY EXAM UNILAT RIBS/CHEST: CPT

## 2021-01-29 PROCEDURE — 99213 OFFICE O/P EST LOW 20 MIN: CPT | Performed by: PHYSICIAN ASSISTANT

## 2021-01-29 NOTE — PROGRESS NOTES
"Chief Complaint  Rib Pain (under left breast)    Subjective          Codie Munson presents to [facility] for   History of Present Illness    Codie is a 75 year old female who presents with left rib pain for the past 2 weeks.  Describes the pain as a sharp stabbing pain at base of left rib.  She has done a breast exam on  left breast with normal results.  Denied any recent injury or trauma to her chest or rib area.  States when she coughs or takes a deep breath the pain worsens.  Has tried some Tylenol today which helped.  Denied any rashes, fevers, chills, wheezing, or shortness of breath.  Sleep was restless last night due to the pain.  Appetite has been normal.    Objective   Vital Signs:   /80   Pulse 86   Temp 97.1 °F (36.2 °C)   Ht 165.1 cm (65\")   Wt 67.6 kg (149 lb)   SpO2 98%   BMI 24.79 kg/m²     Physical Exam  Vitals signs and nursing note reviewed.   Constitutional:       Appearance: Normal appearance. She is well-developed, well-groomed and normal weight.      Interventions: Face mask in place.   HENT:      Head: Normocephalic and atraumatic.   Neck:      Musculoskeletal: Neck supple.      Trachea: Trachea and phonation normal.   Cardiovascular:      Rate and Rhythm: Normal rate and regular rhythm.      Pulses: Normal pulses.      Heart sounds: Normal heart sounds, S1 normal and S2 normal. No murmur.   Pulmonary:      Effort: Pulmonary effort is normal.      Breath sounds: Normal breath sounds and air entry.   Chest:      Chest wall: Tenderness present. No mass, deformity, swelling or crepitus.      Breasts:         Right: Normal. No swelling, bleeding, inverted nipple, mass, nipple discharge, skin change or tenderness.         Left: Normal. No swelling, bleeding, inverted nipple, mass, nipple discharge, skin change or tenderness.       Abdominal:      General: Bowel sounds are normal.      Palpations: Abdomen is soft. There is no hepatomegaly.      " Tenderness: There is no abdominal tenderness.   Musculoskeletal:      Right lower leg: No edema.      Left lower leg: No edema.   Skin:     General: Skin is warm and dry.      Findings: No rash.   Neurological:      Mental Status: She is alert and oriented to person, place, and time.   Psychiatric:         Attention and Perception: Attention and perception normal.         Mood and Affect: Mood and affect normal.         Speech: Speech normal.         Behavior: Behavior is uncooperative.         Thought Content: Thought content normal.         Cognition and Memory: Cognition and memory normal.         Judgment: Judgment normal.     I was wearing a surgical mask and face shield during the entire office visit/encounter.     Result Review :                 Assessment and Plan    Problem List Items Addressed This Visit     None      Visit Diagnoses     Rib pain on left side    -  Primary    Relevant Orders    XR Ribs Left With PA Chest          Mrs. Codie Munson was seen in office today with new left rib pain.  We will proceed with left rib x-ray series [facility] today for further evaluation.  She will continue using over-the-counter NSAIDs as needed for pain.  Will be notified of test results when completed.    Follow Up   No follow-ups on file.  Patient was given instructions and counseling regarding her condition or for health maintenance advice. Please see specific information pulled into the AVS if appropriate.         [facility]  [address] [facility] [address]  Dept: [phone]  Dept Fax: [phone]  Loc: [phone]  Loc Fax: [phone]

## 2021-04-15 ENCOUNTER — TELEPHONE (OUTPATIENT)
Dept: FAMILY MEDICINE CLINIC | Facility: CLINIC | Age: 76
End: 2021-04-15

## 2021-04-15 DIAGNOSIS — E78.2 MIXED HYPERLIPIDEMIA: Primary | ICD-10-CM

## 2021-04-15 NOTE — TELEPHONE ENCOUNTER
DELETE AFTER REVIEWING: Telephone encounter to be sent to the clinical pool     Caller: Codie Munson    Relationship: Self    Best call back number:     What orders are you requesting (i.e. lab or imaging): LAB    In what timeframe would the patient need to come in: PATIENT HAS AN APPOINTMENT WITH DR LAY ON MAY 5 SO SHE WANTS HER LABS A WEEK BEFORE THAT.      Where will you receive your lab/imaging services: [facility]    Additional notes:

## 2021-04-15 NOTE — TELEPHONE ENCOUNTER
Please notify patient that I have placed the orders in her electronic medical records for [facility] labs.

## 2021-04-26 ENCOUNTER — LAB (OUTPATIENT)
Dept: LAB | Facility: HOSPITAL | Age: 76
End: 2021-04-26

## 2021-04-26 DIAGNOSIS — E78.2 MIXED HYPERLIPIDEMIA: ICD-10-CM

## 2021-04-26 LAB
ALBUMIN SERPL-MCNC: 4.3 G/DL (ref 3.5–5.2)
ALBUMIN/GLOB SERPL: 1.7 G/DL
ALP SERPL-CCNC: 119 U/L (ref 39–117)
ALT SERPL W P-5'-P-CCNC: 20 U/L (ref 1–33)
ANION GAP SERPL CALCULATED.3IONS-SCNC: 10.7 MMOL/L (ref 5–15)
AST SERPL-CCNC: 21 U/L (ref 1–32)
BASOPHILS # BLD AUTO: 0.06 10*3/MM3 (ref 0–0.2)
BASOPHILS NFR BLD AUTO: 0.9 % (ref 0–1.5)
BILIRUB SERPL-MCNC: 0.3 MG/DL (ref 0–1.2)
BUN SERPL-MCNC: 14 MG/DL (ref 8–23)
BUN/CREAT SERPL: 16.9 (ref 7–25)
CALCIUM SPEC-SCNC: 9 MG/DL (ref 8.6–10.5)
CHLORIDE SERPL-SCNC: 104 MMOL/L (ref 98–107)
CHOLEST SERPL-MCNC: 191 MG/DL (ref 0–200)
CO2 SERPL-SCNC: 28.3 MMOL/L (ref 22–29)
CREAT SERPL-MCNC: 0.83 MG/DL (ref 0.57–1)
DEPRECATED RDW RBC AUTO: 40.1 FL (ref 37–54)
EOSINOPHIL # BLD AUTO: 0.19 10*3/MM3 (ref 0–0.4)
EOSINOPHIL NFR BLD AUTO: 2.9 % (ref 0.3–6.2)
ERYTHROCYTE [DISTWIDTH] IN BLOOD BY AUTOMATED COUNT: 13.2 % (ref 12.3–15.4)
GFR SERPL CREATININE-BSD FRML MDRD: 67 ML/MIN/1.73
GLOBULIN UR ELPH-MCNC: 2.5 GM/DL
GLUCOSE SERPL-MCNC: 87 MG/DL (ref 65–99)
HCT VFR BLD AUTO: 40.5 % (ref 34–46.6)
HDLC SERPL-MCNC: 42 MG/DL (ref 40–60)
HGB BLD-MCNC: 13.8 G/DL (ref 12–15.9)
IMM GRANULOCYTES # BLD AUTO: 0.02 10*3/MM3 (ref 0–0.05)
IMM GRANULOCYTES NFR BLD AUTO: 0.3 % (ref 0–0.5)
LDLC SERPL CALC-MCNC: 115 MG/DL (ref 0–100)
LDLC/HDLC SERPL: 2.62 {RATIO}
LYMPHOCYTES # BLD AUTO: 2.17 10*3/MM3 (ref 0.7–3.1)
LYMPHOCYTES NFR BLD AUTO: 33.4 % (ref 19.6–45.3)
MCH RBC QN AUTO: 28.6 PG (ref 26.6–33)
MCHC RBC AUTO-ENTMCNC: 34.1 G/DL (ref 31.5–35.7)
MCV RBC AUTO: 83.9 FL (ref 79–97)
MONOCYTES # BLD AUTO: 0.38 10*3/MM3 (ref 0.1–0.9)
MONOCYTES NFR BLD AUTO: 5.9 % (ref 5–12)
NEUTROPHILS NFR BLD AUTO: 3.67 10*3/MM3 (ref 1.7–7)
NEUTROPHILS NFR BLD AUTO: 56.6 % (ref 42.7–76)
NRBC BLD AUTO-RTO: 0 /100 WBC (ref 0–0.2)
PLATELET # BLD AUTO: 357 10*3/MM3 (ref 140–450)
PMV BLD AUTO: 10 FL (ref 6–12)
POTASSIUM SERPL-SCNC: 4.1 MMOL/L (ref 3.5–5.2)
PROT SERPL-MCNC: 6.8 G/DL (ref 6–8.5)
RBC # BLD AUTO: 4.83 10*6/MM3 (ref 3.77–5.28)
SODIUM SERPL-SCNC: 143 MMOL/L (ref 136–145)
TRIGL SERPL-MCNC: 194 MG/DL (ref 0–150)
VLDLC SERPL-MCNC: 34 MG/DL (ref 5–40)
WBC # BLD AUTO: 6.49 10*3/MM3 (ref 3.4–10.8)

## 2021-04-26 PROCEDURE — 85025 COMPLETE CBC W/AUTO DIFF WBC: CPT | Performed by: PHYSICIAN ASSISTANT

## 2021-04-26 PROCEDURE — 80053 COMPREHEN METABOLIC PANEL: CPT

## 2021-04-26 PROCEDURE — 36415 COLL VENOUS BLD VENIPUNCTURE: CPT

## 2021-04-26 PROCEDURE — 80061 LIPID PANEL: CPT

## 2021-04-30 ENCOUNTER — OFFICE VISIT (OUTPATIENT)
Dept: CARDIOLOGY | Facility: CLINIC | Age: 76
End: 2021-04-30

## 2021-04-30 VITALS
RESPIRATION RATE: 16 BRPM | WEIGHT: 149.6 LBS | SYSTOLIC BLOOD PRESSURE: 120 MMHG | HEART RATE: 106 BPM | BODY MASS INDEX: 24.93 KG/M2 | DIASTOLIC BLOOD PRESSURE: 80 MMHG | HEIGHT: 65 IN | OXYGEN SATURATION: 99 %

## 2021-04-30 DIAGNOSIS — E78.2 MIXED HYPERLIPIDEMIA: Primary | ICD-10-CM

## 2021-04-30 DIAGNOSIS — G43.009 MIGRAINE WITHOUT AURA AND WITHOUT STATUS MIGRAINOSUS, NOT INTRACTABLE: ICD-10-CM

## 2021-04-30 PROCEDURE — 99214 OFFICE O/P EST MOD 30 MIN: CPT | Performed by: INTERNAL MEDICINE

## 2021-04-30 PROCEDURE — 93000 ELECTROCARDIOGRAM COMPLETE: CPT | Performed by: INTERNAL MEDICINE

## 2021-04-30 RX ORDER — ROSUVASTATIN CALCIUM 20 MG/1
30 TABLET, COATED ORAL NIGHTLY
Qty: 135 TABLET | Refills: 3 | Status: SHIPPED | OUTPATIENT
Start: 2021-04-30 | End: 2021-05-05 | Stop reason: SDUPTHER

## 2021-04-30 NOTE — PROGRESS NOTES
"Date of Office Visit: 2021  Encounter Provider: Anamika Lane MD  Place of Service: [facility] CARDIOLOG[facility]  Patient Name: Codie Munson  :1945      Patient ID:  Codie Munson is a 75 y.o. female is here for  followup for hyperlipidemia.         History of Present Illness    I originally saw her for complaints of dizziness, palpitations, and shortness of air in  . at that time she had a stress study which was abnormal with subsequent cardiac  catheterization at [facility] which showed no significant coronary artery disease.   Her left ventricular systolic function was normal. She however had an echocardiogram done  at the same time which showed an ejection fraction of 40%. Because of her lightheadedness  and dizziness, she had carotid duplex studies which showed minimal disease of the left  carotid artery. She then had repeat carotid duplex study performed on 2010  which showed no carotid artery disease.      She had a bladder resuspension with Dr. Harish Arellano.  She also has a pessary which was placed by Dr. Navarrete.     she has reflux of the lesser and greater saphenous veins. She sees Dr. Perrin for venous incompetence now and had vein stripping on the left.  She is wearing compression stockings her leg feels better.  Carotids with Dr. Perrin did show some mild atherosclerosis without stenosis.     Her echocardiogram which was done 2016, and this showed ejection fraction of 60% with grade I diastolic dysfunction, normal segmental wall motion and no significant valve disease.      She had a normal stress nuclear study and vascular screening done 2017.       2021 show normal CMP, total cholesterol 91, HDL 42, , VLDL 34, triglycerides 194, normal CBC.  She has had an electrical \"flashing\" sensation in her brain that causes her to almost stuttering her movements.  She has never been diagnosed with migraine headaches before " but there is no suggestion by physician at one point that she might happen.  She is concerned is basically getting worse.  She does not feel her heart racing or skipping.  She has had no dizziness or syncope.  Her energy level is able.  She has no orthopnea or PND.  She is taking her medications as directed.  Her triglycerides have gone up.  She does eat potatoes every day.    Past Medical History:   Diagnosis Date   • Cardiomyopathy (CMS/HCC)    • Chronic cystitis    • Cough    • Depression    • GERD (gastroesophageal reflux disease)    • Hyperlipidemia    • Hypertension    • Scarlet fever    • Strain of thoracic region    • Urethral prolapse    • URI (upper respiratory infection)    • Urinary retention          Past Surgical History:   Procedure Laterality Date   • APPENDECTOMY     • BLADDER SURGERY     • CARDIAC CATHETERIZATION      Normal. Performed at [facility].   •  SECTION     • VEIN LIGATION AND STRIPPING         Current Outpatient Medications on File Prior to Visit   Medication Sig Dispense Refill   • aspirin 81 MG chewable tablet Chew 81 mg 3 (Three) Times a Week.     • Cholecalciferol (VITAMIN D3) 2000 units tablet Take  by mouth.     • conjugated estrogens (PREMARIN) 0.625 MG/GM vaginal cream USE A PEA-SIZED AMOUNT ON FINGERTIP AND PLACE IN THE VAGINAL OPENING 3 X PER WEEK.     • esomeprazole (NexIUM) 40 MG capsule Take 40 mg by mouth every morning before breakfast.     • fluticasone (FLONASE) 50 MCG/ACT nasal spray 2 sprays into the nostril(s) as directed by provider As Needed.     • [DISCONTINUED] rosuvastatin (CRESTOR) 20 MG tablet Take 1 tablet by mouth Every Night. 90 tablet 3     No current facility-administered medications on file prior to visit.       Social History     Socioeconomic History   • Marital status:      Spouse name: Not on file   • Number of children: Not on file   • Years of education: Not on file   • Highest education level: Not on file   Tobacco Use  "  • Smoking status: Never Smoker   • Smokeless tobacco: Never Used   Vaping Use   • Vaping Use: Never used   Substance and Sexual Activity   • Alcohol use: No   • Drug use: Defer   • Sexual activity: Defer           ROS    Procedures    ECG 12 Lead    Date/Time: 4/30/2021 2:54 PM  Performed by: Anamika Lane MD  Authorized by: Anamika Lane MD   Comparison: compared with previous ECG   Similar to previous ECG  Rhythm: sinus tachycardia    Clinical impression: normal ECG                Objective:      Vitals:    04/30/21 1434   BP: 120/80   BP Location: Right arm   Patient Position: Sitting   Cuff Size: Adult   Pulse: 106   Resp: 16   SpO2: 99%   Weight: 67.9 kg (149 lb 9.6 oz)   Height: 165.1 cm (65\")     Body mass index is 24.89 kg/m².    Vitals reviewed.   Constitutional:       General: Not in acute distress.     Appearance: Well-developed. Not diaphoretic.   Eyes:      General: No scleral icterus.     Conjunctiva/sclera: Conjunctivae normal.   HENT:      Head: Normocephalic and atraumatic.   Neck:      Thyroid: No thyromegaly.      Vascular: No carotid bruit or JVD.      Lymphadenopathy: No cervical adenopathy.   Pulmonary:      Effort: Pulmonary effort is normal. No respiratory distress.      Breath sounds: Normal breath sounds. No wheezing. No rhonchi. No rales.   Chest:      Chest wall: Not tender to palpatation.   Cardiovascular:      Normal rate. Regular rhythm.      Murmurs: There is no murmur.      No gallop.   Pulses:     Intact distal pulses.   Edema:     Peripheral edema absent.   Abdominal:      General: Bowel sounds are normal. There is no distension or abdominal bruit.      Palpations: Abdomen is soft. There is no abdominal mass.      Tenderness: There is no abdominal tenderness.   Musculoskeletal:         General: No deformity.      Extremities: No clubbing present.     Cervical back: Neck supple. Skin:     General: Skin is warm and dry. There is no cyanosis.      Coloration: Skin is " not pale.      Findings: No rash.   Neurological:      Mental Status: Alert and oriented to person, place, and time.      Cranial Nerves: No cranial nerve deficit.   Psychiatric:         Judgment: Judgment normal.         Lab Review:       Assessment:      Diagnosis Plan   1. Mixed hyperlipidemia  rosuvastatin (CRESTOR) 20 MG tablet   2. Migraine without aura and without status migrainosus, not intractable  Ambulatory Referral to Neurology       1. Hyperlipidemia, on Crestor, triglycerides are rising, advised avoiding carbs and increase Crestor to milligrams nightly.  2. Hypertension.  Stable on no meds  3. Normal renal arteriogram in 2002.   4. Normal cardiac catheterization in 2005.   5. Venous varicosities, s/p stripping on left 6/2019. Sees Dr. Perrin.  6. Numbness of her third toe on her right foot. Stable.   7. Recent bladder resuspension with Dr. Harish Arellano.  8. Allergies, getting allergy shots, sees Dr. Landeros.  9.  Past migraine headaches, refer to Dr. Felix.          Plan:       Advised exercise, increase Crestor and see back in 1 year.

## 2021-05-03 ENCOUNTER — OFFICE VISIT (OUTPATIENT)
Dept: FAMILY MEDICINE CLINIC | Facility: CLINIC | Age: 76
End: 2021-05-03

## 2021-05-03 VITALS
OXYGEN SATURATION: 97 % | TEMPERATURE: 97.7 F | BODY MASS INDEX: 25.16 KG/M2 | DIASTOLIC BLOOD PRESSURE: 80 MMHG | HEART RATE: 94 BPM | SYSTOLIC BLOOD PRESSURE: 128 MMHG | WEIGHT: 151 LBS | HEIGHT: 65 IN

## 2021-05-03 DIAGNOSIS — I42.8 OTHER CARDIOMYOPATHY (HCC): ICD-10-CM

## 2021-05-03 DIAGNOSIS — E78.2 MIXED HYPERLIPIDEMIA: Primary | ICD-10-CM

## 2021-05-03 PROCEDURE — 99213 OFFICE O/P EST LOW 20 MIN: CPT | Performed by: PHYSICIAN ASSISTANT

## 2021-05-03 NOTE — PROGRESS NOTES
"Chief Complaint  Hyperlipidemia    Subjective          Codie Munson presents to [facility]  History of Present Illness  Codie  is a 75-year-old female who presents for hyperlipidemia management.  States she recently saw her cardiologist, Dr. Anamika Lane.  States Dr. Lane reviewed her lab work and increased her Crestor to 30 mg daily.  Has not picked up the new prescription.  Saw Dr. Lane on Friday, April 30, 2021.  Overall she is feeling well.  Her diet has been not as healthy.  Tends to eat more potatoes and sweets.  States her  always needs something sweet to eat.  She has gained 2 pounds since April 30, 2021 office visit with cardiology.  She has not been exercising due to the winter months and weather.  States cardiologist wants her to walk 30 minutes daily.  She has been trying to increase water intake.  Currently drinks about 36 ounces of water daily.  Caffeine intake is approximately 24 ounces daily.  Bowel movements have been daily without dark black tarry stools.  She does not use a lot of salt in her food.  Denied any chest pain, shortness of air, dizziness, vision changes, abdominal pain or swelling of ankles.     Objective   Vital Signs:   /80   Pulse 94   Temp 97.7 °F (36.5 °C)   Ht 165.1 cm (65\")   Wt 68.5 kg (151 lb)   SpO2 97%   BMI 25.13 kg/m²     Physical Exam  Vitals and nursing note reviewed.   Constitutional:       Appearance: Normal appearance. She is well-developed, well-groomed and normal weight.      Interventions: Face mask in place.   HENT:      Head: Normocephalic and atraumatic.   Neck:      Thyroid: No thyroid mass, thyromegaly or thyroid tenderness.      Vascular: No carotid bruit.      Trachea: Trachea and phonation normal. No tracheal tenderness.   Cardiovascular:      Rate and Rhythm: Normal rate and regular rhythm.      Pulses: Normal pulses.      Heart sounds: Normal heart sounds, S1 normal and S2 normal. No " murmur heard.     Pulmonary:      Effort: Pulmonary effort is normal.      Breath sounds: Normal breath sounds and air entry.   Abdominal:      General: Bowel sounds are normal.      Palpations: Abdomen is soft. There is no hepatomegaly.      Tenderness: There is no abdominal tenderness.   Musculoskeletal:      Cervical back: Neck supple.      Right lower leg: No edema.      Left lower leg: No edema.   Skin:     General: Skin is warm and dry.      Capillary Refill: Capillary refill takes less than 2 seconds.   Neurological:      Mental Status: She is alert and oriented to person, place, and time.   Psychiatric:         Attention and Perception: Attention and perception normal.         Mood and Affect: Mood and affect normal.         Speech: Speech normal.         Behavior: Behavior normal. Behavior is cooperative.         Thought Content: Thought content normal.         Cognition and Memory: Cognition and memory normal.         Judgment: Judgment normal.     I was wearing a surgical mask and face shield during the entire office visit/encounter.     Result Review :     Common labs    Common Labsle 7/1/20 7/1/20 7/1/20 10/22/20 10/22/20 10/22/20 4/26/21 4/26/21 4/26/21    1044 1044 1044 0603 0603 0603 0640 0640 0640   Glucose      89   87   Glucose 81           BUN 15     11   14   Creatinine 0.86     0.91   0.83   eGFR Non  Am 64     60 (A)   67   eGFR African Am 78           Sodium 142     143   143   Potassium 4.3     4.7   4.1   Chloride 103     104   104   Calcium 9.1     9.1   9.0   Total Protein 6.8           Albumin 4.40     4.30   4.30   Total Bilirubin 0.5     0.3   0.3   Alkaline Phosphatase 102     114   119 (A)   AST (SGOT) 16     20   21   ALT (SGPT) 15     18   20   WBC  6.22  6.23   6.49     Hemoglobin  13.1  13.5   13.8     Hematocrit  39.7  40.5   40.5     Platelets  344  282   357     Total Cholesterol     160   191    Total Cholesterol   166         Triglycerides   178 (A)  177 (A)   194 (A)     HDL Cholesterol   47  48   42    LDL Cholesterol    83  82   115 (A)    (A) Abnormal value       Comments are available for some flowsheets but are not being displayed.                     Assessment and Plan    Diagnoses and all orders for this visit:    1. Mixed hyperlipidemia (Primary)    2. Other cardiomyopathy (CMS/HCC)    1.  Chronic and stable hyperlipidemia: I have reviewed above blood work with her at office visit today.  She recently saw her cardiologist, Dr. Lane, who increased her Crestor to 30 mg daily.  Stressed the importance of decreasing her sugar, carbohydrate and potato intake.  I will recheck fasting lab work in 6 months.  Stressed the importance of exercising as well.  2.  Chronic and stable cardiomyopathy: Has been seeing cardiologist, Dr. Anamika Lane for this.  She will keep her follow-up appointment.      Follow Up   Return in about 6 months (around 11/3/2021), or labs before, for Medicare Wellness.  Patient was given instructions and counseling regarding her condition or for health maintenance advice. Please see specific information pulled into the AVS if appropriate.     [facility]  [address] [place] [id number]  Dept: [phone]  Dept Fax: [phone]  Loc: [phone]  Loc Fax: [phone]

## 2021-05-05 DIAGNOSIS — E78.2 MIXED HYPERLIPIDEMIA: ICD-10-CM

## 2021-05-05 RX ORDER — ROSUVASTATIN CALCIUM 20 MG/1
30 TABLET, COATED ORAL NIGHTLY
Qty: 135 TABLET | Refills: 3 | Status: SHIPPED | OUTPATIENT
Start: 2021-05-05 | End: 2021-05-14 | Stop reason: SDUPTHER

## 2021-05-14 DIAGNOSIS — E78.2 MIXED HYPERLIPIDEMIA: ICD-10-CM

## 2021-05-14 RX ORDER — ROSUVASTATIN CALCIUM 20 MG/1
20 TABLET, COATED ORAL NIGHTLY
Qty: 90 TABLET | Refills: 3 | Status: SHIPPED | OUTPATIENT
Start: 2021-05-14 | End: 2021-05-14

## 2021-05-14 RX ORDER — ROSUVASTATIN CALCIUM 10 MG/1
10 TABLET, COATED ORAL DAILY
Qty: 90 TABLET | Refills: 3 | OUTPATIENT
Start: 2021-05-14

## 2021-05-14 RX ORDER — ROSUVASTATIN CALCIUM 10 MG/1
10 TABLET, COATED ORAL DAILY
Qty: 90 TABLET | Refills: 3 | Status: SHIPPED | OUTPATIENT
Start: 2021-05-14 | End: 2021-12-22

## 2021-05-14 NOTE — TELEPHONE ENCOUNTER
Pt is currently on Crestor 30 mg  She states that she can not cut the 20's in half so she would like a separate rx for the 10 mg

## 2021-07-12 ENCOUNTER — TRANSCRIBE ORDERS (OUTPATIENT)
Dept: ADMINISTRATIVE | Facility: HOSPITAL | Age: 76
End: 2021-07-12

## 2021-07-12 DIAGNOSIS — Z12.31 SCREENING MAMMOGRAM, ENCOUNTER FOR: Primary | ICD-10-CM

## 2021-08-03 ENCOUNTER — HOSPITAL ENCOUNTER (OUTPATIENT)
Dept: MAMMOGRAPHY | Facility: HOSPITAL | Age: 76
Discharge: HOME | End: 2021-08-03
Admitting: PHYSICIAN ASSISTANT

## 2021-08-03 ENCOUNTER — APPOINTMENT (OUTPATIENT)
Dept: MAMMOGRAPHY | Facility: HOSPITAL | Age: 76
End: 2021-08-03

## 2021-08-03 DIAGNOSIS — Z12.31 SCREENING MAMMOGRAM, ENCOUNTER FOR: ICD-10-CM

## 2021-08-03 PROCEDURE — 77067 SCR MAMMO BI INCL CAD: CPT

## 2021-08-03 PROCEDURE — 77063 BREAST TOMOSYNTHESIS BI: CPT

## 2021-10-14 ENCOUNTER — OFFICE VISIT (OUTPATIENT)
Dept: FAMILY MEDICINE CLINIC | Facility: CLINIC | Age: 76
End: 2021-10-14

## 2021-10-14 ENCOUNTER — HOSPITAL ENCOUNTER (OUTPATIENT)
Dept: CT IMAGING | Facility: HOSPITAL | Age: 76
Discharge: HOME | End: 2021-10-14
Admitting: PHYSICIAN ASSISTANT

## 2021-10-14 VITALS
TEMPERATURE: 97.7 F | HEIGHT: 65 IN | BODY MASS INDEX: 24.49 KG/M2 | SYSTOLIC BLOOD PRESSURE: 130 MMHG | DIASTOLIC BLOOD PRESSURE: 90 MMHG | WEIGHT: 147 LBS | OXYGEN SATURATION: 97 % | HEART RATE: 103 BPM

## 2021-10-14 DIAGNOSIS — R10.32 LLQ PAIN: Primary | ICD-10-CM

## 2021-10-14 DIAGNOSIS — N30.00 ACUTE CYSTITIS WITHOUT HEMATURIA: ICD-10-CM

## 2021-10-14 DIAGNOSIS — R11.0 NAUSEA: ICD-10-CM

## 2021-10-14 DIAGNOSIS — R10.32 LLQ PAIN: ICD-10-CM

## 2021-10-14 LAB
BILIRUB BLD-MCNC: NEGATIVE MG/DL
CLARITY, POC: CLEAR
COLOR UR: YELLOW
CREAT BLDA-MCNC: 0.9 MG/DL (ref 0.6–1.3)
GLUCOSE UR STRIP-MCNC: NEGATIVE MG/DL
KETONES UR QL: ABNORMAL
LEUKOCYTE EST, POC: ABNORMAL
NITRITE UR-MCNC: POSITIVE MG/ML
PH UR: 5 [PH] (ref 5–8)
PROT UR STRIP-MCNC: NEGATIVE MG/DL
RBC # UR STRIP: ABNORMAL /UL
SP GR UR: 1.01 (ref 1–1.03)
UROBILINOGEN UR QL: NORMAL

## 2021-10-14 PROCEDURE — 81002 URINALYSIS NONAUTO W/O SCOPE: CPT | Performed by: PHYSICIAN ASSISTANT

## 2021-10-14 PROCEDURE — 99214 OFFICE O/P EST MOD 30 MIN: CPT | Performed by: PHYSICIAN ASSISTANT

## 2021-10-14 PROCEDURE — 82565 ASSAY OF CREATININE: CPT

## 2021-10-14 PROCEDURE — 0 IOPAMIDOL PER 1 ML: Performed by: PHYSICIAN ASSISTANT

## 2021-10-14 PROCEDURE — 74177 CT ABD & PELVIS W/CONTRAST: CPT

## 2021-10-14 RX ORDER — CIPROFLOXACIN 500 MG/1
500 TABLET, FILM COATED ORAL 2 TIMES DAILY
Qty: 14 TABLET | Refills: 0 | Status: SHIPPED | OUTPATIENT
Start: 2021-10-14 | End: 2021-11-03

## 2021-10-14 RX ORDER — ROSUVASTATIN CALCIUM 20 MG/1
20 TABLET, COATED ORAL DAILY
COMMUNITY
End: 2022-06-13

## 2021-10-14 RX ORDER — METRONIDAZOLE 500 MG/1
500 TABLET ORAL 3 TIMES DAILY
Qty: 21 TABLET | Refills: 0 | Status: SHIPPED | OUTPATIENT
Start: 2021-10-14 | End: 2021-11-03

## 2021-10-14 RX ADMIN — IOPAMIDOL 100 ML: 755 INJECTION, SOLUTION INTRAVENOUS at 16:10

## 2021-10-14 NOTE — PROGRESS NOTES
"Chief Complaint  Abdominal Pain (lower left mainly x 2 weeks)    Subjective          Codie Munson presents to [facility]  History of Present Illness  Toi is a 76-year-old female who presents with left lower abdominal pain for the past 2 weeks.  Codie states her symptoms started 2 weeks ago when she started eating plums.  She was able to get improvement after eating the palms.  Started eating raw carrots and cauliflower the last few days.  That is when her pain worsened.  Has a history of diverticulosis.  Describes the pain as a sharp pain in left lower quadrant.  She has had some nausea but denied any fevers, chills, urinary symptoms, vomiting or diarrhea.  Bowel movements have been daily without dark black tarry stools.     Objective   Vital Signs:   /90   Pulse 103   Temp 97.7 °F (36.5 °C)   Ht 165.1 cm (65\")   Wt 66.7 kg (147 lb)   SpO2 97%   BMI 24.46 kg/m²     Physical Exam  Vitals and nursing note reviewed.   Constitutional:       Appearance: Normal appearance. She is well-developed, well-groomed and normal weight.      Interventions: Face mask in place.   HENT:      Head: Normocephalic and atraumatic.   Neck:      Trachea: Trachea and phonation normal. No tracheal tenderness.   Cardiovascular:      Rate and Rhythm: Normal rate and regular rhythm.      Pulses: Normal pulses.      Heart sounds: Normal heart sounds, S1 normal and S2 normal. No murmur heard.      Pulmonary:      Effort: Pulmonary effort is normal.      Breath sounds: Normal breath sounds and air entry.   Abdominal:      General: Bowel sounds are normal.      Palpations: Abdomen is soft. There is no hepatomegaly.      Tenderness: There is abdominal tenderness in the left lower quadrant. There is no right CVA tenderness, left CVA tenderness, guarding or rebound. Negative signs include Martin's sign, Rovsing's sign, McBurney's sign, psoas sign and obturator sign.       Musculoskeletal:      " Cervical back: Neck supple.      Right lower leg: No edema.      Left lower leg: No edema.   Skin:     General: Skin is warm and dry.      Capillary Refill: Capillary refill takes less than 2 seconds.   Neurological:      Mental Status: She is alert and oriented to person, place, and time.   Psychiatric:         Attention and Perception: Attention and perception normal.         Mood and Affect: Mood and affect normal.         Speech: Speech normal.         Behavior: Behavior normal. Behavior is cooperative.         Thought Content: Thought content normal.         Cognition and Memory: Cognition and memory normal.         Judgment: Judgment normal.     I was wearing a surgical mask and face shield during the entire office visit/encounter.     Result Review :          Results for orders placed or performed in visit on 10/14/21   POC Urinalysis Dipstick    Specimen: Urine   Result Value Ref Range    Color Yellow Yellow, Straw, Dark Yellow, Malena    Clarity, UA Clear Clear    Glucose, UA Negative Negative, 1000 mg/dL (3+) mg/dL    Bilirubin Negative Negative    Ketones, UA 1+ (A) Negative    Specific Gravity  1.015 1.005 - 1.030    Blood, UA Moderate (A) Negative    pH, Urine 5.0 5.0 - 8.0    Protein, POC Negative Negative mg/dL    Urobilinogen, UA Normal Normal    Leukocytes Moderate (2+) (A) Negative    Nitrite, UA Positive (A) Negative                 Assessment and Plan    Diagnoses and all orders for this visit:    1. LLQ pain (Primary)  -     POC Urinalysis Dipstick  -     CT Abdomen Pelvis With Contrast; Future  -     Urine Culture - Urine, Urine, Clean Catch  -     ciprofloxacin (Cipro) 500 MG tablet; Take 1 tablet by mouth 2 (Two) Times a Day.  Dispense: 14 tablet; Refill: 0  -     metroNIDAZOLE (Flagyl) 500 MG tablet; Take 1 tablet by mouth 3 (Three) Times a Day.  Dispense: 21 tablet; Refill: 0    2. Nausea  -     POC Urinalysis Dipstick  -     CT Abdomen Pelvis With Contrast; Future  -     Urine Culture -  Urine, Urine, Clean Catch  -     ciprofloxacin (Cipro) 500 MG tablet; Take 1 tablet by mouth 2 (Two) Times a Day.  Dispense: 14 tablet; Refill: 0  -     metroNIDAZOLE (Flagyl) 500 MG tablet; Take 1 tablet by mouth 3 (Three) Times a Day.  Dispense: 21 tablet; Refill: 0    3. Acute cystitis without hematuria  -     Urine Culture - Urine, Urine, Clean Catch  -     ciprofloxacin (Cipro) 500 MG tablet; Take 1 tablet by mouth 2 (Two) Times a Day.  Dispense: 14 tablet; Refill: 0    1.  New left lower quadrant pain with nausea: We will proceed with stat abdomen and pelvis CT scan to rule out diverticulitis.  She will be notified of test results when completed.  She will do a liquid diet until test results are completed.  Will send to pharmacy Cipro and Flagyl antibiotics for possible diverticulitis.  Instructed if symptoms worsen she will go through the emergency room.  2.  New acute cystitis: In office urinalysis was positive for white cells and bacteria.  Diagnosed with UTI and placed on Cipro antibiotic.  Have sent urine culture to the laboratory for further evaluation.  Will be notified of test results when completed.    Follow Up   No follow-ups on file.  Patient was given instructions and counseling regarding her condition or for health maintenance advice. Please see specific information pulled into the AVS if appropriate.     [facility]  [address] [place] [id number]  Dept: [phone]  Dept Fax: [phone]  Loc: [phone]  Loc Fax: [phone]

## 2021-10-20 LAB
BACTERIA UR CULT: ABNORMAL
BACTERIA UR CULT: ABNORMAL
OTHER ANTIBIOTIC SUSC ISLT: ABNORMAL

## 2021-10-21 DIAGNOSIS — Z00.00 MEDICARE ANNUAL WELLNESS VISIT, SUBSEQUENT: ICD-10-CM

## 2021-10-21 DIAGNOSIS — E78.2 MIXED HYPERLIPIDEMIA: Primary | ICD-10-CM

## 2021-10-27 ENCOUNTER — LAB (OUTPATIENT)
Dept: LAB | Facility: HOSPITAL | Age: 76
End: 2021-10-27

## 2021-10-27 DIAGNOSIS — Z00.00 MEDICARE ANNUAL WELLNESS VISIT, SUBSEQUENT: ICD-10-CM

## 2021-10-27 DIAGNOSIS — E78.2 MIXED HYPERLIPIDEMIA: ICD-10-CM

## 2021-10-27 LAB
ALBUMIN SERPL-MCNC: 4.4 G/DL (ref 3.5–5.2)
ALBUMIN/GLOB SERPL: 1.8 G/DL
ALP SERPL-CCNC: 94 U/L (ref 39–117)
ALT SERPL W P-5'-P-CCNC: 29 U/L (ref 1–33)
ANION GAP SERPL CALCULATED.3IONS-SCNC: 9.6 MMOL/L (ref 5–15)
AST SERPL-CCNC: 36 U/L (ref 1–32)
BASOPHILS # BLD AUTO: 0.08 10*3/MM3 (ref 0–0.2)
BASOPHILS NFR BLD AUTO: 1.3 % (ref 0–1.5)
BILIRUB SERPL-MCNC: 0.3 MG/DL (ref 0–1.2)
BUN SERPL-MCNC: 10 MG/DL (ref 8–23)
BUN/CREAT SERPL: 10.9 (ref 7–25)
CALCIUM SPEC-SCNC: 8.8 MG/DL (ref 8.6–10.5)
CHLORIDE SERPL-SCNC: 104 MMOL/L (ref 98–107)
CHOLEST SERPL-MCNC: 180 MG/DL (ref 0–200)
CO2 SERPL-SCNC: 27.4 MMOL/L (ref 22–29)
CREAT SERPL-MCNC: 0.92 MG/DL (ref 0.57–1)
DEPRECATED RDW RBC AUTO: 43.2 FL (ref 37–54)
EOSINOPHIL # BLD AUTO: 0.24 10*3/MM3 (ref 0–0.4)
EOSINOPHIL NFR BLD AUTO: 3.8 % (ref 0.3–6.2)
ERYTHROCYTE [DISTWIDTH] IN BLOOD BY AUTOMATED COUNT: 13.5 % (ref 12.3–15.4)
GFR SERPL CREATININE-BSD FRML MDRD: 59 ML/MIN/1.73
GLOBULIN UR ELPH-MCNC: 2.5 GM/DL
GLUCOSE SERPL-MCNC: 91 MG/DL (ref 65–99)
HCT VFR BLD AUTO: 41.7 % (ref 34–46.6)
HDLC SERPL-MCNC: 48 MG/DL (ref 40–60)
HGB BLD-MCNC: 13.1 G/DL (ref 12–15.9)
IMM GRANULOCYTES # BLD AUTO: 0.02 10*3/MM3 (ref 0–0.05)
IMM GRANULOCYTES NFR BLD AUTO: 0.3 % (ref 0–0.5)
LDLC SERPL CALC-MCNC: 103 MG/DL (ref 0–100)
LDLC/HDLC SERPL: 2.07 {RATIO}
LYMPHOCYTES # BLD AUTO: 2.26 10*3/MM3 (ref 0.7–3.1)
LYMPHOCYTES NFR BLD AUTO: 35.6 % (ref 19.6–45.3)
MCH RBC QN AUTO: 27.5 PG (ref 26.6–33)
MCHC RBC AUTO-ENTMCNC: 31.4 G/DL (ref 31.5–35.7)
MCV RBC AUTO: 87.4 FL (ref 79–97)
MONOCYTES # BLD AUTO: 0.41 10*3/MM3 (ref 0.1–0.9)
MONOCYTES NFR BLD AUTO: 6.5 % (ref 5–12)
NEUTROPHILS NFR BLD AUTO: 3.34 10*3/MM3 (ref 1.7–7)
NEUTROPHILS NFR BLD AUTO: 52.5 % (ref 42.7–76)
NRBC BLD AUTO-RTO: 0 /100 WBC (ref 0–0.2)
PLATELET # BLD AUTO: 387 10*3/MM3 (ref 140–450)
PMV BLD AUTO: 10 FL (ref 6–12)
POTASSIUM SERPL-SCNC: 4.7 MMOL/L (ref 3.5–5.2)
PROT SERPL-MCNC: 6.9 G/DL (ref 6–8.5)
RBC # BLD AUTO: 4.77 10*6/MM3 (ref 3.77–5.28)
SODIUM SERPL-SCNC: 141 MMOL/L (ref 136–145)
TRIGL SERPL-MCNC: 164 MG/DL (ref 0–150)
TSH SERPL DL<=0.05 MIU/L-ACNC: 4.47 UIU/ML (ref 0.27–4.2)
VLDLC SERPL-MCNC: 29 MG/DL (ref 5–40)
WBC # BLD AUTO: 6.35 10*3/MM3 (ref 3.4–10.8)

## 2021-10-27 PROCEDURE — 80053 COMPREHEN METABOLIC PANEL: CPT

## 2021-10-27 PROCEDURE — 36415 COLL VENOUS BLD VENIPUNCTURE: CPT

## 2021-10-27 PROCEDURE — 84443 ASSAY THYROID STIM HORMONE: CPT

## 2021-10-27 PROCEDURE — 85025 COMPLETE CBC W/AUTO DIFF WBC: CPT

## 2021-10-27 PROCEDURE — 80061 LIPID PANEL: CPT

## 2021-11-03 ENCOUNTER — OFFICE VISIT (OUTPATIENT)
Dept: FAMILY MEDICINE CLINIC | Facility: CLINIC | Age: 76
End: 2021-11-03

## 2021-11-03 VITALS
SYSTOLIC BLOOD PRESSURE: 114 MMHG | DIASTOLIC BLOOD PRESSURE: 70 MMHG | OXYGEN SATURATION: 98 % | WEIGHT: 149 LBS | TEMPERATURE: 97.3 F | RESPIRATION RATE: 13 BRPM | HEART RATE: 95 BPM | BODY MASS INDEX: 24.83 KG/M2 | HEIGHT: 65 IN

## 2021-11-03 DIAGNOSIS — E78.2 MIXED HYPERLIPIDEMIA: ICD-10-CM

## 2021-11-03 DIAGNOSIS — K21.9 GASTRO-ESOPHAGEAL REFLUX DISEASE WITHOUT ESOPHAGITIS: ICD-10-CM

## 2021-11-03 DIAGNOSIS — E03.9 ACQUIRED HYPOTHYROIDISM: ICD-10-CM

## 2021-11-03 DIAGNOSIS — Z78.0 MENOPAUSE: ICD-10-CM

## 2021-11-03 DIAGNOSIS — Z00.00 MEDICARE ANNUAL WELLNESS VISIT, SUBSEQUENT: Primary | ICD-10-CM

## 2021-11-03 PROCEDURE — 1126F AMNT PAIN NOTED NONE PRSNT: CPT | Performed by: PHYSICIAN ASSISTANT

## 2021-11-03 PROCEDURE — G0439 PPPS, SUBSEQ VISIT: HCPCS | Performed by: PHYSICIAN ASSISTANT

## 2021-11-03 PROCEDURE — 1160F RVW MEDS BY RX/DR IN RCRD: CPT | Performed by: PHYSICIAN ASSISTANT

## 2021-11-03 PROCEDURE — 1170F FXNL STATUS ASSESSED: CPT | Performed by: PHYSICIAN ASSISTANT

## 2021-11-03 RX ORDER — LEVOTHYROXINE SODIUM 0.05 MG/1
50 TABLET ORAL
Qty: 30 TABLET | Refills: 3 | Status: SHIPPED | OUTPATIENT
Start: 2021-11-03 | End: 2021-12-22

## 2021-11-03 NOTE — PROGRESS NOTES
The ABCs of the Annual Wellness Visit  Subsequent Medicare Wellness Visit    Chief Complaint   Patient presents with   • Medicare Wellness-subsequent      Subjective    History of Present Illness:  Codie Munson is a 76 y.o. female who presents for a Subsequent Medicare Wellness Visit, GERD and hyperlipidemia management.  States she had the flu shot last month at her local pharmacy.  Overall she is feeling well.  She has had some hair loss over the past several months.  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  States this has been kind of stressful for her at times.  Diet has been slightly healthy.  She does not exercise regularly.  Codie denied any chest pain, shortness of air, dizziness, vision changes, cough, wheezing, abdominal pain or swelling of ankles.  Bowel movements have been normal without dark black tarry stools.  Has been compliant with her medications.  Has no complaints today.    The following portions of the patient's history were reviewed and   updated as appropriate:   She  has a past medical history of Cardiomyopathy (HCC), Chronic cystitis, Cough, Depression, GERD (gastroesophageal reflux disease), Hyperlipidemia, Hypertension, Scarlet fever, Strain of thoracic region, Urethral prolapse, URI (upper respiratory infection), and Urinary retention.  She does not have any pertinent problems on file.  She  has a past surgical history that includes Appendectomy;  section; Bladder surgery; Cardiac catheterization; and Vein ligation and stripping ().  Her family history includes Aneurysm in her father; Cancer in her brother and mother; Crohn's disease in her sister; Heart disease in her father; Heart failure in her mother; Kidney failure in her mother; Prostate cancer in her brother; Stroke in her father.  She  reports that she has never smoked. She has never used smokeless tobacco. Drug use questions deferred to the physician. She reports that she  does not drink alcohol.  Current Outpatient Medications   Medication Sig Dispense Refill   • aspirin 81 MG chewable tablet Chew 81 mg 3 (Three) Times a Week.     • Cholecalciferol (VITAMIN D3) 2000 units tablet Take  by mouth.     • conjugated estrogens (PREMARIN) 0.625 MG/GM vaginal cream USE A PEA-SIZED AMOUNT ON FINGERTIP AND PLACE IN THE VAGINAL OPENING 3 X PER WEEK.     • esomeprazole (NexIUM) 40 MG capsule Take 40 mg by mouth every morning before breakfast.     • fluticasone (FLONASE) 50 MCG/ACT nasal spray 2 sprays into the nostril(s) as directed by provider As Needed.     • rosuvastatin (CRESTOR) 10 MG tablet Take 1 tablet by mouth Daily. 90 tablet 3   • rosuvastatin (CRESTOR) 20 MG tablet Take 20 mg by mouth Daily.     • levothyroxine (Synthroid) 50 MCG tablet Take 1 tablet by mouth Every Morning. 30 tablet 3     No current facility-administered medications for this visit.     Current Outpatient Medications on File Prior to Visit   Medication Sig   • aspirin 81 MG chewable tablet Chew 81 mg 3 (Three) Times a Week.   • Cholecalciferol (VITAMIN D3) 2000 units tablet Take  by mouth.   • conjugated estrogens (PREMARIN) 0.625 MG/GM vaginal cream USE A PEA-SIZED AMOUNT ON FINGERTIP AND PLACE IN THE VAGINAL OPENING 3 X PER WEEK.   • esomeprazole (NexIUM) 40 MG capsule Take 40 mg by mouth every morning before breakfast.   • fluticasone (FLONASE) 50 MCG/ACT nasal spray 2 sprays into the nostril(s) as directed by provider As Needed.   • rosuvastatin (CRESTOR) 10 MG tablet Take 1 tablet by mouth Daily.   • rosuvastatin (CRESTOR) 20 MG tablet Take 20 mg by mouth Daily.   • [DISCONTINUED] ciprofloxacin (Cipro) 500 MG tablet Take 1 tablet by mouth 2 (Two) Times a Day.   • [DISCONTINUED] metroNIDAZOLE (Flagyl) 500 MG tablet Take 1 tablet by mouth 3 (Three) Times a Day.     No current facility-administered medications on file prior to visit.     She is allergic to augmentin [amoxicillin-pot clavulanate] and biaxin  [clarithromycin]..    Compared to one year ago, the patient feels her physical   health is the same.    Compared to one year ago, the patient feels her mental   health is the same.    Recent Hospitalizations:  She was not admitted to the hospital during the last year.       Current Medical Providers:  Patient Care Team:  Sri Leach PA-C as PCP - General (Family Medicine)    Outpatient Medications Prior to Visit   Medication Sig Dispense Refill   • aspirin 81 MG chewable tablet Chew 81 mg 3 (Three) Times a Week.     • Cholecalciferol (VITAMIN D3) 2000 units tablet Take  by mouth.     • conjugated estrogens (PREMARIN) 0.625 MG/GM vaginal cream USE A PEA-SIZED AMOUNT ON FINGERTIP AND PLACE IN THE VAGINAL OPENING 3 X PER WEEK.     • esomeprazole (NexIUM) 40 MG capsule Take 40 mg by mouth every morning before breakfast.     • fluticasone (FLONASE) 50 MCG/ACT nasal spray 2 sprays into the nostril(s) as directed by provider As Needed.     • rosuvastatin (CRESTOR) 10 MG tablet Take 1 tablet by mouth Daily. 90 tablet 3   • rosuvastatin (CRESTOR) 20 MG tablet Take 20 mg by mouth Daily.     • ciprofloxacin (Cipro) 500 MG tablet Take 1 tablet by mouth 2 (Two) Times a Day. 14 tablet 0   • metroNIDAZOLE (Flagyl) 500 MG tablet Take 1 tablet by mouth 3 (Three) Times a Day. 21 tablet 0     No facility-administered medications prior to visit.       No opioid medication identified on active medication list. I have reviewed chart for other potential  high risk medication/s and harmful drug interactions in the elderly.          Aspirin is on active medication list. Aspirin use is indicated based on review of current medical condition/s. Pros and cons of this therapy have been discussed today. Benefits of this medication outweigh potential harm.  Patient has been encouraged to continue taking this medication.  .      Patient Active Problem List   Diagnosis   • Myocardiopathy (HCC)   • Bladder infection, chronic   • Blues   •  "Gastro-esophageal reflux disease without esophagitis   • HLD (hyperlipidemia)   • Prolapse of urethra   • Incomplete bladder emptying   • Gastroesophageal reflux disease with hiatal hernia   • Erosive gastritis   • Diverticulosis of large intestine without hemorrhage   • Chronic UTI   • Foot sprain   • Medicare annual wellness visit, subsequent   • Screening mammogram, encounter for   • Lower abdominal pain   • Hematuria   • Left knee pain   • Chondromalacia of patellofemoral joint, left   • Acute left ankle pain   • Varicose veins of both lower extremities   • Pain and swelling of left lower leg   • Postmenopausal     Advance Care Planning  Advance Directive is not on file.  ACP discussion was held with the patient during this visit. Patient does not have an advance directive, declines further assistance.          Objective    Vitals:    11/03/21 1435   BP: 114/70   Pulse: 95   Resp: 13   Temp: 97.3 °F (36.3 °C)   SpO2: 98%   Weight: 67.6 kg (149 lb)   Height: 165.1 cm (65\")   PainSc: 0-No pain     BMI Readings from Last 1 Encounters:   11/03/21 24.79 kg/m²   BMI is within normal parameters. No follow-up required.    Does the patient have evidence of cognitive impairment? No    Physical Exam  Vitals and nursing note reviewed.   Constitutional:       Appearance: Normal appearance. She is well-developed, well-groomed and normal weight.      Interventions: Face mask in place.   HENT:      Head: Normocephalic and atraumatic.   Neck:      Thyroid: No thyroid mass, thyromegaly or thyroid tenderness.      Vascular: No carotid bruit.      Trachea: Trachea and phonation normal. No tracheal tenderness.   Cardiovascular:      Rate and Rhythm: Normal rate and regular rhythm.      Pulses: Normal pulses.      Heart sounds: Normal heart sounds, S1 normal and S2 normal. No murmur heard.      Pulmonary:      Effort: Pulmonary effort is normal.      Breath sounds: Normal breath sounds and air entry.   Abdominal:      General: Bowel " sounds are normal.      Palpations: Abdomen is soft. There is no hepatomegaly.      Tenderness: There is no abdominal tenderness. There is no right CVA tenderness, left CVA tenderness, guarding or rebound. Negative signs include Martin's sign, Rovsing's sign, McBurney's sign, psoas sign and obturator sign.   Musculoskeletal:      Cervical back: Neck supple.      Right lower leg: No edema.      Left lower leg: No edema.   Skin:     General: Skin is warm and dry.      Capillary Refill: Capillary refill takes less than 2 seconds.   Neurological:      Mental Status: She is alert and oriented to person, place, and time.   Psychiatric:         Attention and Perception: Attention and perception normal.         Mood and Affect: Mood and affect normal.         Speech: Speech normal.         Behavior: Behavior normal. Behavior is cooperative.         Thought Content: Thought content normal.         Cognition and Memory: Cognition and memory normal.         Judgment: Judgment normal.     I wore a facial mask, face shield, and gloves during this patient encounter.  Patient also wearing a surgical mask.  Hand hygiene performed before and after seeing the patient.    Lab Results   Component Value Date    TRIG 164 (H) 10/27/2021    HDL 48 10/27/2021     (H) 10/27/2021    VLDL 29 10/27/2021            HEALTH RISK ASSESSMENT    Smoking Status:  Social History     Tobacco Use   Smoking Status Never Smoker   Smokeless Tobacco Never Used     Alcohol Consumption:  Social History     Substance and Sexual Activity   Alcohol Use No     Fall Risk Screen:    STEADI Fall Risk Assessment was completed, and patient is at LOW risk for falls.Assessment completed on:11/3/2021    Depression Screening:  PHQ-2/PHQ-9 Depression Screening 11/3/2021   Little interest or pleasure in doing things 0   Feeling down, depressed, or hopeless 0   Trouble falling or staying asleep, or sleeping too much 3   Feeling tired or having little energy 0   Poor  appetite or overeating 0   Feeling bad about yourself - or that you are a failure or have let yourself or your family down 0   Trouble concentrating on things, such as reading the newspaper or watching television 0   Moving or speaking so slowly that other people could have noticed. Or the opposite - being so fidgety or restless that you have been moving around a lot more than usual 0   Thoughts that you would be better off dead, or of hurting yourself in some way 0   Total Score 3   If you checked off any problems, how difficult have these problems made it for you to do your work, take care of things at home, or get along with other people? Not difficult at all       Health Habits and Functional and Cognitive Screening:  Functional & Cognitive Status 11/3/2021   Do you have difficulty preparing food and eating? No   Do you have difficulty bathing yourself, getting dressed or grooming yourself? No   Do you have difficulty using the toilet? No   Do you have difficulty moving around from place to place? No   Do you have trouble with steps or getting out of a bed or a chair? No   Current Diet Well Balanced Diet   Dental Exam Up to date   Eye Exam Up to date   Exercise (times per week) 0 times per week   Current Exercises Include No Regular Exercise   Current Exercise Activities Include -   Do you need help using the phone?  No   Are you deaf or do you have serious difficulty hearing?  No   Do you need help with transportation? No   Do you need help shopping? No   Do you need help preparing meals?  No   Do you need help with housework?  No   Do you need help with laundry? No   Do you need help taking your medications? No   Do you need help managing money? No   Do you ever drive or ride in a car without wearing a seat belt? No   Have you felt unusual stress, anger or loneliness in the last month? No   Who do you live with? Spouse   If you need help, do you have trouble finding someone available to you? No   Have you been  bothered in the last four weeks by sexual problems? No   Do you have difficulty concentrating, remembering or making decisions? No       Age-appropriate Screening Schedule:  Refer to the list below for future screening recommendations based on patient's age, sex and/or medical conditions. Orders for these recommended tests are listed in the plan section. The patient has been provided with a written plan.    Health Maintenance   Topic Date Due   • DXA SCAN  10/29/2021   • LIPID PANEL  10/27/2022   • INFLUENZA VACCINE  Completed   • MAMMOGRAM  Discontinued   • TDAP/TD VACCINES  Discontinued   • ZOSTER VACCINE  Discontinued              Assessment/Plan   CMS Preventative Services Quick Reference  Risk Factors Identified During Encounter  Obesity/Overweight   The above risks/problems have been discussed with the patient.  Follow up actions/plans if indicated are seen below in the Assessment/Plan Section.  Pertinent information has been shared with the patient in the After Visit Summary.    Diagnoses and all orders for this visit:    1. Medicare annual wellness visit, subsequent (Primary)    2. Mixed hyperlipidemia    3. Gastro-esophageal reflux disease without esophagitis    4. Menopause  -     DEXA Bone Density Axial; Future    5. Acquired hypothyroidism  -     levothyroxine (Synthroid) 50 MCG tablet; Take 1 tablet by mouth Every Morning.  Dispense: 30 tablet; Refill: 3  -     TSH; Future  -     TSH; Future    1.  Annual Medicare wellness examination with hyperlipidemia: Have reviewed above blood work with her at office visit today.  Will continue her current Crestor 30 mg medication at home as directed.  Will return to office in 6 months for repeat fasting labs.  2.  Chronic and stable GERD: Doing well with her current Nexium medication.  No refills are needed.  3.  Chronic and stable menopause: Have placed orders for DEXA scan for [facility].  Will be notified of test results when completed.  4.  New  acquired hypothyroidism: TSH was elevated.  She is currently symptomatic.  Will start on levothyroxine 50 mcg daily.  I have instructed her how to take the medication.  Plan to recheck TSH in 6-8 weeks at [facility].  Orders have been placed.    Follow Up:   Return in about 6 months (around 5/3/2022), or 6-8 weeks TSh.     An After Visit Summary and PPPS were made available to the patient.               Patient Counseling:  --Nutrition: Stressed importance of moderation in sodium/caffeine intake, saturated fat and cholesterol.  Discussed caloric balance, sufficient intake of fresh fruits, vegetables, fiber,   calcium, iron.  --Discussed the new recommendation against daily use of baby aspirin for primary prevention in low risk patients.  --Exercise: Stressed the importance of regular exercise by incorporating into daily routine.    --Substance Abuse: Discussed cessation/primary prevention of tobacco, alcohol, or other drug use; driving or other dangerous activities under the influence.    --Dental health: Discussed importance of regular tooth brushing, flossing, and dental visits.  -- Suggested having eyes and vision checked if needed or past due.  --Immunizations reviewed.  --Discussed benefits of screening colonoscopy.    MP [facility]  [address] [address]  Dept: [phone]  Dept Fax: [phone]  Loc: [phone]  Loc Fax: [phone]

## 2021-11-15 ENCOUNTER — APPOINTMENT (OUTPATIENT)
Dept: BONE DENSITY | Facility: HOSPITAL | Age: 76
End: 2021-11-15

## 2021-11-15 DIAGNOSIS — Z78.0 MENOPAUSE: ICD-10-CM

## 2021-11-15 PROCEDURE — 77080 DXA BONE DENSITY AXIAL: CPT

## 2021-12-03 ENCOUNTER — LAB (OUTPATIENT)
Dept: LAB | Facility: HOSPITAL | Age: 76
End: 2021-12-03

## 2021-12-03 DIAGNOSIS — E03.9 ACQUIRED HYPOTHYROIDISM: ICD-10-CM

## 2021-12-03 LAB — TSH SERPL DL<=0.05 MIU/L-ACNC: 0.9 UIU/ML (ref 0.27–4.2)

## 2021-12-03 PROCEDURE — 84443 ASSAY THYROID STIM HORMONE: CPT

## 2021-12-28 ENCOUNTER — TELEPHONE (OUTPATIENT)
Dept: URGENT CARE | Facility: CLINIC | Age: 76
End: 2021-12-28

## 2021-12-28 DIAGNOSIS — J02.0 STREP PHARYNGITIS: Primary | ICD-10-CM

## 2021-12-28 RX ORDER — CLINDAMYCIN HYDROCHLORIDE 300 MG/1
300 CAPSULE ORAL 3 TIMES DAILY
Qty: 30 CAPSULE | Refills: 0 | Status: SHIPPED | OUTPATIENT
Start: 2021-12-28 | End: 2022-01-07

## 2022-01-21 ENCOUNTER — TELEPHONE (OUTPATIENT)
Dept: FAMILY MEDICINE CLINIC | Facility: CLINIC | Age: 77
End: 2022-01-21

## 2022-01-21 NOTE — TELEPHONE ENCOUNTER
PATIENT CALLED STATING THAT SHE WENT ON 01/19/22 TO GET A COVID TEST AND SHE RECEIVED HER POSITIVE RESULTS ON 01/20/22.    PATIENT STATES THAT SHE IS HAVING DIFFICULTY EATING FOOD DUE TO THE THROAT PAIN AND WOULD LIKE TO KNOW WHAT [facility]I [employer] SUGGESTS AS A COURSE OF ACTION.    SHE SAYS SHE NO LONGER HAS A FEVER, BUT SHE DOES HAVE A COUGH AND CONGESTION.    PATIENT IS CONCERNED BECAUSE HER  HAS HEART ISSUES AND SHE IS AFRAID THAT SHE WILL END UP GIVING IT TO HIM.    PLEASE ADVISE  [phone]

## 2022-01-21 NOTE — TELEPHONE ENCOUNTER
I spoke with patient on January 21, 2022 at 5:10 PM.  States her symptoms started on Tuesday, January 18, 2022.  Had testing on January 19, 2022 which was positive.  She would like to try for the infusion/IV medication for COVID.  Have faxed the orders.  Instructed that she will be notified by hospital if she is a candidate for the treatment.  Meanwhile we will continue comfort measures at home.  She voiced understanding.

## 2022-01-21 NOTE — TELEPHONE ENCOUNTER
I recommended that she do water gargles or ice tea gargles for her sore throat.  Take Tylenol as needed for body aches and fevers.  Use Mucinex for cough.  Increase water intake and rest.  May take vitamin C, zinc and vitamin D for symptoms.  If symptoms worsen or breathing worsens, please notify office of or go to ER.  Needs to isolate at home for 5-10 days after onset of symptoms.

## 2022-04-20 DIAGNOSIS — E78.2 MIXED HYPERLIPIDEMIA: Primary | ICD-10-CM

## 2022-04-20 DIAGNOSIS — E03.9 ACQUIRED HYPOTHYROIDISM: ICD-10-CM

## 2022-04-28 ENCOUNTER — LAB (OUTPATIENT)
Dept: LAB | Facility: HOSPITAL | Age: 77
End: 2022-04-28

## 2022-04-28 LAB
ALBUMIN SERPL-MCNC: 4.4 G/DL (ref 3.5–5.2)
ALBUMIN/GLOB SERPL: 1.6 G/DL
ALP SERPL-CCNC: 106 U/L (ref 39–117)
ALT SERPL W P-5'-P-CCNC: 19 U/L (ref 1–33)
ANION GAP SERPL CALCULATED.3IONS-SCNC: 11.8 MMOL/L (ref 5–15)
AST SERPL-CCNC: 20 U/L (ref 1–32)
BASOPHILS # BLD AUTO: 0.07 10*3/MM3 (ref 0–0.2)
BASOPHILS NFR BLD AUTO: 1.2 % (ref 0–1.5)
BILIRUB SERPL-MCNC: 0.3 MG/DL (ref 0–1.2)
BUN SERPL-MCNC: 14 MG/DL (ref 8–23)
BUN/CREAT SERPL: 15.4 (ref 7–25)
CALCIUM SPEC-SCNC: 8.9 MG/DL (ref 8.6–10.5)
CHLORIDE SERPL-SCNC: 103 MMOL/L (ref 98–107)
CHOLEST SERPL-MCNC: 184 MG/DL (ref 0–200)
CO2 SERPL-SCNC: 26.2 MMOL/L (ref 22–29)
CREAT SERPL-MCNC: 0.91 MG/DL (ref 0.57–1)
DEPRECATED RDW RBC AUTO: 41.5 FL (ref 37–54)
EGFRCR SERPLBLD CKD-EPI 2021: 64.7 ML/MIN/1.73
EOSINOPHIL # BLD AUTO: 0.3 10*3/MM3 (ref 0–0.4)
EOSINOPHIL NFR BLD AUTO: 5.1 % (ref 0.3–6.2)
ERYTHROCYTE [DISTWIDTH] IN BLOOD BY AUTOMATED COUNT: 13.4 % (ref 12.3–15.4)
GLOBULIN UR ELPH-MCNC: 2.8 GM/DL
GLUCOSE SERPL-MCNC: 94 MG/DL (ref 65–99)
HCT VFR BLD AUTO: 40.4 % (ref 34–46.6)
HDLC SERPL-MCNC: 47 MG/DL (ref 40–60)
HGB BLD-MCNC: 13.3 G/DL (ref 12–15.9)
IMM GRANULOCYTES # BLD AUTO: 0.02 10*3/MM3 (ref 0–0.05)
IMM GRANULOCYTES NFR BLD AUTO: 0.3 % (ref 0–0.5)
LDLC SERPL CALC-MCNC: 95 MG/DL (ref 0–100)
LDLC/HDLC SERPL: 1.86 {RATIO}
LYMPHOCYTES # BLD AUTO: 1.76 10*3/MM3 (ref 0.7–3.1)
LYMPHOCYTES NFR BLD AUTO: 29.9 % (ref 19.6–45.3)
MCH RBC QN AUTO: 27.9 PG (ref 26.6–33)
MCHC RBC AUTO-ENTMCNC: 32.9 G/DL (ref 31.5–35.7)
MCV RBC AUTO: 84.7 FL (ref 79–97)
MONOCYTES # BLD AUTO: 0.39 10*3/MM3 (ref 0.1–0.9)
MONOCYTES NFR BLD AUTO: 6.6 % (ref 5–12)
NEUTROPHILS NFR BLD AUTO: 3.35 10*3/MM3 (ref 1.7–7)
NEUTROPHILS NFR BLD AUTO: 56.9 % (ref 42.7–76)
NRBC BLD AUTO-RTO: 0 /100 WBC (ref 0–0.2)
PLATELET # BLD AUTO: 364 10*3/MM3 (ref 140–450)
PMV BLD AUTO: 9.9 FL (ref 6–12)
POTASSIUM SERPL-SCNC: 4 MMOL/L (ref 3.5–5.2)
PROT SERPL-MCNC: 7.2 G/DL (ref 6–8.5)
RBC # BLD AUTO: 4.77 10*6/MM3 (ref 3.77–5.28)
SODIUM SERPL-SCNC: 141 MMOL/L (ref 136–145)
T-UPTAKE NFR SERPL: 1.16 TBI (ref 0.8–1.3)
T4 SERPL-MCNC: 7.68 MCG/DL (ref 4.5–11.7)
TRIGL SERPL-MCNC: 247 MG/DL (ref 0–150)
TSH SERPL DL<=0.05 MIU/L-ACNC: 4.55 UIU/ML (ref 0.27–4.2)
VLDLC SERPL-MCNC: 42 MG/DL (ref 5–40)
WBC NRBC COR # BLD: 5.89 10*3/MM3 (ref 3.4–10.8)

## 2022-04-28 PROCEDURE — 85025 COMPLETE CBC W/AUTO DIFF WBC: CPT | Performed by: PHYSICIAN ASSISTANT

## 2022-04-28 PROCEDURE — 36415 COLL VENOUS BLD VENIPUNCTURE: CPT | Performed by: PHYSICIAN ASSISTANT

## 2022-04-28 PROCEDURE — 84436 ASSAY OF TOTAL THYROXINE: CPT | Performed by: PHYSICIAN ASSISTANT

## 2022-04-28 PROCEDURE — 80053 COMPREHEN METABOLIC PANEL: CPT | Performed by: PHYSICIAN ASSISTANT

## 2022-04-28 PROCEDURE — 84443 ASSAY THYROID STIM HORMONE: CPT | Performed by: PHYSICIAN ASSISTANT

## 2022-04-28 PROCEDURE — 84479 ASSAY OF THYROID (T3 OR T4): CPT | Performed by: PHYSICIAN ASSISTANT

## 2022-04-28 PROCEDURE — 80061 LIPID PANEL: CPT | Performed by: PHYSICIAN ASSISTANT

## 2022-05-02 RX ORDER — ROSUVASTATIN CALCIUM 10 MG/1
TABLET, COATED ORAL
Qty: 90 TABLET | Refills: 0 | Status: SHIPPED | OUTPATIENT
Start: 2022-05-02 | End: 2022-07-28

## 2022-05-04 ENCOUNTER — OFFICE VISIT (OUTPATIENT)
Dept: FAMILY MEDICINE CLINIC | Facility: CLINIC | Age: 77
End: 2022-05-04

## 2022-05-04 DIAGNOSIS — E03.9 ACQUIRED HYPOTHYROIDISM: ICD-10-CM

## 2022-05-04 DIAGNOSIS — E78.2 MIXED HYPERLIPIDEMIA: Primary | ICD-10-CM

## 2022-05-04 PROCEDURE — 99214 OFFICE O/P EST MOD 30 MIN: CPT | Performed by: PHYSICIAN ASSISTANT

## 2022-05-04 RX ORDER — LEVOTHYROXINE SODIUM 0.05 MG/1
50 TABLET ORAL DAILY
Qty: 30 TABLET | Refills: 3 | Status: SHIPPED | OUTPATIENT
Start: 2022-05-04 | End: 2022-06-23 | Stop reason: SDUPTHER

## 2022-05-04 NOTE — PROGRESS NOTES
"Chief Complaint  Hyperlipidemia (Management) and Hypothyroidism (Management)    Subjective          Codie Munson presents to [facility]  History of Present Illness  Codie is a 76 year old female who presents hyperlipidemia and hypothyroid management.  Codie states she had stopped the thyroid medication.  States she just did not think she needed it.  States she does have periods of fatigue and hair loss.  Has been taking her Crestor 30 mg medication daily.  States her diet has not been as healthy.  Tends to eat more sweets.  Codie denied any current chest pain, shortness of air, dizziness, vision changes or swelling of ankles.  Bowel movements have been normal without dark black tarry stools.  Review of Systems   All other systems reviewed and are negative.    Objective   Vital Signs:   /82   Pulse 93   Temp 97.8 °F (36.6 °C)   Resp 14   Ht 162.6 cm (64\")   Wt 68.9 kg (152 lb)   SpO2 99%   BMI 26.09 kg/m²     Physical Exam  Vitals and nursing note reviewed.   Constitutional:       Appearance: Normal appearance. She is well-developed, well-groomed and overweight.      Interventions: Face mask in place.   HENT:      Head: Normocephalic and atraumatic.   Neck:      Thyroid: No thyroid mass, thyromegaly or thyroid tenderness.      Vascular: No carotid bruit.      Trachea: Trachea and phonation normal. No tracheal tenderness.   Cardiovascular:      Rate and Rhythm: Normal rate and regular rhythm.      Pulses: Normal pulses.      Heart sounds: Normal heart sounds, S1 normal and S2 normal. No murmur heard.  Pulmonary:      Effort: Pulmonary effort is normal.      Breath sounds: Normal breath sounds and air entry.   Abdominal:      General: Bowel sounds are normal.      Palpations: Abdomen is soft. There is no hepatomegaly.      Tenderness: There is no abdominal tenderness. There is no right CVA tenderness, left CVA tenderness, guarding or rebound. Negative signs include " Martin's sign, Rovsing's sign, McBurney's sign, psoas sign and obturator sign.   Musculoskeletal:      Cervical back: Neck supple.      Right lower leg: No edema.      Left lower leg: No edema.   Skin:     General: Skin is warm and dry.      Capillary Refill: Capillary refill takes less than 2 seconds.   Neurological:      Mental Status: She is alert and oriented to person, place, and time.   Psychiatric:         Attention and Perception: Attention and perception normal.         Mood and Affect: Mood and affect normal.         Speech: Speech normal.         Behavior: Behavior normal. Behavior is cooperative.         Thought Content: Thought content normal.         Cognition and Memory: Cognition and memory normal.         Judgment: Judgment normal.     I wore a facial mask, face shield, and gloves during this patient encounter.  Patient also wearing a surgical mask.  Hand hygiene performed before and after seeing the patient.     Result Review :     CMP    CMP 10/14/21 10/27/21 4/28/22   Glucose  91 94   BUN  10 14   Creatinine 0.90 0.92 0.91   eGFR Non African Am  59 (A)    Sodium  141 141   Potassium  4.7 4.0   Chloride  104 103   Calcium  8.8 8.9   Albumin  4.40 4.40   Total Bilirubin  0.3 0.3   Alkaline Phosphatase  94 106   AST (SGOT)  36 (A) 20   ALT (SGPT)  29 19   (A) Abnormal value       Comments are available for some flowsheets but are not being displayed.           CBC w/diff    CBC w/Diff 10/27/21 4/28/22   WBC 6.35 5.89   RBC 4.77 4.77   Hemoglobin 13.1 13.3   Hematocrit 41.7 40.4   MCV 87.4 84.7   MCH 27.5 27.9   MCHC 31.4 (A) 32.9   RDW 13.5 13.4   Platelets 387 364   Neutrophil Rel % 52.5 56.9   Immature Granulocyte Rel % 0.3 0.3   Lymphocyte Rel % 35.6 29.9   Monocyte Rel % 6.5 6.6   Eosinophil Rel % 3.8 5.1   Basophil Rel % 1.3 1.2   (A) Abnormal value            Lipid Panel    Lipid Panel 10/27/21 4/28/22   Total Cholesterol 180 184   Triglycerides 164 (A) 247 (A)   HDL Cholesterol 48 47   VLDL  Cholesterol 29 42 (A)   LDL Cholesterol  103 (A) 95   LDL/HDL Ratio 2.07 1.86   (A) Abnormal value            TSH    TSH 10/27/21 12/3/21 4/28/22   TSH 4.470 (A) 0.899 4.550 (A)   (A) Abnormal value                      Assessment and Plan    Diagnoses and all orders for this visit:    1. Mixed hyperlipidemia (Primary)    2. Acquired hypothyroidism  -     levothyroxine (Synthroid) 50 MCG tablet; Take 1 tablet by mouth Daily.  Dispense: 30 tablet; Refill: 3  -     TSH; Future    1.  Chronic and uncontrolled hyperlipidemia:I have reviewed above blood work with her at office visit today.  She will continue her Crestor 30 mg daily.  Stressed importance of decreasing her sugar and carbohydrates in diet.  Plan to recheck fasting labs in 6 months with Medicare wellness.  2.  Chronic and uncontrolled hypothyroidism: She had stopped her thyroid medication.  TSH is elevated and she is currently symptomatic.  Stressed the importance of taking her thyroid medication daily.  Have sent a prescription of levothyroxine 50 mcg to pharmacy to take once daily.  She will check her TSH in 6-8 weeks.  She voiced understanding.    Follow Up   Return in about 6 months (around 11/4/2022), or labs prior, for Medicare Wellness.  Patient was given instructions and counseling regarding her condition or for health maintenance advice. Please see specific information pulled into the AVS if appropriate.     [facility]  [address] [address]  Dept: [phone]  Dept Fax: [phone]  Loc: [phone]  Loc Fax: [phone]

## 2022-05-05 VITALS
OXYGEN SATURATION: 99 % | HEIGHT: 64 IN | SYSTOLIC BLOOD PRESSURE: 118 MMHG | TEMPERATURE: 97.8 F | BODY MASS INDEX: 25.95 KG/M2 | DIASTOLIC BLOOD PRESSURE: 82 MMHG | RESPIRATION RATE: 14 BRPM | HEART RATE: 93 BPM | WEIGHT: 152 LBS

## 2022-05-11 ENCOUNTER — OFFICE VISIT (OUTPATIENT)
Dept: CARDIOLOGY | Facility: CLINIC | Age: 77
End: 2022-05-11

## 2022-05-11 VITALS
BODY MASS INDEX: 25.61 KG/M2 | RESPIRATION RATE: 16 BRPM | HEART RATE: 91 BPM | HEIGHT: 64 IN | DIASTOLIC BLOOD PRESSURE: 84 MMHG | SYSTOLIC BLOOD PRESSURE: 130 MMHG | WEIGHT: 150 LBS | OXYGEN SATURATION: 96 %

## 2022-05-11 DIAGNOSIS — E78.2 MIXED HYPERLIPIDEMIA: ICD-10-CM

## 2022-05-11 DIAGNOSIS — I42.9 CARDIOMYOPATHY, UNSPECIFIED TYPE: Primary | ICD-10-CM

## 2022-05-11 DIAGNOSIS — I65.23 BILATERAL CAROTID ARTERY STENOSIS: ICD-10-CM

## 2022-05-11 DIAGNOSIS — R06.09 DYSPNEA ON EXERTION: ICD-10-CM

## 2022-05-11 PROCEDURE — 99214 OFFICE O/P EST MOD 30 MIN: CPT | Performed by: NURSE PRACTITIONER

## 2022-05-11 PROCEDURE — 93000 ELECTROCARDIOGRAM COMPLETE: CPT | Performed by: NURSE PRACTITIONER

## 2022-05-11 NOTE — PROGRESS NOTES
Date of Office Visit: 2022  Encounter Provider: MARISEL Singh  Place of Service: [facility] CARDIOLOGY  Patient Name: Codie Munson  :1945  Primary Cardiologist: Dr. Lane    CC:  Annual cardiac evaluation    Dear Hany    HPI: Codie Munson is a pleasant 76 y.o. female who presents 2022 for cardiac follow up.  She is a new patient to me and I reviewed her past medical records.  She is a patient of Dr. Lane.    Dr. Lane originally saw her for complaints of dizziness, palpitations, and shortness of air in . at that time she had a stress study which was abnormal with subsequent cardiac catheterization at [facility] which showed no significant coronary artery disease.  Her left ventricular systolic function was normal. She however had an echocardiogram done at the same time which showed an ejection fraction of 40%. Because of her lightheadedness and dizziness, she had carotid duplex studies which showed minimal disease of the left carotid artery. She then had repeat carotid duplex study performed on 2010 which showed no carotid artery disease.      She had a bladder resuspension with Dr. Harish Arellano.  She also has a pessary which was placed by Dr. Navarrete.     She has reflux of the lesser and greater saphenous veins. She sees Dr. Perrin for venous incompetence now and had vein stripping on the left.  She is wearing compression stockings her leg feels better.  Carotids with Dr. Perrin did show some mild atherosclerosis without stenosis.     Her echocardiogram which was done 2016, and this showed ejection fraction of 60% with grade I diastolic dysfunction, normal segmental wall motion and no significant valve disease.      She had a normal stress nuclear study and vascular screening done 2017.       She saw Dr. Lane 2021.  2021 show normal CMP, total cholesterol 91, HDL 42, , VLDL 34,  "triglycerides 194, normal CBC.  She has had an electrical \"flashing\" sensation in her brain that causes her to almost stuttering her movements.  She has never been diagnosed with migraine headaches before but there is no suggestion by physician at one point that she might happen.  She is concerned is basically getting worse.  She does not feel her heart racing or skipping.  She has had no dizziness or syncope.  Her energy level is able.  She has no orthopnea or PND.  She is taking her medications as directed.  Her triglycerides have gone up.  She does eat potatoes every day.    She presents today for her annual cardiac evaluation.  She had labs April 2022 that showed an unremarkable CMP and CBC.  Her thyroid showed a TSH of 4.550, T4 total 7.68 and T uptake 1.16.  Lipid panel revealed total cholesterol 184, HDL 47, LDL 95 and triglycerides 247.  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  She states she continues to wear a mask whenever she is out.  She has been double VAX but has not received the booster.  Her blood pressure is controlled.  She denies any palpitations, lower extremity edema, chest pain or chest pressure.  She has not had any fatigue.  She states she does have a history of some chronic dizziness but she has not had any recent episodes.  She states she had COVID in January, again she had had 2 vaccines, she states since then she has just had some shortness of breath that feels like it is in the upper part of her chest and some achiness.     Past Medical History:   Diagnosis Date   • Cardiomyopathy (HCC)    • Chronic cystitis    • Cough    • Depression    • GERD (gastroesophageal reflux disease)    • Hyperlipidemia    • Hypertension    • Scarlet fever    • Strain of thoracic region    • Urethral prolapse    • URI (upper respiratory infection)    • Urinary retention        Past Surgical History:   Procedure Laterality Date   • APPENDECTOMY     • BLADDER SURGERY     • CARDIAC CATHETERIZATION      " Normal. Performed at [facility].   •  SECTION     • VEIN LIGATION AND STRIPPING         Social History     Socioeconomic History   • Marital status:    Tobacco Use   • Smoking status: Never Smoker   • Smokeless tobacco: Never Used   • Tobacco comment: drink caffiene daily   Vaping Use   • Vaping Use: Never used   Substance and Sexual Activity   • Alcohol use: No   • Drug use: Defer   • Sexual activity: Defer       Family History   Problem Relation Age of Onset   • Heart failure Mother    • Cancer Mother         Bladder   • Kidney failure Mother    • Aneurysm Father    • Heart disease Father         CABG   • Stroke Father    • Crohn's disease Sister    • Prostate cancer Brother    • Cancer Brother         bladder   • Breast cancer Neg Hx        The following portion of the patient's history were reviewed and updated as appropriate: past medical history, past surgical history, past social history, past family history, allergies, current medications, and problem list.    Review of Systems   Constitutional: Negative for diaphoresis, fever and malaise/fatigue.   HENT: Negative for congestion, hearing loss, hoarse voice, nosebleeds and sore throat.    Eyes: Negative for photophobia, vision loss in left eye, vision loss in right eye and visual disturbance.   Cardiovascular: Negative for chest pain, dyspnea on exertion, irregular heartbeat, leg swelling, near-syncope, orthopnea, palpitations, paroxysmal nocturnal dyspnea and syncope.   Respiratory: Positive for shortness of breath (since COVID 2022). Negative for cough, hemoptysis, sleep disturbances due to breathing, snoring, sputum production and wheezing.    Endocrine: Negative for cold intolerance, heat intolerance, polydipsia, polyphagia and polyuria.   Hematologic/Lymphatic: Negative for bleeding problem. Does not bruise/bleed easily.   Skin: Negative for color change, dry skin, poor wound healing, rash and suspicious lesions.  "  Musculoskeletal: Negative for arthritis, back pain, falls, gout, joint pain, joint swelling, muscle cramps, muscle weakness and myalgias.   Gastrointestinal: Negative for bloating, abdominal pain, constipation, diarrhea, dysphagia, melena, nausea and vomiting.   Neurological: Negative for excessive daytime sleepiness, dizziness, headaches, light-headedness, loss of balance, numbness, paresthesias, seizures, vertigo and weakness.   Psychiatric/Behavioral: Negative for depression, memory loss and substance abuse. The patient is not nervous/anxious.        Allergies   Allergen Reactions   • Augmentin [Amoxicillin-Pot Clavulanate] Rash   • Biaxin [Clarithromycin] Rash         Current Outpatient Medications:   •  Cholecalciferol (VITAMIN D3) 2000 units tablet, Take  by mouth., Disp: , Rfl:   •  conjugated estrogens (PREMARIN) 0.625 MG/GM vaginal cream, USE A PEA-SIZED AMOUNT ON FINGERTIP AND PLACE IN THE VAGINAL OPENING 3 X PER WEEK., Disp: , Rfl:   •  esomeprazole (NexIUM) 40 MG capsule, Take 40 mg by mouth every morning before breakfast., Disp: , Rfl:   •  fluticasone (FLONASE) 50 MCG/ACT nasal spray, 2 sprays into the nostril(s) as directed by provider As Needed., Disp: , Rfl:   •  levothyroxine (Synthroid) 50 MCG tablet, Take 1 tablet by mouth Daily., Disp: 30 tablet, Rfl: 3  •  rosuvastatin (CRESTOR) 10 MG tablet, TAKE ONE TABLET BY MOUTH DAILY, Disp: 90 tablet, Rfl: 0  •  rosuvastatin (CRESTOR) 20 MG tablet, Take 20 mg by mouth Daily., Disp: , Rfl:         Objective:     Vitals:    05/11/22 1353   BP: 130/84   Pulse: 91   Resp: 16   SpO2: 96%   Weight: 68 kg (150 lb)   Height: 162.6 cm (64\")     Body mass index is 25.75 kg/m².      Vitals reviewed.   Constitutional:       General: Not in acute distress.     Appearance: Normal and healthy appearance. Well-developed and not in distress.   Eyes:      General:         Right eye: No discharge.         Left eye: No discharge.      Conjunctiva/sclera: Conjunctivae " normal.   HENT:      Head: Normocephalic and atraumatic.      Right Ear: External ear normal.      Left Ear: External ear normal.      Nose: Nose normal.   Neck:      Thyroid: No thyromegaly.      Vascular: No JVD.      Trachea: No tracheal deviation.      Lymphadenopathy: No cervical adenopathy.   Pulmonary:      Effort: Pulmonary effort is normal. No respiratory distress.      Breath sounds: Normal breath sounds. No wheezing. No rales.   Chest:      Chest wall: Not tender to palpatation.   Cardiovascular:      Normal rate. Regular rhythm.      No gallop.   Pulses:     Intact distal pulses.   Edema:     Peripheral edema absent.   Abdominal:      General: There is no distension.      Palpations: Abdomen is soft.      Tenderness: There is no abdominal tenderness.   Musculoskeletal: Normal range of motion.         General: No tenderness or deformity.      Cervical back: Normal range of motion and neck supple. Skin:     General: Skin is warm and dry.      Findings: No erythema or rash.   Neurological:      Mental Status: Alert and oriented to person, place, and time.      Coordination: Coordination normal.   Psychiatric:         Attention and Perception: Attention normal.         Mood and Affect: Mood normal.         Speech: Speech normal.         Behavior: Behavior normal. Behavior is cooperative.         Thought Content: Thought content normal.         Cognition and Memory: Cognition normal.         Judgment: Judgment normal.               ECG 12 Lead    Date/Time: 5/11/2022 2:08 PM  Performed by: Sri Elise APRN  Authorized by: Sri Elise APRN   Comparison: compared with previous ECG from 4/30/2021  Similar to previous ECG  Rhythm: sinus rhythm  Rate: normal  Conduction: conduction normal  ST Segments: ST segments normal  T Waves: T waves normal  QRS axis: normal    Clinical impression: normal ECG              Assessment:       Diagnosis Plan   1. Cardiomyopathy, unspecified type (HCC)  Adult Transthoracic  Echo Complete W/ Cont if Necessary Per Protocol   2. Mixed hyperlipidemia     3. Bilateral carotid artery stenosis  US Carotid Bilateral   4. Dyspnea on exertion  Adult Transthoracic Echo Complete W/ Cont if Necessary Per Protocol          Plan:       1. Hyperlipidemia, on Crestor, triglycerides are rising, advised avoiding carbs and increase Crestor 30 mg nightly.  She is making dietary changes.  2. Hypertension - controlled  3. Normal renal arteriogram in 2002.   4. Normal cardiac catheterization in 2005. Denies angina  5. Venous varicosities, s/p stripping on left 6/2019. Sees Dr. Perrin.  6. Numbness of her third toe on her right foot. Stable.   7. Recent bladder resuspension with Dr. Harish Arellano.  8. Allergies, getting allergy shots, sees Dr. Landeros.  9.  Past migraine headaches, refer to Dr. Felix.  10.  Dyspnea after Covid - check echo  11.  Bilateral carotid stenosis - check carotid US    Check echo and carotid US  RTO in 1 year with RM    As always, it has been a pleasure to participate in your patient's care. Thank you.       Sincerely,       MARISEL Singh      Current Outpatient Medications:   •  Cholecalciferol (VITAMIN D3) 2000 units tablet, Take  by mouth., Disp: , Rfl:   •  conjugated estrogens (PREMARIN) 0.625 MG/GM vaginal cream, USE A PEA-SIZED AMOUNT ON FINGERTIP AND PLACE IN THE VAGINAL OPENING 3 X PER WEEK., Disp: , Rfl:   •  esomeprazole (NexIUM) 40 MG capsule, Take 40 mg by mouth every morning before breakfast., Disp: , Rfl:   •  fluticasone (FLONASE) 50 MCG/ACT nasal spray, 2 sprays into the nostril(s) as directed by provider As Needed., Disp: , Rfl:   •  levothyroxine (Synthroid) 50 MCG tablet, Take 1 tablet by mouth Daily., Disp: 30 tablet, Rfl: 3  •  rosuvastatin (CRESTOR) 10 MG tablet, TAKE ONE TABLET BY MOUTH DAILY, Disp: 90 tablet, Rfl: 0  •  rosuvastatin (CRESTOR) 20 MG tablet, Take 20 mg by mouth Daily., Disp: , Rfl:       Dictated utilizing Dragon dictation

## 2022-05-17 ENCOUNTER — HOSPITAL ENCOUNTER (OUTPATIENT)
Dept: ULTRASOUND IMAGING | Facility: HOSPITAL | Age: 77
Discharge: HOME | End: 2022-05-17

## 2022-05-17 ENCOUNTER — HOSPITAL ENCOUNTER (OUTPATIENT)
Dept: CARDIOLOGY | Facility: HOSPITAL | Age: 77
Discharge: HOME | End: 2022-05-17

## 2022-05-17 VITALS
BODY MASS INDEX: 25.59 KG/M2 | SYSTOLIC BLOOD PRESSURE: 155 MMHG | HEART RATE: 91 BPM | DIASTOLIC BLOOD PRESSURE: 81 MMHG | HEIGHT: 64 IN | WEIGHT: 149.91 LBS

## 2022-05-17 DIAGNOSIS — R06.09 DYSPNEA ON EXERTION: ICD-10-CM

## 2022-05-17 DIAGNOSIS — I65.23 BILATERAL CAROTID ARTERY STENOSIS: ICD-10-CM

## 2022-05-17 DIAGNOSIS — I42.9 CARDIOMYOPATHY, UNSPECIFIED TYPE: ICD-10-CM

## 2022-05-17 PROCEDURE — 93306 TTE W/DOPPLER COMPLETE: CPT

## 2022-05-17 PROCEDURE — 93880 EXTRACRANIAL BILAT STUDY: CPT

## 2022-05-17 PROCEDURE — 93306 TTE W/DOPPLER COMPLETE: CPT | Performed by: INTERNAL MEDICINE

## 2022-05-17 PROCEDURE — 25010000002 PERFLUTREN (DEFINITY) 8.476 MG IN SODIUM CHLORIDE (PF) 0.9 % 10 ML INJECTION: Performed by: NURSE PRACTITIONER

## 2022-05-17 RX ADMIN — SODIUM CHLORIDE 2 ML: 9 INJECTION INTRAMUSCULAR; INTRAVENOUS; SUBCUTANEOUS at 18:01

## 2022-05-18 ENCOUNTER — TELEPHONE (OUTPATIENT)
Dept: CARDIOLOGY | Facility: CLINIC | Age: 77
End: 2022-05-18

## 2022-05-18 DIAGNOSIS — E78.2 MIXED HYPERLIPIDEMIA: ICD-10-CM

## 2022-05-18 DIAGNOSIS — I42.9 CARDIOMYOPATHY, UNSPECIFIED TYPE: Primary | ICD-10-CM

## 2022-05-18 DIAGNOSIS — R94.30 ABNORMAL RESULT OF CARDIOVASCULAR FUNCTION STUDY, UNSPECIFIED: ICD-10-CM

## 2022-05-18 DIAGNOSIS — R06.09 DYSPNEA ON EXERTION: ICD-10-CM

## 2022-05-18 LAB
BH CV ECHO MEAS - AO MAX PG: 7 MMHG
BH CV ECHO MEAS - AO MEAN PG: 4.3 MMHG
BH CV ECHO MEAS - AO V2 MAX: 132.3 CM/SEC
BH CV ECHO MEAS - AO V2 VTI: 29.2 CM
BH CV ECHO MEAS - AVA(I,D): 2.6 CM2
BH CV ECHO MEAS - EDV(CUBED): 83.9 ML
BH CV ECHO MEAS - EDV(MOD-SP2): 50.7 ML
BH CV ECHO MEAS - EDV(MOD-SP4): 78.5 ML
BH CV ECHO MEAS - EF(MOD-BP): 40.8 %
BH CV ECHO MEAS - EF(MOD-SP2): 26.4 %
BH CV ECHO MEAS - EF(MOD-SP4): 48.7 %
BH CV ECHO MEAS - ESV(CUBED): 29.2 ML
BH CV ECHO MEAS - ESV(MOD-SP2): 37.3 ML
BH CV ECHO MEAS - ESV(MOD-SP4): 40.3 ML
BH CV ECHO MEAS - FS: 29.6 %
BH CV ECHO MEAS - IVS/LVPW: 0.76 CM
BH CV ECHO MEAS - IVSD: 0.91 CM
BH CV ECHO MEAS - LAT PEAK E' VEL: 7.9 CM/SEC
BH CV ECHO MEAS - LV DIASTOLIC VOL/BSA (35-75): 45.5 CM2
BH CV ECHO MEAS - LV MASS(C)D: 156.2 GRAMS
BH CV ECHO MEAS - LV MAX PG: 3.2 MMHG
BH CV ECHO MEAS - LV MEAN PG: 1.69 MMHG
BH CV ECHO MEAS - LV SYSTOLIC VOL/BSA (12-30): 23.3 CM2
BH CV ECHO MEAS - LV V1 MAX: 89.4 CM/SEC
BH CV ECHO MEAS - LV V1 VTI: 22.1 CM
BH CV ECHO MEAS - LVIDD: 4.4 CM
BH CV ECHO MEAS - LVIDS: 3.1 CM
BH CV ECHO MEAS - LVOT AREA: 3.4 CM2
BH CV ECHO MEAS - LVOT DIAM: 2.07 CM
BH CV ECHO MEAS - LVPWD: 1.19 CM
BH CV ECHO MEAS - MED PEAK E' VEL: 4.1 CM/SEC
BH CV ECHO MEAS - MV A MAX VEL: 124.3 CM/SEC
BH CV ECHO MEAS - MV DEC SLOPE: 341.8 CM/SEC2
BH CV ECHO MEAS - MV DEC TIME: 0.19 MSEC
BH CV ECHO MEAS - MV E MAX VEL: 65.5 CM/SEC
BH CV ECHO MEAS - MV E/A: 0.53
BH CV ECHO MEAS - MV MEAN PG: 2.04 MMHG
BH CV ECHO MEAS - MV P1/2T: 69.8 MSEC
BH CV ECHO MEAS - MV V2 VTI: 27.7 CM
BH CV ECHO MEAS - MVA(P1/2T): 3.2 CM2
BH CV ECHO MEAS - MVA(VTI): 2.7 CM2
BH CV ECHO MEAS - PA ACC TIME: 0.1 SEC
BH CV ECHO MEAS - PA PR(ACCEL): 32.7 MMHG
BH CV ECHO MEAS - PA V2 MAX: 101.8 CM/SEC
BH CV ECHO MEAS - QP/QS: 1.32
BH CV ECHO MEAS - RV V1 VTI: 16.6 CM
BH CV ECHO MEAS - RVOT DIAM: 2.8 CM
BH CV ECHO MEAS - SI(MOD-SP2): 7.8 ML/M2
BH CV ECHO MEAS - SI(MOD-SP4): 22.1 ML/M2
BH CV ECHO MEAS - SV(LVOT): 74.6 ML
BH CV ECHO MEAS - SV(MOD-SP2): 13.4 ML
BH CV ECHO MEAS - SV(MOD-SP4): 38.2 ML
BH CV ECHO MEAS - SV(RVOT): 98.5 ML
BH CV ECHO MEAS - TAPSE (>1.6): 1.84 CM
BH CV ECHO MEASUREMENTS AVERAGE E/E' RATIO: 10.92
BH CV XLRA - RV BASE: 2.9 CM
BH CV XLRA - RV LENGTH: 5.7 CM
BH CV XLRA - RV MID: 2.7 CM
BH CV XLRA - TDI S': 12.6 CM/SEC
LEFT ATRIUM VOLUME INDEX: 27.7 ML/M2
MAXIMAL PREDICTED HEART RATE: 142 BPM
STRESS TARGET HR: 121 BPM

## 2022-05-18 NOTE — TELEPHONE ENCOUNTER
I called her about her echo results and her carotid results.  Her echo now shows a slight decrease in function and she is more short winded than she has been prior.  She thinks that maybe this is just due to having had COVID but she cannot seem to get over this.  Given the changes, I am going to go ahead and set up for coronary CTA.

## 2022-06-02 ENCOUNTER — TELEPHONE (OUTPATIENT)
Dept: CARDIOLOGY | Facility: CLINIC | Age: 77
End: 2022-06-02

## 2022-06-02 RX ORDER — METOPROLOL TARTRATE 50 MG/1
50 TABLET, FILM COATED ORAL 2 TIMES DAILY
Qty: 2 TABLET | Refills: 0 | Status: SHIPPED | OUTPATIENT
Start: 2022-06-02 | End: 2022-07-06

## 2022-06-02 NOTE — TELEPHONE ENCOUNTER
Patient called and stated that she got a call from  stating that her HR was too and she may need meds to bring her HR down for the CT angio that is scheduled for 6/15/22.  Please advise.    CB: [phone]    Thanks,  Vaishali

## 2022-06-13 RX ORDER — ROSUVASTATIN CALCIUM 20 MG/1
TABLET, COATED ORAL
Qty: 90 TABLET | Refills: 1 | Status: SHIPPED | OUTPATIENT
Start: 2022-06-13 | End: 2022-09-09

## 2022-06-15 ENCOUNTER — HOSPITAL ENCOUNTER (OUTPATIENT)
Dept: CT IMAGING | Facility: HOSPITAL | Age: 77
Discharge: HOME | End: 2022-06-15
Admitting: INTERNAL MEDICINE

## 2022-06-15 VITALS
TEMPERATURE: 97.6 F | SYSTOLIC BLOOD PRESSURE: 136 MMHG | DIASTOLIC BLOOD PRESSURE: 67 MMHG | OXYGEN SATURATION: 98 % | HEART RATE: 64 BPM | RESPIRATION RATE: 16 BRPM

## 2022-06-15 DIAGNOSIS — E78.2 MIXED HYPERLIPIDEMIA: ICD-10-CM

## 2022-06-15 DIAGNOSIS — R94.30 ABNORMAL RESULT OF CARDIOVASCULAR FUNCTION STUDY, UNSPECIFIED: ICD-10-CM

## 2022-06-15 DIAGNOSIS — R06.09 DYSPNEA ON EXERTION: ICD-10-CM

## 2022-06-15 DIAGNOSIS — I42.9 CARDIOMYOPATHY, UNSPECIFIED TYPE: ICD-10-CM

## 2022-06-15 LAB
CREAT BLDA-MCNC: 0.8 MG/DL (ref 0.6–1.3)
QT INTERVAL: 427 MS

## 2022-06-15 PROCEDURE — 75574 CT ANGIO HRT W/3D IMAGE: CPT | Performed by: INTERNAL MEDICINE

## 2022-06-15 PROCEDURE — 0 IOPAMIDOL PER 1 ML: Performed by: INTERNAL MEDICINE

## 2022-06-15 PROCEDURE — 82565 ASSAY OF CREATININE: CPT

## 2022-06-15 PROCEDURE — 93005 ELECTROCARDIOGRAM TRACING: CPT | Performed by: INTERNAL MEDICINE

## 2022-06-15 PROCEDURE — 75574 CT ANGIO HRT W/3D IMAGE: CPT

## 2022-06-15 RX ADMIN — IOPAMIDOL 100 ML: 755 INJECTION, SOLUTION INTRAVENOUS at 13:33

## 2022-06-15 NOTE — PROGRESS NOTES
Dr. Lane is out of the office today & I am covering her in basket.      Please call patient. Kidney function normal. Thanks.

## 2022-06-16 ENCOUNTER — DOCUMENTATION (OUTPATIENT)
Dept: CARDIOLOGY | Facility: CLINIC | Age: 77
End: 2022-06-16

## 2022-06-16 NOTE — PROGRESS NOTES
Cardiac CTA with morphology  6/15/22  Reason for the exam: CP    Calcium score is 5 Agatston units.  This is between the 1-25 percentile for women between the ages of 75-79 years old.    Heart rate 60 bpm.  Left ventricular end-diastolic volume 127 mL.  Left ventricular end-systolic volume 65 mL.  Ejection fraction 49%.  Stroke volume 62 mL.  Cardiac output 3705 mL/m    The right atrium is normal in size.  The right ventricle is normal in size.  There is grossly normal right ventricular systolic function.  The pulmonary artery is normal in size.  There are 4 pulmonary veins which enter the left atrium in their expected location.  The left atrial appendage was not well visualized.  The intra-atrial septum appeared to be intact.  The left ventricle is normal in size with normal systolic function.  There was no evidence of a left ventricular thrombus.  The intraventricular septum appeared to be intact.  The mitral valve appeared structurally normal.  The aortic valve was trileaflet and appears structurally and functionally normal.  The pulmonic valve appeared structurally normal.  The tricuspid valve appeared structurally normal.  There was no pericardial effusion.  The pericardium appeared normal.    The left main coronary artery came off the left coronary cusp in its anticipated location.  It bifurcated into the left anterior descending artery and the circumflex coronary artery.  There was no evidence of atherosclerotic disease of the left main coronary artery.  Left anterior descending artery wraps around the apex of the heart.  There is no evidence of atherosclerotic disease.  The circumflex artery is the nondominant vessel with no evidence of atherosclerotic disease.  The right coronary artery is the dominant vessel with no evidence of atherosclerotic disease.    Conclusions:  1.  Normal coronary artery anatomy without evidence of significant atherosclerotic disease.  Calcium score is 5 Agatston units.  2.   Structurally normal heart.    Heidi Ray MD  06/16/22

## 2022-06-17 NOTE — PROGRESS NOTES
Awaiting cardiac findings.  Mammo, breast US and DEXA referral.  Please mail to pt.  She will have them done at [facility].  THANKS

## 2022-06-21 ENCOUNTER — TELEPHONE (OUTPATIENT)
Dept: CARDIOLOGY | Facility: CLINIC | Age: 77
End: 2022-06-21

## 2022-06-21 NOTE — TELEPHONE ENCOUNTER
Patient called and would the results to the cardiac CTA that she had done on 6/15/22.  Please advise.    CB: [phone]    Thanks,  Vaishali

## 2022-06-21 NOTE — TELEPHONE ENCOUNTER
Triage RN:    Please let patient know that the CT scan of the chest was also normal.  Please see recommendations below from Jeanette Fernandez about the cardiac portion.  Thank you.

## 2022-06-22 NOTE — TELEPHONE ENCOUNTER
My last note entered was in error.    Attempted to call patient, no answer.  Left a voicemail for patient to call back.  Will continue to try to reach patient.    Please see entire message from both Mara and Jeanette.    Candida Swain RN  [facility] Triage  06/22/22 10:59 EDT

## 2022-06-23 ENCOUNTER — LAB (OUTPATIENT)
Dept: LAB | Facility: HOSPITAL | Age: 77
End: 2022-06-23

## 2022-06-23 ENCOUNTER — TRANSCRIBE ORDERS (OUTPATIENT)
Dept: ADMINISTRATIVE | Facility: HOSPITAL | Age: 77
End: 2022-06-23

## 2022-06-23 DIAGNOSIS — R00.0 TACHYCARDIA: Primary | ICD-10-CM

## 2022-06-23 DIAGNOSIS — E03.9 HYPOTHYROIDISM, UNSPECIFIED TYPE: Primary | ICD-10-CM

## 2022-06-23 DIAGNOSIS — E03.9 ACQUIRED HYPOTHYROIDISM: ICD-10-CM

## 2022-06-23 DIAGNOSIS — E03.9 HYPOTHYROIDISM, UNSPECIFIED TYPE: ICD-10-CM

## 2022-06-23 LAB — TSH SERPL DL<=0.05 MIU/L-ACNC: 0.69 UIU/ML (ref 0.27–4.2)

## 2022-06-23 PROCEDURE — 84443 ASSAY THYROID STIM HORMONE: CPT

## 2022-06-23 PROCEDURE — 36415 COLL VENOUS BLD VENIPUNCTURE: CPT

## 2022-06-23 RX ORDER — LEVOTHYROXINE SODIUM 0.05 MG/1
50 TABLET ORAL DAILY
Qty: 30 TABLET | Refills: 6 | Status: SHIPPED | OUTPATIENT
Start: 2022-06-23 | End: 2023-03-27

## 2022-06-23 NOTE — TELEPHONE ENCOUNTER
Reviewed recommendations with Codie Munson and the patient verbalized understanding of the recommendations.    Transferred patient to scheduling to arrange for Holter monitor placement.    Thank you,  Giana Dc RN  Triage Nurse [facility]

## 2022-06-23 NOTE — TELEPHONE ENCOUNTER
Codie Munson returned call.    Reviewed results and recommendations with patient.  Her questions were answered and she verbalized understanding.    Patient reports she only took metoprolol for her testing and was not regularly taking it.  Patient did express concern about her heart rate saying that it varies from day to day, with readings from  bpm while at rest.  Patient stated if you recommend her to stay on the metoprolol, she will need prescription sent in.    /71, HR 76 (yesterday morning)  In the afternoon, patient reports heart was 99.    Please let me know how you would like to proceed.    Thank you,  Giana Dc, RN  Triage Nurse [facility] [place]

## 2022-06-23 NOTE — TELEPHONE ENCOUNTER
My mistake about the metoprolol. OK to stay off it for now. However, I would like for her to wear a 48-hour holter monitor so I can see what her HR is averaging. We can then make a decision about whether the metoprolol needs to be continued.     Order for holter monitor is placed.     Thanks!  MARISEL Perez

## 2022-06-23 NOTE — TELEPHONE ENCOUNTER
Attempted to call patient, no answer.  Left a voicemail for patient to call back.  Will continue to try to reach patient.    Please see entire message from both Mara and Jeanette.      Candida Swain RN  [facility] Triage  06/23/22 10:11 EDT

## 2022-06-29 DIAGNOSIS — Z12.31 SCREENING MAMMOGRAM FOR BREAST CANCER: Primary | ICD-10-CM

## 2022-07-06 ENCOUNTER — TELEPHONE (OUTPATIENT)
Dept: CARDIOLOGY | Facility: CLINIC | Age: 77
End: 2022-07-06

## 2022-07-06 RX ORDER — METOPROLOL SUCCINATE 25 MG/1
25 TABLET, EXTENDED RELEASE ORAL DAILY
Qty: 30 TABLET | Refills: 11 | Status: SHIPPED | OUTPATIENT
Start: 2022-07-06 | End: 2022-07-15

## 2022-07-06 NOTE — TELEPHONE ENCOUNTER
Can you schedule Ms. Munson to have BP check on a Friday in [place] in about 2 weeks?    Thanks!  MARISEL Perez

## 2022-07-11 ENCOUNTER — TELEPHONE (OUTPATIENT)
Dept: CARDIOLOGY | Facility: CLINIC | Age: 77
End: 2022-07-11

## 2022-07-11 NOTE — TELEPHONE ENCOUNTER
Patient called and stated that since she starting taking the Metoprolol she has noticed that her feet and legs are swelling and she said that the (L) ankle is the worse.  She would like to know what to do.  Please advise.    CB: [phone]    Thanks,  Vaishali

## 2022-07-12 NOTE — TELEPHONE ENCOUNTER
Scheduled for 9:30 at the [facility] on 7/15/22.  Patient is aware and verbalized understanding.    Vaishali

## 2022-07-15 ENCOUNTER — OFFICE VISIT (OUTPATIENT)
Dept: CARDIOLOGY | Facility: CLINIC | Age: 77
End: 2022-07-15

## 2022-07-15 VITALS
WEIGHT: 141 LBS | RESPIRATION RATE: 16 BRPM | HEART RATE: 55 BPM | DIASTOLIC BLOOD PRESSURE: 90 MMHG | OXYGEN SATURATION: 99 % | BODY MASS INDEX: 24.07 KG/M2 | SYSTOLIC BLOOD PRESSURE: 150 MMHG | HEIGHT: 64 IN

## 2022-07-15 DIAGNOSIS — I83.813 VARICOSE VEINS OF BOTH LOWER EXTREMITIES WITH PAIN: ICD-10-CM

## 2022-07-15 DIAGNOSIS — E78.2 MIXED HYPERLIPIDEMIA: ICD-10-CM

## 2022-07-15 DIAGNOSIS — N36.8 PROLAPSE OF URETHRA: ICD-10-CM

## 2022-07-15 DIAGNOSIS — K21.9 GASTRO-ESOPHAGEAL REFLUX DISEASE WITHOUT ESOPHAGITIS: ICD-10-CM

## 2022-07-15 DIAGNOSIS — I49.3 PVC (PREMATURE VENTRICULAR CONTRACTION): ICD-10-CM

## 2022-07-15 DIAGNOSIS — I42.8 NON-ISCHEMIC CARDIOMYOPATHY: Primary | ICD-10-CM

## 2022-07-15 PROCEDURE — 99214 OFFICE O/P EST MOD 30 MIN: CPT | Performed by: NURSE PRACTITIONER

## 2022-07-15 PROCEDURE — 93000 ELECTROCARDIOGRAM COMPLETE: CPT | Performed by: NURSE PRACTITIONER

## 2022-07-15 RX ORDER — METOPROLOL SUCCINATE 25 MG/1
12.5 TABLET, EXTENDED RELEASE ORAL DAILY
Qty: 45 TABLET | Refills: 3 | Status: SHIPPED | OUTPATIENT
Start: 2022-07-15 | End: 2022-08-01 | Stop reason: SINTOL

## 2022-07-15 NOTE — PROGRESS NOTES
[facility] CARDIOLOGY  [address]  [address]  [place] [address]  Phone: [phone]  Fax: [phone]      Patient Name: Codie Munson  :1945  Age: 76 y.o.  Primary Cardiologist: Anamika Lane MD  Encounter Provider:  MARISEL Rebollar      Chief Complaint     Chief Complaint: Fatigue    SUBJECTIVE     History of Present Illness:  Codie Munson is a 76 y.o.  female whose medical history includes lower extremity venous insufficiency, hyperlipidemia, and urethral prolapse (bladder suspension and pessary). She is followed in our office by Dr. Lane for non-ischemic cardiomyopathy.     07/15/22 Follow-up:  She is here for follow-up of testing and I am seeing her for the first time today. Her echocardiogram for dyspnea showed a mild drop in EF and hypokinetic wall segments; a coronary CTA was ordered which showed no significant cardiac atherosclerotic disease and calcium score of 5. She called to state that her HR had been elevated. A 48-hour holter monitor showed sinus rhythm but 2 episodes of SVT and occasional PVCs. She was started on 25 mg Toprol nightly. Since that time, she has felt fatigued. She works 3 hours a day at [employer] and now has to nap during the day; she has not had to do this before. Her BP at home has been 100-110s with HR 50-60. She denies chest pain, lightheadedness, leg swelling, palpitations, or orthopnea. Her dyspnea has improved.     Below is a summary of pertinent cardiology findings:  •  Cardiac catheterization () for dizziness, palpitations, and SOA: no significant CAD and normal LVEF. Echocardiogram showed EF 40%. Bilateral carotid artery studies showed minimal disease of left carotid artery; normal bilateral carotid duplex in 2010.  • Reflux of lesser and greater saphenous veins and sees Dr. Sayda Perrin; vein stripping on left leg.   • 2016 Echocardiogram showed EF 60%,  grade I diastolic dysfunction, normal wall motion, and no significant valve disease.  • 2017 nonischemic stress nuclear study and vascular screening.   • COVID-19 infection 2022.  • 2022 mild atheromatous plaque in carotid vessels but no hemodynamically significant stenosis.  • 2022 Echocardiogram showed EF 46-50%, hypokinetic apical anterior, apical septal, and mid anteroseptal wall segments, grade I diastolic dysfunction, sigmoid shaped ventricular septum.   • 2022 Cardia CTA showed normal coronary artery anatomy without evidence of significant atherosclerotic disease and calcium score of 5.   • 2022 48-hour holter monitor showed NSR with average HR 86, 2 episodes of SVT lasting 4 beats, and occasional PVCs occurring 1.75% of the study.     Past Medical History:   Diagnosis Date   • Cardiomyopathy (HCC)    • Chronic cystitis    • Cough    • Depression    • GERD (gastroesophageal reflux disease)    • Hyperlipidemia    • Hypertension    • Scarlet fever    • Strain of thoracic region    • Urethral prolapse    • URI (upper respiratory infection)    • Urinary retention          Past Surgical History:   Procedure Laterality Date   • APPENDECTOMY     • BLADDER SURGERY     • CARDIAC CATHETERIZATION      Normal. Performed at [facility].   •  SECTION     • VEIN LIGATION AND STRIPPING           Social History     Socioeconomic History   • Marital status:    Tobacco Use   • Smoking status: Never Smoker   • Smokeless tobacco: Never Used   • Tobacco comment: drink caffiene daily   Vaping Use   • Vaping Use: Never used   Substance and Sexual Activity   • Alcohol use: No   • Drug use: Defer   • Sexual activity: Defer         Review of Systems     Review of Systems   Constitutional: Positive for malaise/fatigue.   Cardiovascular: Negative.          Medications     Allergies as of 07/15/2022 - Reviewed 07/15/2022   Allergen Reaction Noted   • Augmentin [amoxicillin-pot  "clavulanate] Rash 02/19/2016   • Biaxin [clarithromycin] Rash 02/19/2016         Current Outpatient Medications:   •  Cholecalciferol (VITAMIN D3) 2000 units tablet, Take  by mouth., Disp: , Rfl:   •  conjugated estrogens (PREMARIN) 0.625 MG/GM vaginal cream, USE A PEA-SIZED AMOUNT ON FINGERTIP AND PLACE IN THE VAGINAL OPENING 3 X PER WEEK., Disp: , Rfl:   •  esomeprazole (NexIUM) 40 MG capsule, Take 40 mg by mouth every morning before breakfast., Disp: , Rfl:   •  fluticasone (FLONASE) 50 MCG/ACT nasal spray, 2 sprays into the nostril(s) as directed by provider As Needed., Disp: , Rfl:   •  levothyroxine (Synthroid) 50 MCG tablet, Take 1 tablet by mouth Daily., Disp: 30 tablet, Rfl: 6  •  metoprolol succinate XL (TOPROL-XL) 25 MG 24 hr tablet, Take 0.5 tablets by mouth Daily., Disp: 45 tablet, Rfl: 3  •  rosuvastatin (CRESTOR) 10 MG tablet, TAKE ONE TABLET BY MOUTH DAILY, Disp: 90 tablet, Rfl: 0  •  rosuvastatin (CRESTOR) 20 MG tablet, TAKE ONE TABLET BY MOUTH ONCE NIGHTLY, Disp: 90 tablet, Rfl: 1        OBJECTIVE     Vital Signs:   /90   Pulse 55   Resp 16   Ht 162.6 cm (64\")   Wt 64 kg (141 lb)   SpO2 99%   BMI 24.20 kg/m²       Weight:  Wt Readings from Last 3 Encounters:   07/15/22 64 kg (141 lb)   05/17/22 68 kg (149 lb 14.6 oz)   05/11/22 68 kg (150 lb)     Body mass index is 24.2 kg/m².        Physical Exam     Physical Exam  Constitutional:       General: She is not in acute distress.  HENT:      Head: Normocephalic and atraumatic.      Mouth/Throat:      Mouth: Mucous membranes are moist.   Eyes:      General: No scleral icterus.     Extraocular Movements: Extraocular movements intact.      Conjunctiva/sclera: Conjunctivae normal.      Pupils: Pupils are equal, round, and reactive to light.   Cardiovascular:      Rate and Rhythm: Regular rhythm. Bradycardia present.      Pulses: Normal pulses.      Heart sounds: S1 normal and S2 normal.   Pulmonary:      Effort: No respiratory distress.      " Breath sounds: Normal breath sounds. No wheezing, rhonchi or rales.   Abdominal:      General: Bowel sounds are normal. There is no distension.      Palpations: Abdomen is soft.      Tenderness: There is no abdominal tenderness.   Musculoskeletal:         General: Normal range of motion.      Cervical back: Normal range of motion and neck supple.      Right lower leg: No edema.      Left lower leg: No edema.   Skin:     General: Skin is warm and dry.      Coloration: Skin is not jaundiced.   Neurological:      Mental Status: She is alert and oriented to person, place, and time.   Psychiatric:         Mood and Affect: Mood normal.         Reviewed Data     Result Review :  The following data was reviewed by MARISEL Rebollar on 07/15/22:  • Labs 04.28.2022: Cr 0.9, K 4.0, otherwise unremarkable CMP, TSH 4.550, free T4 7.68, Tchol 184, HDL 47, LDL 95, trig 247, hgb 13.3, plt 364.          ECG 12 Lead    Date/Time: 7/15/2022 1:00 PM  Performed by: Shanna Fernandez APRN  Authorized by: Shanna Fernandez APRN   Comparison: compared with previous ECG from 8/17/2018  Similar to previous ECG  Rhythm: sinus rhythm and sinus bradycardia  Rate: normal  BPM: 56  T inversion: III, V5 and V6  T flattening: aVF, V3 and V4    Clinical impression: abnormal EKG            Assessment and Plan        Assessment and Plan     Assessment:  1. Non-ischemic cardiomyopathy (HCC)    2. PVC (premature ventricular contraction)    3. Mixed hyperlipidemia    4. Varicose veins of both lower extremities with pain    5. Prolapse of urethra    6. Gastro-esophageal reflux disease without esophagitis         1. Nonischemic cardiomyopathy:  first diagnosed in 2000, with EF 40% but normal coronary arteries.  Echocardiogram in August 2016 showed EF 60%.  Echocardiogram May 18, 2020 showed EF 46- 50% and hypokinetic LV wall segments.  Cardiac CTA showed no significant coronary artery disease with calcium score of 5.  She  is on 10 mg rosuvastatin daily and Toprol daily.  2. PVCs: Noted on 48-hour Holter monitor from June 2022.  PVCs occurred 1.75% of the study time.  She was started on Toprol daily but now is experiencing fatigue and lower blood pressures.  3. Hyperlipidemia: Lipids were controlled when checked in April 2022 except for elevated triglyceride; her rosuvastatin was increased to 30 mg daily.  4. Venous insufficiency: She follows with Dr. Perrin.  5. Urethral prolapse: She has had bladder suspension and uses a pessary.  6. GERD: She is treated with Nexium daily.    Plan:  1. I will reduce her metoprolol succinate to 12.5 mg nightly.  2. I will call her in 2 weeks to check blood pressure, heart rate, and fatigue.  3. She will follow-up with Dr. Lane in May 2022.    Addendum 07/29/22:  Having pins and needles feeling and itching. BP 110s/60-80s with HR 60-70s. Will stop Toprol for a few days and see if symptoms resolve. Advised to call PCP as well.     Follow Up:  No follow-ups on file.  Orders Placed This Encounter   Procedures   • ECG 12 Lead          I appreciate the opportunity to participate in this patient's care.    Thank you,  MARISEL Abernathy

## 2022-07-28 RX ORDER — ROSUVASTATIN CALCIUM 10 MG/1
TABLET, COATED ORAL
Qty: 90 TABLET | Refills: 0 | Status: SHIPPED | OUTPATIENT
Start: 2022-07-28 | End: 2022-09-06 | Stop reason: HOSPADM

## 2022-07-29 ENCOUNTER — TELEPHONE (OUTPATIENT)
Dept: FAMILY MEDICINE CLINIC | Facility: CLINIC | Age: 77
End: 2022-07-29

## 2022-07-29 NOTE — TELEPHONE ENCOUNTER
Please notify patient that she has been on the thyroid medication for the past 7 months.  I can find no drug to drug interaction between the Toprol and the thyroid medication.  Has she tried any new lotions, detergents, soaps or foods?  Or has she been doing any yard work or gardening?

## 2022-07-29 NOTE — TELEPHONE ENCOUNTER
Pt has not tried anything new, she said this started since taking the metoprolol and will wait until they call her next week to follow up.

## 2022-07-29 NOTE — TELEPHONE ENCOUNTER
Spoke w/ patient she said she is not taking the metoprolol since cardiology advised to stop it until Monday, she said she is itching all over and read that her thyroid medication can cause this. She wants to know if this could be an interaction between the two and advise.

## 2022-07-29 NOTE — TELEPHONE ENCOUNTER
Caller: Camille Johnson    Relationship: Self    Best call back number: [phone] (H)    What is the best time to reach you: ANYTIME, ASAP    Who are you requesting to speak with (clinical staff, provider,  specific staff member): CLINICAL STAFF/ HAI LAY    Do you know the name of the person who called: CAMILLE JOHNSON    What was the call regarding: PATIENT IS ASKING IF HER THYROID MEDICATION CAN INTERACT WITH THE MEDICATION METOPROLOL- PATIENT STATES SHE HAS BEEN FEELING LIKE SHE HAS HAD NEEDLES ALL OVER HER FOR THE LAST 8 DAYS AND WONDERS IF THIS IS AN INTERACTION, PLEASE ADVISE PATIENT ASAP    PATIENT WAS ADVISED TO GO OFF OF THE METOPROLOL BY HER CARDIOLOGIST FOR 2-3 DAYS TO SEE IF THAT HELPS THE SIDE EFFECTS AS WELL AND THEY WILL MONITOR HER FOR HER HEART RATE ETC    Do you require a callback: YES, ASAP

## 2022-08-01 ENCOUNTER — TELEPHONE (OUTPATIENT)
Dept: CARDIOLOGY | Facility: CLINIC | Age: 77
End: 2022-08-01

## 2022-08-01 RX ORDER — CARVEDILOL 3.12 MG/1
3.12 TABLET ORAL 2 TIMES DAILY
Qty: 60 TABLET | Refills: 11 | Status: SHIPPED | OUTPATIENT
Start: 2022-08-01

## 2022-08-01 NOTE — TELEPHONE ENCOUNTER
----- Message from MARISEL Rebollar sent at 7/29/2022 12:29 PM EDT -----  Itching; pins and needles. Hold Toprol.     ----- Message -----  From: Shanna Fernandez APRN  Sent: 7/29/2022  12:00 AM EDT  To: MARISEL Rebollar    Lower Toprol; BP/HR and fatigue

## 2022-08-01 NOTE — TELEPHONE ENCOUNTER
I called her last week and she complained of itching and pins and needles feeling. I had her hold Toprol over the weekend.     Can you call and check on her symptoms?    Thanks!  MARISEL Perez

## 2022-08-01 NOTE — TELEPHONE ENCOUNTER
Jeanette,    I called and spoke with Codie.  She reports her symptoms are significantly better off of Toprol.  She reports the itching has decreased and pins/needles feeling is basically gone. Her fatigue has improved and she feels more like herself.  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      She spoke with her PCP and they do not believe her symptoms are being caused by thyroid or levothyroxine.     Patient is concerned about her HR.  Last night it was 101 with /83.  Early this AM HR was 68 /71.  At 1145 today it was 114/81 .  She is not symptomatic with her heart rate, the numbers just scare her.  She doesn't like her heart rate being that fast.  I reassured her that  generally normal, but we would be more concerned if she had symptoms such as CP, SOA, or dizziness.  She verbalizes understanding.    She was inquiring that her  takes metoprolol tartrate BID, and she would be willing to try that if you felt like she would not have the same symptoms.  I told her I would ask you and let her know if we have any other recommendations.    Thank you,  Candida Swain RN  [facility] Triage  08/01/22 13:07 EDT

## 2022-08-01 NOTE — TELEPHONE ENCOUNTER
I sent in 3.125 mg carvedilol BID. Let's try this to help with BP and HR. I will check on her next week.    Thanks!  MARISEL Perez

## 2022-08-01 NOTE — TELEPHONE ENCOUNTER
Called and spoke with the patient about your recommendations.  She wrote down the instructions and read back for confirmation.  She verbalizes understanding.    Candida Swain RN  [facility] Triage  08/01/22 13:51 EDT

## 2022-08-02 NOTE — TELEPHONE ENCOUNTER
Pt's pharmacy notified her that metoprolol was ready for her to pickup and she called to confirm that this was discontinued.     I confirmed that metoprolol was discontinued and explained that they may have refilled it before it was discontinued. She verbalized understanding and informed me that her carvedilol would be ready for pickup today. No further questions at this time.

## 2022-08-04 ENCOUNTER — APPOINTMENT (OUTPATIENT)
Dept: WOMENS IMAGING | Facility: HOSPITAL | Age: 77
End: 2022-08-04

## 2022-08-04 PROCEDURE — 77063 BREAST TOMOSYNTHESIS BI: CPT | Performed by: RADIOLOGY

## 2022-08-04 PROCEDURE — 77067 SCR MAMMO BI INCL CAD: CPT | Performed by: RADIOLOGY

## 2022-08-17 ENCOUNTER — TELEPHONE (OUTPATIENT)
Dept: CARDIOLOGY | Facility: CLINIC | Age: 77
End: 2022-08-17

## 2022-08-17 NOTE — TELEPHONE ENCOUNTER
----- Message from MARISEL Rebollar sent at 8/1/2022  1:18 PM EDT -----  Pins and needles on carvedilol. TMM

## 2022-08-18 NOTE — TELEPHONE ENCOUNTER
Pt states that her B/p this am was 127/84  HR : 69 .    Pt states that she is feeling great. she states that the Carvedilol is working really good for her . She has no problems at this time .

## 2022-09-04 ENCOUNTER — APPOINTMENT (OUTPATIENT)
Dept: ULTRASOUND IMAGING | Facility: HOSPITAL | Age: 77
End: 2022-09-04

## 2022-09-04 ENCOUNTER — ANESTHESIA (OUTPATIENT)
Dept: PERIOP | Facility: HOSPITAL | Age: 77
End: 2022-09-04

## 2022-09-04 ENCOUNTER — ANESTHESIA EVENT (OUTPATIENT)
Dept: PERIOP | Facility: HOSPITAL | Age: 77
End: 2022-09-04

## 2022-09-04 ENCOUNTER — APPOINTMENT (OUTPATIENT)
Dept: GENERAL RADIOLOGY | Facility: HOSPITAL | Age: 77
End: 2022-09-04

## 2022-09-04 ENCOUNTER — APPOINTMENT (OUTPATIENT)
Dept: CT IMAGING | Facility: HOSPITAL | Age: 77
End: 2022-09-04

## 2022-09-04 ENCOUNTER — HOSPITAL ENCOUNTER (OUTPATIENT)
Facility: HOSPITAL | Age: 77
Discharge: HOME | End: 2022-09-06
Attending: EMERGENCY MEDICINE | Admitting: SURGERY

## 2022-09-04 DIAGNOSIS — E78.5 HYPERLIPIDEMIA, UNSPECIFIED HYPERLIPIDEMIA TYPE: ICD-10-CM

## 2022-09-04 DIAGNOSIS — K81.0 ACUTE CHOLECYSTITIS: Primary | ICD-10-CM

## 2022-09-04 DIAGNOSIS — E03.9 HYPOTHYROIDISM, UNSPECIFIED TYPE: ICD-10-CM

## 2022-09-04 DIAGNOSIS — Z86.79 HISTORY OF CARDIOMYOPATHY: ICD-10-CM

## 2022-09-04 PROBLEM — I42.9 CARDIOMYOPATHY (HCC): Status: ACTIVE | Noted: 2022-09-04

## 2022-09-04 PROBLEM — E03.4 HYPOTHYROIDISM DUE TO ACQUIRED ATROPHY OF THYROID: Status: ACTIVE | Noted: 2022-09-04

## 2022-09-04 PROBLEM — I10 ESSENTIAL HYPERTENSION: Status: ACTIVE | Noted: 2022-09-04

## 2022-09-04 PROBLEM — I50.43 ACUTE ON CHRONIC COMBINED SYSTOLIC AND DIASTOLIC CHF (CONGESTIVE HEART FAILURE) (HCC): Status: ACTIVE | Noted: 2022-09-04

## 2022-09-04 LAB
ALBUMIN SERPL-MCNC: 4.3 G/DL (ref 3.5–5.2)
ALBUMIN/GLOB SERPL: 1.7 G/DL
ALP SERPL-CCNC: 98 U/L (ref 39–117)
ALT SERPL W P-5'-P-CCNC: 16 U/L (ref 1–33)
ANION GAP SERPL CALCULATED.3IONS-SCNC: 8.8 MMOL/L (ref 5–15)
ANION GAP SERPL CALCULATED.3IONS-SCNC: 9.8 MMOL/L (ref 5–15)
AST SERPL-CCNC: 16 U/L (ref 1–32)
BACTERIA UR QL AUTO: ABNORMAL /HPF
BASOPHILS # BLD AUTO: 0.07 10*3/MM3 (ref 0–0.2)
BASOPHILS NFR BLD AUTO: 0.6 % (ref 0–1.5)
BILIRUB SERPL-MCNC: 0.3 MG/DL (ref 0–1.2)
BILIRUB UR QL STRIP: NEGATIVE
BUN SERPL-MCNC: 11 MG/DL (ref 8–23)
BUN SERPL-MCNC: 14 MG/DL (ref 8–23)
BUN/CREAT SERPL: 13.3 (ref 7–25)
BUN/CREAT SERPL: 15.1 (ref 7–25)
CALCIUM SPEC-SCNC: 8.5 MG/DL (ref 8.6–10.5)
CALCIUM SPEC-SCNC: 9 MG/DL (ref 8.6–10.5)
CHLORIDE SERPL-SCNC: 106 MMOL/L (ref 98–107)
CHLORIDE SERPL-SCNC: 110 MMOL/L (ref 98–107)
CLARITY UR: ABNORMAL
CO2 SERPL-SCNC: 25.2 MMOL/L (ref 22–29)
CO2 SERPL-SCNC: 25.2 MMOL/L (ref 22–29)
COLOR UR: YELLOW
CREAT SERPL-MCNC: 0.83 MG/DL (ref 0.57–1)
CREAT SERPL-MCNC: 0.93 MG/DL (ref 0.57–1)
DEPRECATED RDW RBC AUTO: 44.7 FL (ref 37–54)
EGFRCR SERPLBLD CKD-EPI 2021: 62.6 ML/MIN/1.73
EGFRCR SERPLBLD CKD-EPI 2021: 71.8 ML/MIN/1.73
EOSINOPHIL # BLD AUTO: 0.28 10*3/MM3 (ref 0–0.4)
EOSINOPHIL NFR BLD AUTO: 2.2 % (ref 0.3–6.2)
ERYTHROCYTE [DISTWIDTH] IN BLOOD BY AUTOMATED COUNT: 14.1 % (ref 12.3–15.4)
GLOBULIN UR ELPH-MCNC: 2.5 GM/DL
GLUCOSE SERPL-MCNC: 121 MG/DL (ref 65–99)
GLUCOSE SERPL-MCNC: 126 MG/DL (ref 65–99)
GLUCOSE UR STRIP-MCNC: NEGATIVE MG/DL
HCT VFR BLD AUTO: 40.1 % (ref 34–46.6)
HGB BLD-MCNC: 12.7 G/DL (ref 12–15.9)
HGB UR QL STRIP.AUTO: ABNORMAL
HYALINE CASTS UR QL AUTO: ABNORMAL /LPF
IMM GRANULOCYTES # BLD AUTO: 0.04 10*3/MM3 (ref 0–0.05)
IMM GRANULOCYTES NFR BLD AUTO: 0.3 % (ref 0–0.5)
KETONES UR QL STRIP: ABNORMAL
LEUKOCYTE ESTERASE UR QL STRIP.AUTO: ABNORMAL
LIPASE SERPL-CCNC: 30 U/L (ref 13–60)
LYMPHOCYTES # BLD AUTO: 1.86 10*3/MM3 (ref 0.7–3.1)
LYMPHOCYTES NFR BLD AUTO: 14.9 % (ref 19.6–45.3)
MAGNESIUM SERPL-MCNC: 2 MG/DL (ref 1.6–2.4)
MCH RBC QN AUTO: 27.6 PG (ref 26.6–33)
MCHC RBC AUTO-ENTMCNC: 31.7 G/DL (ref 31.5–35.7)
MCV RBC AUTO: 87.2 FL (ref 79–97)
MONOCYTES # BLD AUTO: 0.63 10*3/MM3 (ref 0.1–0.9)
MONOCYTES NFR BLD AUTO: 5.1 % (ref 5–12)
NEUTROPHILS NFR BLD AUTO: 76.9 % (ref 42.7–76)
NEUTROPHILS NFR BLD AUTO: 9.58 10*3/MM3 (ref 1.7–7)
NITRITE UR QL STRIP: NEGATIVE
NRBC BLD AUTO-RTO: 0 /100 WBC (ref 0–0.2)
PH UR STRIP.AUTO: <=5 [PH] (ref 5–8)
PLATELET # BLD AUTO: 303 10*3/MM3 (ref 140–450)
PMV BLD AUTO: 9.8 FL (ref 6–12)
POTASSIUM SERPL-SCNC: 4.2 MMOL/L (ref 3.5–5.2)
POTASSIUM SERPL-SCNC: 4.6 MMOL/L (ref 3.5–5.2)
PROT SERPL-MCNC: 6.8 G/DL (ref 6–8.5)
PROT UR QL STRIP: NEGATIVE
QT INTERVAL: 401 MS
RBC # BLD AUTO: 4.6 10*6/MM3 (ref 3.77–5.28)
RBC # UR STRIP: ABNORMAL /HPF
REF LAB TEST METHOD: ABNORMAL
SODIUM SERPL-SCNC: 141 MMOL/L (ref 136–145)
SODIUM SERPL-SCNC: 144 MMOL/L (ref 136–145)
SP GR UR STRIP: 1.02 (ref 1–1.03)
SQUAMOUS #/AREA URNS HPF: ABNORMAL /HPF
TROPONIN T SERPL-MCNC: <0.01 NG/ML (ref 0–0.03)
UROBILINOGEN UR QL STRIP: ABNORMAL
WBC # UR STRIP: ABNORMAL /HPF
WBC NRBC COR # BLD: 12.46 10*3/MM3 (ref 3.4–10.8)

## 2022-09-04 PROCEDURE — 83690 ASSAY OF LIPASE: CPT | Performed by: PHYSICIAN ASSISTANT

## 2022-09-04 PROCEDURE — 25010000002 METOCLOPRAMIDE PER 10 MG: Performed by: ANESTHESIOLOGY

## 2022-09-04 PROCEDURE — 93005 ELECTROCARDIOGRAM TRACING: CPT | Performed by: EMERGENCY MEDICINE

## 2022-09-04 PROCEDURE — 94761 N-INVAS EAR/PLS OXIMETRY MLT: CPT

## 2022-09-04 PROCEDURE — A9270 NON-COVERED ITEM OR SERVICE: HCPCS | Performed by: SURGERY

## 2022-09-04 PROCEDURE — 47562 LAPAROSCOPIC CHOLECYSTECTOMY: CPT | Performed by: SURGERY

## 2022-09-04 PROCEDURE — 63710000001 HYDROCODONE-ACETAMINOPHEN 7.5-325 MG TABLET: Performed by: ANESTHESIOLOGY

## 2022-09-04 PROCEDURE — 25010000002 CEFEPIME PER 500 MG: Performed by: PHYSICIAN ASSISTANT

## 2022-09-04 PROCEDURE — 88304 TISSUE EXAM BY PATHOLOGIST: CPT | Performed by: SURGERY

## 2022-09-04 PROCEDURE — 25010000002 PROPOFOL 10 MG/ML EMULSION: Performed by: ANESTHESIOLOGY

## 2022-09-04 PROCEDURE — 81001 URINALYSIS AUTO W/SCOPE: CPT | Performed by: PHYSICIAN ASSISTANT

## 2022-09-04 PROCEDURE — 63710000001 CARVEDILOL 3.125 MG TABLET: Performed by: SURGERY

## 2022-09-04 PROCEDURE — G0378 HOSPITAL OBSERVATION PER HR: HCPCS

## 2022-09-04 PROCEDURE — 96365 THER/PROPH/DIAG IV INF INIT: CPT

## 2022-09-04 PROCEDURE — 96375 TX/PRO/DX INJ NEW DRUG ADDON: CPT

## 2022-09-04 PROCEDURE — 25010000002 ONDANSETRON PER 1 MG: Performed by: PHYSICIAN ASSISTANT

## 2022-09-04 PROCEDURE — 63710000001 PANTOPRAZOLE 40 MG TABLET DELAYED-RELEASE: Performed by: SURGERY

## 2022-09-04 PROCEDURE — 83735 ASSAY OF MAGNESIUM: CPT | Performed by: PHYSICIAN ASSISTANT

## 2022-09-04 PROCEDURE — 74300 X-RAY BILE DUCTS/PANCREAS: CPT

## 2022-09-04 PROCEDURE — 93005 ELECTROCARDIOGRAM TRACING: CPT

## 2022-09-04 PROCEDURE — 63710000001 POTASSIUM CHLORIDE 10 MEQ TABLET CONTROLLED-RELEASE: Performed by: INTERNAL MEDICINE

## 2022-09-04 PROCEDURE — A9270 NON-COVERED ITEM OR SERVICE: HCPCS | Performed by: INTERNAL MEDICINE

## 2022-09-04 PROCEDURE — 94640 AIRWAY INHALATION TREATMENT: CPT

## 2022-09-04 PROCEDURE — 25010000002 IOPAMIDOL 61 % SOLUTION: Performed by: EMERGENCY MEDICINE

## 2022-09-04 PROCEDURE — 25010000002 SUCCINYLCHOLINE PER 20 MG: Performed by: ANESTHESIOLOGY

## 2022-09-04 PROCEDURE — 85025 COMPLETE CBC W/AUTO DIFF WBC: CPT | Performed by: PHYSICIAN ASSISTANT

## 2022-09-04 PROCEDURE — 96376 TX/PRO/DX INJ SAME DRUG ADON: CPT

## 2022-09-04 PROCEDURE — 84484 ASSAY OF TROPONIN QUANT: CPT | Performed by: PHYSICIAN ASSISTANT

## 2022-09-04 PROCEDURE — 47562 LAPAROSCOPIC CHOLECYSTECTOMY: CPT | Performed by: SPECIALIST/TECHNOLOGIST, OTHER

## 2022-09-04 PROCEDURE — 93010 ELECTROCARDIOGRAM REPORT: CPT | Performed by: INTERNAL MEDICINE

## 2022-09-04 PROCEDURE — 80053 COMPREHEN METABOLIC PANEL: CPT | Performed by: PHYSICIAN ASSISTANT

## 2022-09-04 PROCEDURE — 0 IOTHALAMATE 60 % SOLUTION: Performed by: SURGERY

## 2022-09-04 PROCEDURE — 74177 CT ABD & PELVIS W/CONTRAST: CPT

## 2022-09-04 PROCEDURE — A9270 NON-COVERED ITEM OR SERVICE: HCPCS | Performed by: ANESTHESIOLOGY

## 2022-09-04 PROCEDURE — 94799 UNLISTED PULMONARY SVC/PX: CPT

## 2022-09-04 PROCEDURE — 63710000001 HYDROCODONE-ACETAMINOPHEN 5-325 MG TABLET: Performed by: SURGERY

## 2022-09-04 PROCEDURE — 25010000002 FENTANYL CITRATE (PF) 50 MCG/ML SOLUTION: Performed by: ANESTHESIOLOGY

## 2022-09-04 PROCEDURE — 99284 EMERGENCY DEPT VISIT MOD MDM: CPT

## 2022-09-04 PROCEDURE — 25010000002 FUROSEMIDE PER 20 MG: Performed by: INTERNAL MEDICINE

## 2022-09-04 PROCEDURE — 25010000002 HYDROMORPHONE PER 4 MG: Performed by: ANESTHESIOLOGY

## 2022-09-04 PROCEDURE — 63710000001 LEVOTHYROXINE 50 MCG TABLET: Performed by: SURGERY

## 2022-09-04 PROCEDURE — 96366 THER/PROPH/DIAG IV INF ADDON: CPT

## 2022-09-04 PROCEDURE — 25010000002 MORPHINE PER 10 MG: Performed by: EMERGENCY MEDICINE

## 2022-09-04 PROCEDURE — 99204 OFFICE O/P NEW MOD 45 MIN: CPT | Performed by: SURGERY

## 2022-09-04 PROCEDURE — 71045 X-RAY EXAM CHEST 1 VIEW: CPT

## 2022-09-04 PROCEDURE — 87086 URINE CULTURE/COLONY COUNT: CPT | Performed by: PHYSICIAN ASSISTANT

## 2022-09-04 PROCEDURE — 25010000002 ONDANSETRON PER 1 MG: Performed by: SURGERY

## 2022-09-04 PROCEDURE — 36415 COLL VENOUS BLD VENIPUNCTURE: CPT

## 2022-09-04 PROCEDURE — 76705 ECHO EXAM OF ABDOMEN: CPT

## 2022-09-04 DEVICE — HORIZON TI ML 6 CLIPS/CART
Type: IMPLANTABLE DEVICE | Site: ABDOMEN | Status: FUNCTIONAL
Brand: WECK

## 2022-09-04 RX ORDER — IBUPROFEN 600 MG/1
600 TABLET ORAL ONCE AS NEEDED
Status: DISCONTINUED | OUTPATIENT
Start: 2022-09-04 | End: 2022-09-04 | Stop reason: HOSPADM

## 2022-09-04 RX ORDER — SODIUM CHLORIDE 9 MG/ML
INJECTION, SOLUTION INTRAVENOUS AS NEEDED
Status: DISCONTINUED | OUTPATIENT
Start: 2022-09-04 | End: 2022-09-04 | Stop reason: HOSPADM

## 2022-09-04 RX ORDER — LEVOTHYROXINE SODIUM 0.05 MG/1
50 TABLET ORAL DAILY
Status: DISCONTINUED | OUTPATIENT
Start: 2022-09-04 | End: 2022-09-06 | Stop reason: HOSPADM

## 2022-09-04 RX ORDER — ACETAMINOPHEN 160 MG/5ML
650 SOLUTION ORAL EVERY 4 HOURS PRN
Status: DISCONTINUED | OUTPATIENT
Start: 2022-09-04 | End: 2022-09-06 | Stop reason: HOSPADM

## 2022-09-04 RX ORDER — FLUMAZENIL 0.1 MG/ML
0.2 INJECTION INTRAVENOUS AS NEEDED
Status: DISCONTINUED | OUTPATIENT
Start: 2022-09-04 | End: 2022-09-04 | Stop reason: HOSPADM

## 2022-09-04 RX ORDER — IPRATROPIUM BROMIDE AND ALBUTEROL SULFATE 2.5; .5 MG/3ML; MG/3ML
3 SOLUTION RESPIRATORY (INHALATION) ONCE
Status: COMPLETED | OUTPATIENT
Start: 2022-09-04 | End: 2022-09-04

## 2022-09-04 RX ORDER — NALOXONE HCL 0.4 MG/ML
0.4 VIAL (ML) INJECTION
Status: DISCONTINUED | OUTPATIENT
Start: 2022-09-04 | End: 2022-09-06 | Stop reason: HOSPADM

## 2022-09-04 RX ORDER — FENTANYL CITRATE 50 UG/ML
50 INJECTION, SOLUTION INTRAMUSCULAR; INTRAVENOUS
Status: DISCONTINUED | OUTPATIENT
Start: 2022-09-04 | End: 2022-09-04 | Stop reason: HOSPADM

## 2022-09-04 RX ORDER — LIDOCAINE HYDROCHLORIDE 10 MG/ML
0.5 INJECTION, SOLUTION EPIDURAL; INFILTRATION; INTRACAUDAL; PERINEURAL ONCE AS NEEDED
Status: DISCONTINUED | OUTPATIENT
Start: 2022-09-04 | End: 2022-09-04 | Stop reason: HOSPADM

## 2022-09-04 RX ORDER — SODIUM CHLORIDE 0.9 % (FLUSH) 0.9 %
10 SYRINGE (ML) INJECTION AS NEEDED
Status: DISCONTINUED | OUTPATIENT
Start: 2022-09-04 | End: 2022-09-04 | Stop reason: HOSPADM

## 2022-09-04 RX ORDER — LIDOCAINE HYDROCHLORIDE 20 MG/ML
INJECTION, SOLUTION INFILTRATION; PERINEURAL AS NEEDED
Status: DISCONTINUED | OUTPATIENT
Start: 2022-09-04 | End: 2022-09-04 | Stop reason: SURG

## 2022-09-04 RX ORDER — DIPHENHYDRAMINE HYDROCHLORIDE 50 MG/ML
12.5 INJECTION INTRAMUSCULAR; INTRAVENOUS
Status: DISCONTINUED | OUTPATIENT
Start: 2022-09-04 | End: 2022-09-04 | Stop reason: HOSPADM

## 2022-09-04 RX ORDER — SODIUM CHLORIDE 0.9 % (FLUSH) 0.9 %
10 SYRINGE (ML) INJECTION AS NEEDED
Status: DISCONTINUED | OUTPATIENT
Start: 2022-09-04 | End: 2022-09-06 | Stop reason: HOSPADM

## 2022-09-04 RX ORDER — SODIUM CHLORIDE 0.9 % (FLUSH) 0.9 %
10 SYRINGE (ML) INJECTION EVERY 12 HOURS SCHEDULED
Status: DISCONTINUED | OUTPATIENT
Start: 2022-09-04 | End: 2022-09-04 | Stop reason: HOSPADM

## 2022-09-04 RX ORDER — LABETALOL HYDROCHLORIDE 5 MG/ML
5 INJECTION, SOLUTION INTRAVENOUS
Status: DISCONTINUED | OUTPATIENT
Start: 2022-09-04 | End: 2022-09-04 | Stop reason: HOSPADM

## 2022-09-04 RX ORDER — POTASSIUM CHLORIDE 7.45 MG/ML
10 INJECTION INTRAVENOUS
Status: DISCONTINUED | OUTPATIENT
Start: 2022-09-04 | End: 2022-09-06 | Stop reason: HOSPADM

## 2022-09-04 RX ORDER — NALOXONE HCL 0.4 MG/ML
0.2 VIAL (ML) INJECTION AS NEEDED
Status: DISCONTINUED | OUTPATIENT
Start: 2022-09-04 | End: 2022-09-04 | Stop reason: HOSPADM

## 2022-09-04 RX ORDER — SODIUM CHLORIDE 0.9 % (FLUSH) 0.9 %
10 SYRINGE (ML) INJECTION EVERY 12 HOURS SCHEDULED
Status: DISCONTINUED | OUTPATIENT
Start: 2022-09-04 | End: 2022-09-06 | Stop reason: HOSPADM

## 2022-09-04 RX ORDER — FUROSEMIDE 10 MG/ML
40 INJECTION INTRAMUSCULAR; INTRAVENOUS ONCE
Status: COMPLETED | OUTPATIENT
Start: 2022-09-04 | End: 2022-09-04

## 2022-09-04 RX ORDER — HYDROCODONE BITARTRATE AND ACETAMINOPHEN 7.5; 325 MG/1; MG/1
1 TABLET ORAL ONCE AS NEEDED
Status: COMPLETED | OUTPATIENT
Start: 2022-09-04 | End: 2022-09-04

## 2022-09-04 RX ORDER — PROPOFOL 10 MG/ML
VIAL (ML) INTRAVENOUS AS NEEDED
Status: DISCONTINUED | OUTPATIENT
Start: 2022-09-04 | End: 2022-09-04 | Stop reason: SURG

## 2022-09-04 RX ORDER — METRONIDAZOLE 500 MG/100ML
500 INJECTION, SOLUTION INTRAVENOUS EVERY 8 HOURS
Status: DISCONTINUED | OUTPATIENT
Start: 2022-09-04 | End: 2022-09-05

## 2022-09-04 RX ORDER — HYDRALAZINE HYDROCHLORIDE 20 MG/ML
5 INJECTION INTRAMUSCULAR; INTRAVENOUS
Status: DISCONTINUED | OUTPATIENT
Start: 2022-09-04 | End: 2022-09-04 | Stop reason: HOSPADM

## 2022-09-04 RX ORDER — HYDROCODONE BITARTRATE AND ACETAMINOPHEN 5; 325 MG/1; MG/1
1 TABLET ORAL EVERY 6 HOURS PRN
Status: DISCONTINUED | OUTPATIENT
Start: 2022-09-04 | End: 2022-09-06 | Stop reason: HOSPADM

## 2022-09-04 RX ORDER — ONDANSETRON 2 MG/ML
4 INJECTION INTRAMUSCULAR; INTRAVENOUS ONCE AS NEEDED
Status: DISCONTINUED | OUTPATIENT
Start: 2022-09-04 | End: 2022-09-04 | Stop reason: HOSPADM

## 2022-09-04 RX ORDER — SODIUM CHLORIDE 9 MG/ML
100 INJECTION, SOLUTION INTRAVENOUS CONTINUOUS
Status: DISCONTINUED | OUTPATIENT
Start: 2022-09-04 | End: 2022-09-04

## 2022-09-04 RX ORDER — PROMETHAZINE HYDROCHLORIDE 25 MG/1
25 TABLET ORAL ONCE AS NEEDED
Status: DISCONTINUED | OUTPATIENT
Start: 2022-09-04 | End: 2022-09-04 | Stop reason: HOSPADM

## 2022-09-04 RX ORDER — CEFAZOLIN SODIUM 2 G/100ML
2 INJECTION, SOLUTION INTRAVENOUS ONCE
Status: CANCELLED | OUTPATIENT
Start: 2022-09-04 | End: 2022-09-04

## 2022-09-04 RX ORDER — POTASSIUM CHLORIDE 1.5 G/1.77G
40 POWDER, FOR SOLUTION ORAL AS NEEDED
Status: DISCONTINUED | OUTPATIENT
Start: 2022-09-04 | End: 2022-09-06 | Stop reason: HOSPADM

## 2022-09-04 RX ORDER — EPHEDRINE SULFATE 50 MG/ML
5 INJECTION, SOLUTION INTRAVENOUS ONCE AS NEEDED
Status: DISCONTINUED | OUTPATIENT
Start: 2022-09-04 | End: 2022-09-04 | Stop reason: HOSPADM

## 2022-09-04 RX ORDER — METRONIDAZOLE 500 MG/100ML
500 INJECTION, SOLUTION INTRAVENOUS ONCE
Status: COMPLETED | OUTPATIENT
Start: 2022-09-04 | End: 2022-09-04

## 2022-09-04 RX ORDER — POTASSIUM CHLORIDE 750 MG/1
40 TABLET, FILM COATED, EXTENDED RELEASE ORAL ONCE
Status: COMPLETED | OUTPATIENT
Start: 2022-09-04 | End: 2022-09-04

## 2022-09-04 RX ORDER — FENTANYL CITRATE 50 UG/ML
INJECTION, SOLUTION INTRAMUSCULAR; INTRAVENOUS AS NEEDED
Status: DISCONTINUED | OUTPATIENT
Start: 2022-09-04 | End: 2022-09-04 | Stop reason: SURG

## 2022-09-04 RX ORDER — ACETAMINOPHEN 650 MG/1
650 SUPPOSITORY RECTAL EVERY 4 HOURS PRN
Status: DISCONTINUED | OUTPATIENT
Start: 2022-09-04 | End: 2022-09-06 | Stop reason: HOSPADM

## 2022-09-04 RX ORDER — ROCURONIUM BROMIDE 10 MG/ML
INJECTION, SOLUTION INTRAVENOUS AS NEEDED
Status: DISCONTINUED | OUTPATIENT
Start: 2022-09-04 | End: 2022-09-04 | Stop reason: SURG

## 2022-09-04 RX ORDER — ACETAMINOPHEN 325 MG/1
650 TABLET ORAL EVERY 4 HOURS PRN
Status: DISCONTINUED | OUTPATIENT
Start: 2022-09-04 | End: 2022-09-06 | Stop reason: HOSPADM

## 2022-09-04 RX ORDER — SUCCINYLCHOLINE CHLORIDE 20 MG/ML
INJECTION INTRAMUSCULAR; INTRAVENOUS AS NEEDED
Status: DISCONTINUED | OUTPATIENT
Start: 2022-09-04 | End: 2022-09-04 | Stop reason: SURG

## 2022-09-04 RX ORDER — DIPHENHYDRAMINE HCL 25 MG
25 CAPSULE ORAL
Status: DISCONTINUED | OUTPATIENT
Start: 2022-09-04 | End: 2022-09-04 | Stop reason: HOSPADM

## 2022-09-04 RX ORDER — HYDROMORPHONE HYDROCHLORIDE 1 MG/ML
0.5 INJECTION, SOLUTION INTRAMUSCULAR; INTRAVENOUS; SUBCUTANEOUS
Status: DISCONTINUED | OUTPATIENT
Start: 2022-09-04 | End: 2022-09-04 | Stop reason: HOSPADM

## 2022-09-04 RX ORDER — MAGNESIUM HYDROXIDE 1200 MG/15ML
LIQUID ORAL AS NEEDED
Status: DISCONTINUED | OUTPATIENT
Start: 2022-09-04 | End: 2022-09-04 | Stop reason: HOSPADM

## 2022-09-04 RX ORDER — POTASSIUM CHLORIDE 750 MG/1
40 TABLET, FILM COATED, EXTENDED RELEASE ORAL AS NEEDED
Status: DISCONTINUED | OUTPATIENT
Start: 2022-09-04 | End: 2022-09-06 | Stop reason: HOSPADM

## 2022-09-04 RX ORDER — PROMETHAZINE HYDROCHLORIDE 25 MG/1
25 SUPPOSITORY RECTAL ONCE AS NEEDED
Status: DISCONTINUED | OUTPATIENT
Start: 2022-09-04 | End: 2022-09-04 | Stop reason: HOSPADM

## 2022-09-04 RX ORDER — ONDANSETRON 2 MG/ML
4 INJECTION INTRAMUSCULAR; INTRAVENOUS ONCE
Status: COMPLETED | OUTPATIENT
Start: 2022-09-04 | End: 2022-09-04

## 2022-09-04 RX ORDER — BUPIVACAINE HYDROCHLORIDE AND EPINEPHRINE 5; 5 MG/ML; UG/ML
INJECTION, SOLUTION EPIDURAL; INTRACAUDAL; PERINEURAL AS NEEDED
Status: DISCONTINUED | OUTPATIENT
Start: 2022-09-04 | End: 2022-09-04 | Stop reason: HOSPADM

## 2022-09-04 RX ORDER — PANTOPRAZOLE SODIUM 40 MG/1
40 TABLET, DELAYED RELEASE ORAL EVERY MORNING
Status: DISCONTINUED | OUTPATIENT
Start: 2022-09-04 | End: 2022-09-06 | Stop reason: HOSPADM

## 2022-09-04 RX ORDER — HYDROMORPHONE HYDROCHLORIDE 1 MG/ML
0.5 INJECTION, SOLUTION INTRAMUSCULAR; INTRAVENOUS; SUBCUTANEOUS
Status: DISCONTINUED | OUTPATIENT
Start: 2022-09-04 | End: 2022-09-06 | Stop reason: HOSPADM

## 2022-09-04 RX ORDER — ONDANSETRON 2 MG/ML
4 INJECTION INTRAMUSCULAR; INTRAVENOUS EVERY 6 HOURS PRN
Status: DISCONTINUED | OUTPATIENT
Start: 2022-09-04 | End: 2022-09-06 | Stop reason: HOSPADM

## 2022-09-04 RX ORDER — SODIUM CHLORIDE, SODIUM LACTATE, POTASSIUM CHLORIDE, CALCIUM CHLORIDE 600; 310; 30; 20 MG/100ML; MG/100ML; MG/100ML; MG/100ML
9 INJECTION, SOLUTION INTRAVENOUS CONTINUOUS PRN
Status: DISCONTINUED | OUTPATIENT
Start: 2022-09-04 | End: 2022-09-04 | Stop reason: HOSPADM

## 2022-09-04 RX ORDER — MORPHINE SULFATE 2 MG/ML
2 INJECTION, SOLUTION INTRAMUSCULAR; INTRAVENOUS ONCE
Status: COMPLETED | OUTPATIENT
Start: 2022-09-04 | End: 2022-09-04

## 2022-09-04 RX ORDER — CARVEDILOL 3.12 MG/1
3.12 TABLET ORAL 2 TIMES DAILY
Status: DISCONTINUED | OUTPATIENT
Start: 2022-09-04 | End: 2022-09-06 | Stop reason: HOSPADM

## 2022-09-04 RX ORDER — OXYCODONE AND ACETAMINOPHEN 7.5; 325 MG/1; MG/1
1 TABLET ORAL EVERY 4 HOURS PRN
Status: DISCONTINUED | OUTPATIENT
Start: 2022-09-04 | End: 2022-09-04 | Stop reason: HOSPADM

## 2022-09-04 RX ADMIN — FENTANYL CITRATE 100 MCG: 50 INJECTION INTRAMUSCULAR; INTRAVENOUS at 09:59

## 2022-09-04 RX ADMIN — METRONIDAZOLE 500 MG: 500 INJECTION, SOLUTION INTRAVENOUS at 06:13

## 2022-09-04 RX ADMIN — HYDROCODONE BITARTRATE AND ACETAMINOPHEN 1 TABLET: 7.5; 325 TABLET ORAL at 11:12

## 2022-09-04 RX ADMIN — SODIUM CHLORIDE 500 ML: 9 INJECTION, SOLUTION INTRAVENOUS at 01:03

## 2022-09-04 RX ADMIN — SUCCINYLCHOLINE CHLORIDE 200 MG: 20 INJECTION, SOLUTION INTRAMUSCULAR; INTRAVENOUS; PARENTERAL at 09:32

## 2022-09-04 RX ADMIN — METRONIDAZOLE 500 MG: 500 INJECTION, SOLUTION INTRAVENOUS at 17:55

## 2022-09-04 RX ADMIN — PANTOPRAZOLE SODIUM 40 MG: 40 TABLET, DELAYED RELEASE ORAL at 17:54

## 2022-09-04 RX ADMIN — IOPAMIDOL 85 ML: 612 INJECTION, SOLUTION INTRAVENOUS at 03:19

## 2022-09-04 RX ADMIN — CARVEDILOL 3.12 MG: 3.12 TABLET, FILM COATED ORAL at 20:35

## 2022-09-04 RX ADMIN — MORPHINE SULFATE 2 MG: 2 INJECTION, SOLUTION INTRAMUSCULAR; INTRAVENOUS at 01:05

## 2022-09-04 RX ADMIN — ONDANSETRON 4 MG: 2 INJECTION INTRAMUSCULAR; INTRAVENOUS at 05:42

## 2022-09-04 RX ADMIN — HYDROMORPHONE HYDROCHLORIDE 0.5 MG: 1 INJECTION, SOLUTION INTRAMUSCULAR; INTRAVENOUS; SUBCUTANEOUS at 11:13

## 2022-09-04 RX ADMIN — PROPOFOL 150 MG: 10 INJECTION, EMULSION INTRAVENOUS at 09:31

## 2022-09-04 RX ADMIN — LEVOTHYROXINE SODIUM 50 MCG: 0.05 TABLET ORAL at 17:55

## 2022-09-04 RX ADMIN — PROPOFOL 100 MG: 10 INJECTION, EMULSION INTRAVENOUS at 09:55

## 2022-09-04 RX ADMIN — CEFEPIME 2 G: 2 INJECTION, POWDER, FOR SOLUTION INTRAVENOUS at 05:35

## 2022-09-04 RX ADMIN — FUROSEMIDE 40 MG: 10 INJECTION, SOLUTION INTRAMUSCULAR; INTRAVENOUS at 12:23

## 2022-09-04 RX ADMIN — ONDANSETRON 4 MG: 2 INJECTION INTRAMUSCULAR; INTRAVENOUS at 18:21

## 2022-09-04 RX ADMIN — LIDOCAINE HYDROCHLORIDE 100 MG: 20 INJECTION, SOLUTION INFILTRATION; PERINEURAL at 09:31

## 2022-09-04 RX ADMIN — ONDANSETRON 4 MG: 2 INJECTION INTRAMUSCULAR; INTRAVENOUS at 01:04

## 2022-09-04 RX ADMIN — METOCLOPRAMIDE HYDROCHLORIDE 10 MG: 5 INJECTION INTRAMUSCULAR; INTRAVENOUS at 09:30

## 2022-09-04 RX ADMIN — ROCURONIUM BROMIDE 45 MG: 50 INJECTION INTRAVENOUS at 09:46

## 2022-09-04 RX ADMIN — IPRATROPIUM BROMIDE AND ALBUTEROL SULFATE 3 ML: .5; 3 SOLUTION RESPIRATORY (INHALATION) at 11:46

## 2022-09-04 RX ADMIN — ROCURONIUM BROMIDE 5 MG: 50 INJECTION INTRAVENOUS at 09:30

## 2022-09-04 RX ADMIN — POTASSIUM CHLORIDE 40 MEQ: 750 TABLET, EXTENDED RELEASE ORAL at 17:54

## 2022-09-04 RX ADMIN — Medication 10 ML: at 20:35

## 2022-09-04 RX ADMIN — SUGAMMADEX 400 MG: 100 INJECTION, SOLUTION INTRAVENOUS at 10:38

## 2022-09-04 RX ADMIN — HYDROCODONE BITARTRATE AND ACETAMINOPHEN 1 TABLET: 5; 325 TABLET ORAL at 20:41

## 2022-09-04 RX ADMIN — FUROSEMIDE 40 MG: 10 INJECTION, SOLUTION INTRAMUSCULAR; INTRAVENOUS at 17:55

## 2022-09-04 RX ADMIN — SODIUM CHLORIDE 100 ML/HR: 9 INJECTION, SOLUTION INTRAVENOUS at 06:13

## 2022-09-04 RX ADMIN — SODIUM CHLORIDE, POTASSIUM CHLORIDE, SODIUM LACTATE AND CALCIUM CHLORIDE: 600; 310; 30; 20 INJECTION, SOLUTION INTRAVENOUS at 09:27

## 2022-09-04 NOTE — ED NOTES
Patient to ED via PV c/o upper abdominal pain beginning about 3 hours ago. Patient states this pain radiates into her back. Patient reports nausea, no vomiting.

## 2022-09-04 NOTE — ED NOTES
".  Nursing report ED to floor  Codie Munson  77 y.o.  female    HPI :   Chief Complaint   Patient presents with   • Abdominal Pain       Admitting doctor:   Gabriel Tang MD    Admitting diagnosis:   The primary encounter diagnosis was Acute cholecystitis. Diagnoses of Hyperlipidemia, unspecified hyperlipidemia type, Hypothyroidism, unspecified type, and History of cardiomyopathy were also pertinent to this visit.    Code status:   Current Code Status     Date Active Code Status Order ID Comments User Context       9/4/2022 0546 CPR (Attempt to Resuscitate) [id number]  Kelly Snell, APRN ED     Advance Care Planning Activity      Questions for Current Code Status     Question Answer    Code Status (Patient has no pulse and is not breathing) CPR (Attempt to Resuscitate)    Medical Interventions (Patient has pulse or is breathing) Full Support          Allergies:   Augmentin [amoxicillin-pot clavulanate] and Biaxin [clarithromycin]    Intake and Output  No intake or output data in the 24 hours ending 09/04/22 0609    Weight:       09/04/22  0501   Weight: 67.1 kg (148 lb)       Most recent vitals:   Vitals:    09/04/22 0231 09/04/22 0331 09/04/22 0400 09/04/22 0501   BP: 126/67 124/63 137/72 135/79   BP Location:  Left arm  Left arm   Patient Position:  Lying  Lying   Pulse: 68 82 77 74   Resp:  18  18   Temp:       TempSrc:       SpO2: 93% 94% 94% 91%   Weight:    67.1 kg (148 lb)   Height:    162.6 cm (64\")       Active LDAs/IV Access:   Lines, Drains & Airways     Active LDAs     Name Placement date Placement time Site Days    Peripheral IV 09/04/22 0101 Anterior;Proximal;Right Forearm 09/04/22  0101  Forearm  less than 1                Labs (abnormal labs have a star):   Labs Reviewed   COMPREHENSIVE METABOLIC PANEL - Abnormal; Notable for the following components:       Result Value    Glucose 121 (*)     All other components within normal limits    Narrative:     GFR Normal >60  Chronic Kidney Disease " <60  Kidney Failure <15     URINALYSIS W/ MICROSCOPIC IF INDICATED (NO CULTURE) - Abnormal; Notable for the following components:    Appearance, UA Cloudy (*)     Ketones, UA Trace (*)     Blood, UA Small (1+) (*)     Leuk Esterase, UA Moderate (2+) (*)     All other components within normal limits   CBC WITH AUTO DIFFERENTIAL - Abnormal; Notable for the following components:    WBC 12.46 (*)     Neutrophil % 76.9 (*)     Lymphocyte % 14.9 (*)     Neutrophils, Absolute 9.58 (*)     All other components within normal limits   URINALYSIS, MICROSCOPIC ONLY - Abnormal; Notable for the following components:    RBC, UA 3-5 (*)     WBC, UA 21-30 (*)     Squamous Epithelial Cells, UA 3-6 (*)     All other components within normal limits   TROPONIN (IN-HOUSE) - Normal    Narrative:     Troponin T Reference Range:  <= 0.03 ng/mL-   Negative for AMI  >0.03 ng/mL-     Abnormal for myocardial necrosis.  Clinicians would have to utilize clinical acumen, EKG, Troponin and serial changes to determine if it is an Acute Myocardial Infarction or myocardial injury due to an underlying chronic condition.       Results may be falsely decreased if patient taking Biotin.     LIPASE - Normal   MAGNESIUM - Normal   URINE CULTURE   BASIC METABOLIC PANEL   CBC AND DIFFERENTIAL    Narrative:     The following orders were created for panel order CBC & Differential.  Procedure                               Abnormality         Status                     ---------                               -----------         ------                     CBC Auto Differential[[id number]]        Abnormal            Final result                 Please view results for these tests on the individual orders.       EKG:   ECG 12 Lead   Preliminary Result   HEART RATE= 66  bpm   RR Interval= 909  ms   VT Interval= 169  ms   P Horizontal Axis= -6  deg   P Front Axis= 44  deg   QRSD Interval= 105  ms   QT Interval= 401  ms   QRS Axis= -7  deg   T Wave Axis= 0  deg   -  ABNORMAL ECG -   Sinus rhythm   Nonspecific repolarization abnormalities   Electronically Signed By:    Date and Time of Study: 2022-09-04 00:17:41          Meds given in ED:   Medications   sodium chloride 0.9 % flush 10 mL (has no administration in time range)   metroNIDAZOLE (FLAGYL) IVPB 500 mg (has no administration in time range)   sodium chloride 0.9 % flush 10 mL (has no administration in time range)   sodium chloride 0.9 % flush 10 mL (has no administration in time range)   sodium chloride 0.9 % infusion (has no administration in time range)   acetaminophen (TYLENOL) tablet 650 mg (has no administration in time range)     Or   acetaminophen (TYLENOL) 160 MG/5ML solution 650 mg (has no administration in time range)     Or   acetaminophen (TYLENOL) suppository 650 mg (has no administration in time range)   ondansetron (ZOFRAN) injection 4 mg (has no administration in time range)   HYDROmorphone (DILAUDID) injection 0.5 mg (has no administration in time range)     And   naloxone (NARCAN) injection 0.4 mg (has no administration in time range)   sodium chloride 0.9 % bolus 500 mL (0 mL Intravenous Stopped 9/4/22 0307)   ondansetron (ZOFRAN) injection 4 mg (4 mg Intravenous Given 9/4/22 0104)   morphine injection 2 mg (2 mg Intravenous Given 9/4/22 0105)   iopamidol (ISOVUE-300) 61 % injection 100 mL (85 mL Intravenous Given by Other 9/4/22 0319)   cefepime 2 gm IVPB in 100 ml NS (VTB) (2 g Intravenous New Bag 9/4/22 0576)   ondansetron (ZOFRAN) injection 4 mg (4 mg Intravenous Given 9/4/22 0542)       Imaging results:  CT Abdomen Pelvis With Contrast    Result Date: 9/4/2022   1. The patient's gallbladder is distended. There is trace pericholecystic fluid, and there is potentially a stone lodged within the cystic duct. Appearance is concerning for acute cholecystitis. Gallbladder ultrasound would be confirmatory. 2. Thick-walled appearance to the urinary bladder, with perivesical soft tissue stranding.  Correlation with urinalysis and urine cultures is recommended.  Radiation dose reduction techniques were utilized, including automated exposure control and exposure modulation based on body size.  This report was finalized on 9/4/2022 3:35 AM by Dr. Yee Rosenbaum M.D.      US Gallbladder    Result Date: 9/4/2022  Gallbladder is again noted to be distended. There is some gallbladder wall thickening, although no gallstones or pericholecystic fluid is seen on the current study. However, given the findings on CT, as well as history of upper abdominal pain and nausea, acute cholecystitis remains in the differential. HIDA scan could be considered if clinical doubt persists.  This report was finalized on 9/4/2022 4:40 AM by Dr. Yee Rosenbaum M.D.        Ambulatory status:   - independent    Social issues:   Social History     Socioeconomic History   • Marital status:    Tobacco Use   • Smoking status: Never Smoker   • Smokeless tobacco: Never Used   • Tobacco comment: drink caffiene daily   Vaping Use   • Vaping Use: Never used   Substance and Sexual Activity   • Alcohol use: No   • Drug use: Defer   • Sexual activity: Defer       NIH Stroke Scale:        Nursing report ED to floor:

## 2022-09-04 NOTE — PLAN OF CARE
Goal Outcome Evaluation:  Plan of Care Reviewed With: patient        Progress: improving  Outcome Evaluation: Pt arrived from PACU alert and oriented x4, on 2L O2, diminished lung sounds, vss, afebrile, abdominal lap sites x5 with dermabond are clean, dry and intact, received IV Flagyl, pt vomitted after receiving Norco for pain, Norco was whole and intact, given Zofran. Will reassess for pain management after nausea has resolved.

## 2022-09-04 NOTE — H&P
Patient Name:  Codie Munson  YOB: 1945  MRN:  [id number]  Admit Date:  9/4/2022  Patient Care Team:  Sri Leach PA-C as PCP - General (Family Medicine)      Subjective   History Present Illness     Chief Complaint   Patient presents with   • Abdominal Pain       Ms. Munson is a 77 y.o. female with a history of cardiomyopathy, hypertension, hypothyroidism, GERD that presents to [facility] complaining of epigastric and abdominal pain with onset last night after eating dinner.  The pain started severe and felt like a tightness or a band around her abdomen.  It radiated straight through to her back.She had no relief with acid inhibitors at home  A couple hours after onset of the pain she began to have nausea.  Shortly after arriving to the hospital she had a large episode of vomiting which slightly relieved her epigastric pain.  She currently feels much better.  She denies any fever or chills or decreased appetite.  Prior to the onset of the pain last night she had been feeling well without any issues.  No constipation or diarrhea or blood in her stool.  No recent diet changes or medication changes.  She has been compliant with her daily home meds.  She has a history of cardiomyopathy and high blood pressure for which she is followed by Dr. Lane for many years.  She has issues with reflux.    Review of Systems   Constitutional: Negative for chills and fever.   HENT: Negative for congestion and rhinorrhea.    Eyes: Negative for visual disturbance.   Respiratory: Negative for cough and shortness of breath.    Cardiovascular: Negative for chest pain and palpitations.   Gastrointestinal: Positive for abdominal distention, abdominal pain, nausea and vomiting. Negative for blood in stool, constipation and diarrhea.   Genitourinary: Negative for difficulty urinating, dysuria, flank pain, frequency and urgency.   Musculoskeletal: Negative for arthralgias and myalgias.   Skin:  Negative for rash and wound.   Neurological: Negative for dizziness and light-headedness.        Personal History     Past Medical History:   Diagnosis Date   • Cardiomyopathy (HCC)    • Chronic cystitis    • Cough    • Depression    • GERD (gastroesophageal reflux disease)    • Hyperlipidemia    • Hypertension    • Scarlet fever    • Spinal headache    • Strain of thoracic region    • Urethral prolapse    • URI (upper respiratory infection)    • Urinary retention      Past Surgical History:   Procedure Laterality Date   • APPENDECTOMY     • BLADDER SURGERY     • CARDIAC CATHETERIZATION      Normal. Performed at [facility].   •  SECTION     • COLONOSCOPY     • HYSTERECTOMY     • VEIN LIGATION AND STRIPPING       Family History   Problem Relation Age of Onset   • Heart failure Mother    • Cancer Mother         Bladder   • Kidney failure Mother    • Aneurysm Father    • Heart disease Father         CABG   • Stroke Father    • Crohn's disease Sister    • Prostate cancer Brother    • Cancer Brother         bladder   • Breast cancer Neg Hx      Social History     Tobacco Use   • Smoking status: Never Smoker   • Smokeless tobacco: Never Used   • Tobacco comment: drink caffiene daily   Vaping Use   • Vaping Use: Never used   Substance Use Topics   • Alcohol use: No   • Drug use: Defer     No current facility-administered medications on file prior to encounter.     Current Outpatient Medications on File Prior to Encounter   Medication Sig Dispense Refill   • carvedilol (COREG) 3.125 MG tablet Take 1 tablet by mouth 2 (Two) Times a Day. 60 tablet 11   • conjugated estrogens (PREMARIN) 0.625 MG/GM vaginal cream Insert 0.625 g into the vagina Every Night.      • esomeprazole (NexIUM) 40 MG capsule Take 40 mg by mouth every morning before breakfast.     • fluticasone (FLONASE) 50 MCG/ACT nasal spray 2 sprays into the nostril(s) as directed by provider As Needed.     • levothyroxine  (Synthroid) 50 MCG tablet Take 1 tablet by mouth Daily. 30 tablet 6   • rosuvastatin (CRESTOR) 10 MG tablet TAKE ONE TABLET BY MOUTH DAILY (Patient taking differently: Take 20 mg by mouth Every Night.) 90 tablet 0   • rosuvastatin (CRESTOR) 20 MG tablet TAKE ONE TABLET BY MOUTH ONCE NIGHTLY (Patient taking differently: Take 10 mg by mouth Every Night.) 90 tablet 1   • Cholecalciferol (VITAMIN D3) 2000 units tablet Take  by mouth.       Allergies   Allergen Reactions   • Augmentin [Amoxicillin-Pot Clavulanate] Rash   • Biaxin [Clarithromycin] Rash       Objective    Objective     Vital Signs  Temp:  [97.2 °F (36.2 °C)-98.2 °F (36.8 °C)] 98.2 °F (36.8 °C)  Heart Rate:  [65-82] 72  Resp:  [18-20] 18  BP: (119-152)/(63-91) 134/72  SpO2:  [91 %-98 %] 95 %  on   ;   Device (Oxygen Therapy): room air  Body mass index is 26.38 kg/m².    Physical Exam  Constitutional:       General: She is not in acute distress.     Appearance: She is not ill-appearing.   HENT:      Head: Normocephalic and atraumatic.      Nose: Nose normal.      Mouth/Throat:      Mouth: Mucous membranes are moist.   Eyes:      Extraocular Movements: Extraocular movements intact.      Pupils: Pupils are equal, round, and reactive to light.   Cardiovascular:      Rate and Rhythm: Normal rate and regular rhythm.      Pulses: Normal pulses.      Heart sounds: Normal heart sounds.   Pulmonary:      Effort: Pulmonary effort is normal.   Abdominal:      General: Bowel sounds are normal. There is no distension.      Palpations: Abdomen is soft.      Tenderness: There is abdominal tenderness.   Musculoskeletal:         General: No swelling or tenderness. Normal range of motion.      Cervical back: Neck supple.   Skin:     General: Skin is warm and dry.   Neurological:      General: No focal deficit present.      Mental Status: She is alert and oriented to person, place, and time.   Psychiatric:         Mood and Affect: Mood normal.         Results Review:  I  reviewed the patient's new clinical results.      Lab Results (last 24 hours)     Procedure Component Value Units Date/Time    CBC & Differential [[id number]]  (Abnormal) Collected: 09/04/22 0102    Specimen: Blood Updated: 09/04/22 0116    Narrative:      The following orders were created for panel order CBC & Differential.  Procedure                               Abnormality         Status                     ---------                               -----------         ------                     CBC Auto Differential[[id number]]        Abnormal            Final result                 Please view results for these tests on the individual orders.    Comprehensive Metabolic Panel [[id number]]  (Abnormal) Collected: 09/04/22 0102    Specimen: Blood Updated: 09/04/22 0145     Glucose 121 mg/dL      BUN 14 mg/dL      Creatinine 0.93 mg/dL      Sodium 141 mmol/L      Potassium 4.2 mmol/L      Chloride 106 mmol/L      CO2 25.2 mmol/L      Calcium 9.0 mg/dL      Total Protein 6.8 g/dL      Albumin 4.30 g/dL      ALT (SGPT) 16 U/L      AST (SGOT) 16 U/L      Alkaline Phosphatase 98 U/L      Total Bilirubin 0.3 mg/dL      Globulin 2.5 gm/dL      A/G Ratio 1.7 g/dL      BUN/Creatinine Ratio 15.1     Anion Gap 9.8 mmol/L      eGFR 62.6 mL/min/1.73      Comment: National Kidney Foundation and American Society of Nephrology (ASN) Task Force recommended calculation based on the Chronic Kidney Disease Epidemiology Collaboration (CKD-EPI) equation refit without adjustment for race.       Narrative:      GFR Normal >60  Chronic Kidney Disease <60  Kidney Failure <15      Troponin [[id number]]  (Normal) Collected: 09/04/22 0102    Specimen: Blood Updated: 09/04/22 0145     Troponin T <0.010 ng/mL     Narrative:      Troponin T Reference Range:  <= 0.03 ng/mL-   Negative for AMI  >0.03 ng/mL-     Abnormal for myocardial necrosis.  Clinicians would have to utilize clinical acumen, EKG, Troponin and serial changes to determine if it is an  Acute Myocardial Infarction or myocardial injury due to an underlying chronic condition.       Results may be falsely decreased if patient taking Biotin.      Lipase [[id number]]  (Normal) Collected: 09/04/22 0102    Specimen: Blood Updated: 09/04/22 0155     Lipase 30 U/L     Magnesium [[id number]]  (Normal) Collected: 09/04/22 0102    Specimen: Blood Updated: 09/04/22 0145     Magnesium 2.0 mg/dL     CBC Auto Differential [[id number]]  (Abnormal) Collected: 09/04/22 0102    Specimen: Blood Updated: 09/04/22 0116     WBC 12.46 10*3/mm3      RBC 4.60 10*6/mm3      Hemoglobin 12.7 g/dL      Hematocrit 40.1 %      MCV 87.2 fL      MCH 27.6 pg      MCHC 31.7 g/dL      RDW 14.1 %      RDW-SD 44.7 fl      MPV 9.8 fL      Platelets 303 10*3/mm3      Neutrophil % 76.9 %      Lymphocyte % 14.9 %      Monocyte % 5.1 %      Eosinophil % 2.2 %      Basophil % 0.6 %      Immature Grans % 0.3 %      Neutrophils, Absolute 9.58 10*3/mm3      Lymphocytes, Absolute 1.86 10*3/mm3      Monocytes, Absolute 0.63 10*3/mm3      Eosinophils, Absolute 0.28 10*3/mm3      Basophils, Absolute 0.07 10*3/mm3      Immature Grans, Absolute 0.04 10*3/mm3      nRBC 0.0 /100 WBC     Urinalysis With Microscopic If Indicated (No Culture) - Urine, Clean Catch [[id number]]  (Abnormal) Collected: 09/04/22 0308    Specimen: Urine, Clean Catch Updated: 09/04/22 0322     Color, UA Yellow     Appearance, UA Cloudy     pH, UA <=5.0     Specific Gravity, UA 1.018     Glucose, UA Negative     Ketones, UA Trace     Bilirubin, UA Negative     Blood, UA Small (1+)     Protein, UA Negative     Leuk Esterase, UA Moderate (2+)     Nitrite, UA Negative     Urobilinogen, UA 0.2 E.U./dL    Urinalysis, Microscopic Only - Urine, Clean Catch [[id number]]  (Abnormal) Collected: 09/04/22 0308    Specimen: Urine, Clean Catch Updated: 09/04/22 0322     RBC, UA 3-5 /HPF      WBC, UA 21-30 /HPF      Bacteria, UA None Seen /HPF      Squamous Epithelial Cells, UA 3-6 /HPF      Hyaline  Casts, UA 3-6 /LPF      Methodology Automated Microscopy    Urine Culture - Urine, Urine, Clean Catch [[id number]]  (Normal) Collected: 09/04/22 0308    Specimen: Urine, Clean Catch Updated: 09/04/22 0717     Urine Culture Culture in progress    Basic Metabolic Panel [[id number]]  (Abnormal) Collected: 09/04/22 0549    Specimen: Blood Updated: 09/04/22 0618     Glucose 126 mg/dL      BUN 11 mg/dL      Creatinine 0.83 mg/dL      Sodium 144 mmol/L      Potassium 4.6 mmol/L      Chloride 110 mmol/L      CO2 25.2 mmol/L      Calcium 8.5 mg/dL      BUN/Creatinine Ratio 13.3     Anion Gap 8.8 mmol/L      eGFR 71.8 mL/min/1.73      Comment: National Kidney Foundation and American Society of Nephrology (ASN) Task Force recommended calculation based on the Chronic Kidney Disease Epidemiology Collaboration (CKD-EPI) equation refit without adjustment for race.       Narrative:      GFR Normal >60  Chronic Kidney Disease <60  Kidney Failure <15            Imaging Results (Last 24 Hours)     Procedure Component Value Units Date/Time    FL Cholangiogram Operative [[id number]] Resulted: 09/04/22 1018     Updated: 09/04/22 1018    US Gallbladder [[id number]] Collected: 09/04/22 0428     Updated: 09/04/22 0443    Narrative:      GALLBLADDER ULTRASOUND     HISTORY: Abnormal CT     COMPARISON: 09/04/2022     TECHNIQUE: Grayscale and color Doppler sonographic images were obtained  through the right upper quadrant.     FINDINGS:  Visualized portions of the pancreas are unremarkable. No suspicious  hepatic lesions are seen. Within the mellissa hepatis, the common hepatic  duct measures up to 7 mm, although it does taper to 4 mm distally.  Patient's gallbladder is distended. However, no stones are seen. There  is some gallbladder wall thickening, measuring up to 5 mm. No  pericholecystic fluid is seen on these images. No sonographic Martin's  sign was elicited. Right kidney measures 9.8 x 3.7 x 4.4 cm. No  hydronephrosis is seen. Renal  echotexture is normal.       Impression:      Gallbladder is again noted to be distended. There is some gallbladder  wall thickening, although no gallstones or pericholecystic fluid is seen  on the current study. However, given the findings on CT, as well as  history of upper abdominal pain and nausea, acute cholecystitis remains  in the differential. HIDA scan could be considered if clinical doubt  persists.     This report was finalized on 9/4/2022 4:40 AM by Dr. Yee Rosenbaum M.D.       CT Abdomen Pelvis With Contrast [[id number]] Collected: 09/04/22 0326     Updated: 09/04/22 0338    Narrative:      CT OF THE ABDOMEN AND PELVIS WITH CONTRAST     HISTORY: Epigastric abdominal pain     COMPARISON: None available.     TECHNIQUE: Axial CT imaging was obtained through the abdomen and pelvis.  IV contrast was administered.     FINDINGS:  Images through the lung bases straight dependent atelectasis. There is  probably also some scarring. No suspicious hepatic lesions are seen.  Adrenal glands, spleen, stomach, and pancreas appear unremarkable. The  patient has 2 small duodenal diverticula. The patient's gallbladder is  distended. There does appear to be some trace pericholecystic fluid.  Discrete stones are difficult to appreciate, although I think the  patient may have at least one within the cystic duct. Overall appearance  is suspicious for acute cholecystitis. Correlation with gallbladder  ultrasound is recommended. There is no intra or extrahepatic biliary  dilatation. The kidneys enhance symmetrically. There is no  hydronephrosis. Tiny cyst is noted within the right lobe of the liver.  No additional follow-up is necessary. There is calcification of the  aorta. Patient does have a somewhat thick-walled appearance to the  urinary bladder, with some perivesical soft tissue stranding.  Correlation with urinalysis and urine cultures is recommended. Pessary  device is noted within the pelvis. There is colonic  diverticulosis.  There is no bowel obstruction. There is some mild inflammatory stranding  which is seen adjacent to the hepatic flexure of the colon, likely  secondary to inflammation within the gallbladder fossa. Patient does  have a small amount of free fluid which is seen within the lower right  paracolic gutter. This may be reactive. The appendix is surgically  absent per history. No acute osseous abnormalities are seen.       Impression:         1. The patient's gallbladder is distended. There is trace  pericholecystic fluid, and there is potentially a stone lodged within  the cystic duct. Appearance is concerning for acute cholecystitis.  Gallbladder ultrasound would be confirmatory.  2. Thick-walled appearance to the urinary bladder, with perivesical soft  tissue stranding. Correlation with urinalysis and urine cultures is  recommended.     Radiation dose reduction techniques were utilized, including automated  exposure control and exposure modulation based on body size.     This report was finalized on 9/4/2022 3:35 AM by Dr. Yee Rosenbaum M.D.             Results for orders placed during the hospital encounter of 05/17/22    Adult Transthoracic Echo Complete W/ Cont if Necessary Per Protocol    Interpretation Summary  · Left ventricular wall thickness is consistent with hypertrophy. Sigmoid-shaped ventricular septum is present.  · Left ventricular diastolic function is consistent with (grade I) impaired relaxation.  · The following left ventricular wall segments are hypokinetic: apical anterior, apical septal and mid anteroseptal.  · Estimated left ventricular EF was in disagreement with the calculated left ventricular EF. Left ventricular ejection fraction appears to be 46 - 50%. Left ventricular systolic function is low normal.      ECG 12 Lead   Preliminary Result   HEART RATE= 66  bpm   RR Interval= 909  ms   AR Interval= 169  ms   P Horizontal Axis= -6  deg   P Front Axis= 44  deg   QRSD  Interval= 105  ms   QT Interval= 401  ms   QRS Axis= -7  deg   T Wave Axis= 0  deg   - ABNORMAL ECG -   Sinus rhythm   Nonspecific repolarization abnormalities   Electronically Signed By:    Date and Time of Study: 2022-09-04 00:17:41           Assessment/Plan     Active Hospital Problems    Diagnosis  POA   • Acute cholecystitis [K81.0]  Yes   • Cardiomyopathy (HCC) [I42.9]  Unknown   • Essential hypertension [I10]  Unknown   • Gastro-esophageal reflux disease without esophagitis [K21.9]  Yes      Resolved Hospital Problems   No resolved problems to display.     Lipase normal  white count 12.4 with left shift  UA positive for ketones blood and white cells, negative bacteria.  Reflex culture pending.  No urinary tract symptoms.  Ct shows acute nohemy, GB US-distended, wall thickening, no stone.  General surgery has been consulted.  Received cefepime and Flagyl.  Keep n.p.o. with IV fluids.      · I discussed the patient's findings and my recommendations with patient and family.    VTE Prophylaxis - SCDs.  Code Status - Full code.       MARISEL White  [facility]  09/04/22  10:19 EDT

## 2022-09-04 NOTE — OP NOTE
OPERATIVE REPORT     DATE OF OPERATION: 09/04/22     SURGEON:   Rl Ferro MD    ASSISTANT:  Assistant: Rojas Corona CSA was responsible for performing the following activities: Retraction, Suturing, Closing and Placing Dressing and their skilled assistance was necessary for the success of this case.      PREOPERATIVE DIAGNOSIS: Acute cholecystitis    POSTOPERATIVE DIAGNOSIS: Acute cholecystitis    PROCEDURE PERFORMED: Laparoscopic cholecystectomy       ANESTHESIA: General    SPECIMEN: Gallbladder    DRAINS: None    BLOOD LOSS: 20 cc    INDICATIONS FOR OPERATION: Miss Codie Munson is a 77 y.o. year old with clinical findings consistent with acute cholecystitis. Laparoscopic cholecystectomy with cholangiogram was recommended. All risks (including bleeding, infection, damage to surrounding structures, bile duct injury, and bile leak), benefits, and alternatives were explained to the patient and she agreed and wished to proceed.  Informed consent was obtained.    FINDINGS:   1. Critical view of safety prior to attempting cholangiogram  2. Cholangiogram catheter pushed through the cystic duct within 1 cm of where it joined the common bile duct.  Cholangiogram aborted and clips placed across cystic duct.  Short segment of common hepatic and common bile duct visualized and did not appear dilated.    OPERATIVE REPORT: The patient was taken to the operating room, transferred onto the operating room table, and underwent general endotracheal anesthesia without incident. The patient was prepped and draped in the usual sterile fashion.  Preoperative antibiotics were given, and a timeout was performed.  Half percent Marcaine with epinephrine was and injected into the skin and subcutaneous tissues prior to all incisions.  After confirmation of an orogastric tube being placed, a veress needle was inserted at doherty's point.  The abdomen was then entered through a periumbilical incision using an optiview  technique.  The viscera underlying the veress needle was inspected for evidence of injury and the veress needle withdrawn.  2 5 mm additional trocars were placed in the right upper quadrant and an 11 mm trocar was placed below the xyphoid under direct visualization.  The gallbladder was distended and tense and was decompressed with the aspiration needle.  The gallbladder was retracted cranially and laterally to allow for adequate visualization.  The area around the infundibulum was dissected with hook cautery and blunt dissection until the cystic duct and artery were identified. A critical view of safety was obtained. A cholangiogram was done by placing a clip on the cystic duct and incising it just distal to this.  A cholangiogram catheter was introduced with an Angiocath needle and directed into the cystic duct.  A clip was applied.  A cholangiogram was attempted and there was noted to be spillage of the contrast throughout the right upper quadrant on x-ray.  The abdomen was reinsufflated and the cholangiogram catheter was noted to be passing through the cystic duct within 1 cm of its junction with the common bile duct.  The decision was made to abort the cholangiogram and 3 clips were placed between where the cholangiogram catheter terminated and where the cystic duct joined with the common hepatic duct.  The cholangiogram catheter was removed.  The cystic duct was divided.  Cystic artery had 3  branches entering the gallbladder in a distributive fashion and these were clipped and divided.   Bovie electrocautery was then used to remove the gallbladder from the liver bed.  The gallbladder was placed into a bag and removed from the abdomen. Hemostasis was obtained with the hook cautery. The area was then irrigated and inspected for bleeding and bile leak.  No bleeding or bile was noted.    A 0 vicryl was placed using a suture passer to reapproximate the fascia at the subxiphoid trocar site.The 11 mm trocar was  reinserted and the remaining ports removed under direct visualization.  The subxiphoid trocar was removed, insufflation evacuated, and the fascial stitch tied after ensuring no herniation of bowel or omentum. The ports were removed under direct visualization. The incisions were then closed with 4-0 monocryl sutures and Dermabond.  All needle and lap counts were correct at the end of the case.  The patient was awoken from general endotracheal anesthesia and taken to the recovery area for further monitoring.    Rl Ferro M.D.  General and Endoscopic Surgery  [facility]    [address], [address]  [place], [place], [address]  P: [phone]  F: [phone]

## 2022-09-04 NOTE — ANESTHESIA PREPROCEDURE EVALUATION
Anesthesia Evaluation     Patient summary reviewed and Nursing notes reviewed   NPO Solid Status: > 8 hours  NPO Liquid Status: > 8 hours           Airway   Mallampati: II  TM distance: >3 FB  Neck ROM: full  No difficulty expected  Dental - normal exam     Pulmonary - normal exam   Cardiovascular - normal exam  Exercise tolerance: good (4-7 METS)    ECG reviewed  Patient on routine beta blocker and Beta blocker given within 24 hours of surgery    (+) hypertension, hyperlipidemia,     ROS comment: 5/11/2022  ECHO    ·Left ventricular wall thickness is consistent with hypertrophy. Sigmoid-shaped ventricular septum is present.  ·Left ventricular diastolic function is consistent with (grade I) impaired relaxation.  ·The following left ventricular wall segments are hypokinetic: apical anterior, apical septal and mid anteroseptal.  ·Estimated left ventricular EF was in disagreement with the calculated left ventricular EF. Left ventricular ejection fraction appears to be 46 - 50%. Left ventricular systolic function is low normal.        Neuro/Psych  (+) psychiatric history Anxiety and Depression,    GI/Hepatic/Renal/Endo    (+)  hiatal hernia, GERD, PUD,  thyroid problem hypothyroidism    Musculoskeletal     Abdominal    Substance History      OB/GYN          Other   arthritis,                      Anesthesia Plan    ASA 3 - emergent     general     intravenous induction     Anesthetic plan, risks, benefits, and alternatives have been provided, discussed and informed consent has been obtained with: patient.    Plan discussed with attending.        CODE STATUS:    Code Status (Patient has no pulse and is not breathing): CPR (Attempt to Resuscitate)  Medical Interventions (Patient has pulse or is breathing): Full Support

## 2022-09-04 NOTE — ANESTHESIA PROCEDURE NOTES
Airway  Urgency: elective    Date/Time: 9/4/2022 9:33 AM  End Time:9/4/2022 9:33 AM  Airway not difficult    General Information and Staff    Patient location during procedure: OR  Anesthesiologist: Ivan Teague MD    Indications and Patient Condition  Indications for airway management: airway protection    Preoxygenated: yes  Mask difficulty assessment: 0 - not attempted    Final Airway Details  Final airway type: endotracheal airway      Successful airway: ETT  Cuffed: yes   Successful intubation technique: direct laryngoscopy  Endotracheal tube insertion site: oral  Blade: Wright  Blade size: 2  ETT size (mm): 7.0  Cormack-Lehane Classification: grade I - full view of glottis  Placement verified by: chest auscultation and capnometry   Number of attempts at approach: 1  Assessment: lips, teeth, and gum same as pre-op and atraumatic intubation

## 2022-09-04 NOTE — ED PROVIDER NOTES
EMERGENCY DEPARTMENT ENCOUNTER    Room Number:  07/07  Date of encounter:  9/4/2022  PCP: Sri Leach PA-C  Historian: Patient      HPI:  Chief Complaint: Abdominal pain  A complete HPI/ROS/PMH/PSH/SH/FH are unobtainable due to: N/A    Context: Codie Munson is a 77 y.o. female with past medical history of cardiomyopathy, HLD, hypothyroidism, and GERD who presents to the ED c/o abdominal pain.  Patient states that around 8:00 this evening, after eating dinner, she developed severe epigastric pain that radiated into her back.  Patient states that it felt like a tight band around her abdomen and was constant.  Pain was associated with nausea, but she denies any fever, chills, vomiting, diarrhea, constipation, or dysuria.  Patient states that she took some Tums without relief, nothing seemed to make the pain better.      PAST MEDICAL HISTORY  Active Ambulatory Problems     Diagnosis Date Noted   • Myocardiopathy (HCC) 02/18/2016   • Bladder infection, chronic 02/18/2016   • Blues 02/18/2016   • Gastro-esophageal reflux disease without esophagitis 02/18/2016   • HLD (hyperlipidemia) 02/18/2016   • Prolapse of urethra 02/18/2016   • Incomplete bladder emptying 02/18/2016   • Gastroesophageal reflux disease with hiatal hernia 02/19/2016   • Erosive gastritis 02/19/2016   • Diverticulosis of large intestine without hemorrhage 02/19/2016   • Chronic UTI 02/19/2016   • Foot sprain 03/24/2017   • Medicare annual wellness visit, subsequent 07/20/2018   • Screening mammogram, encounter for 07/20/2018   • Lower abdominal pain 09/17/2018   • Hematuria 09/17/2018   • Left knee pain 09/17/2018   • Chondromalacia of patellofemoral joint, left 10/09/2018   • Acute left ankle pain 12/31/2018   • Varicose veins of both lower extremities 12/31/2018   • Pain and swelling of left lower leg 12/31/2018   • Postmenopausal 10/24/2019   • Hiatal hernia 12/17/2012   • Diverticulitis of colon 12/17/2012   • Acquired hypothyroidism  2022   • PVC (premature ventricular contraction) 07/15/2022     Resolved Ambulatory Problems     Diagnosis Date Noted   • Cough 2016   • BP (high blood pressure) 2016   • Elevated BP 2016   • Acute maxillary sinusitis 2019     Past Medical History:   Diagnosis Date   • Cardiomyopathy (HCC)    • Chronic cystitis    • Depression    • GERD (gastroesophageal reflux disease)    • Hyperlipidemia    • Hypertension    • Scarlet fever    • Strain of thoracic region    • Urethral prolapse    • URI (upper respiratory infection)    • Urinary retention          PAST SURGICAL HISTORY  Past Surgical History:   Procedure Laterality Date   • APPENDECTOMY     • BLADDER SURGERY     • CARDIAC CATHETERIZATION      Normal. Performed at [facility].   •  SECTION     • VEIN LIGATION AND STRIPPING           FAMILY HISTORY  Family History   Problem Relation Age of Onset   • Heart failure Mother    • Cancer Mother         Bladder   • Kidney failure Mother    • Aneurysm Father    • Heart disease Father         CABG   • Stroke Father    • Crohn's disease Sister    • Prostate cancer Brother    • Cancer Brother         bladder   • Breast cancer Neg Hx          SOCIAL HISTORY  Social History     Socioeconomic History   • Marital status:    Tobacco Use   • Smoking status: Never Smoker   • Smokeless tobacco: Never Used   • Tobacco comment: drink caffiene daily   Vaping Use   • Vaping Use: Never used   Substance and Sexual Activity   • Alcohol use: No   • Drug use: Defer   • Sexual activity: Defer         ALLERGIES  Augmentin [amoxicillin-pot clavulanate] and Biaxin [clarithromycin]        REVIEW OF SYSTEMS  Review of Systems     All systems reviewed and negative except for those discussed in HPI.       PHYSICAL EXAM    I have reviewed the triage vital signs and nursing notes.    ED Triage Vitals [22 0010]   Temp Heart Rate Resp BP SpO2   97.3 °F (36.3 °C) 66 20 -- 98 %      Temp src  Heart Rate Source Patient Position BP Location FiO2 (%)   Tympanic Monitor -- -- --       Physical Exam  GENERAL: Alert and orient x3, not distressed  HENT: nares patent  EYES: no scleral icterus  CV: regular rhythm, regular rate  RESPIRATORY: normal effort  ABDOMEN: moderate epigastric tenderness with guarding, no rebound, normal bowel sounds  MUSCULOSKELETAL: no deformity  NEURO: alert, moves all extremities, follows commands  SKIN: warm, dry, no rashes      LAB RESULTS  Recent Results (from the past 24 hour(s))   ECG 12 Lead    Collection Time: 09/04/22 12:17 AM   Result Value Ref Range    QT Interval 401 ms   Comprehensive Metabolic Panel    Collection Time: 09/04/22  1:02 AM    Specimen: Blood   Result Value Ref Range    Glucose 121 (H) 65 - 99 mg/dL    BUN 14 8 - 23 mg/dL    Creatinine 0.93 0.57 - 1.00 mg/dL    Sodium 141 136 - 145 mmol/L    Potassium 4.2 3.5 - 5.2 mmol/L    Chloride 106 98 - 107 mmol/L    CO2 25.2 22.0 - 29.0 mmol/L    Calcium 9.0 8.6 - 10.5 mg/dL    Total Protein 6.8 6.0 - 8.5 g/dL    Albumin 4.30 3.50 - 5.20 g/dL    ALT (SGPT) 16 1 - 33 U/L    AST (SGOT) 16 1 - 32 U/L    Alkaline Phosphatase 98 39 - 117 U/L    Total Bilirubin 0.3 0.0 - 1.2 mg/dL    Globulin 2.5 gm/dL    A/G Ratio 1.7 g/dL    BUN/Creatinine Ratio 15.1 7.0 - 25.0    Anion Gap 9.8 5.0 - 15.0 mmol/L    eGFR 62.6 >60.0 mL/min/1.73   Troponin    Collection Time: 09/04/22  1:02 AM    Specimen: Blood   Result Value Ref Range    Troponin T <0.010 0.000 - 0.030 ng/mL   Lipase    Collection Time: 09/04/22  1:02 AM    Specimen: Blood   Result Value Ref Range    Lipase 30 13 - 60 U/L   Magnesium    Collection Time: 09/04/22  1:02 AM    Specimen: Blood   Result Value Ref Range    Magnesium 2.0 1.6 - 2.4 mg/dL   CBC Auto Differential    Collection Time: 09/04/22  1:02 AM    Specimen: Blood   Result Value Ref Range    WBC 12.46 (H) 3.40 - 10.80 10*3/mm3    RBC 4.60 3.77 - 5.28 10*6/mm3    Hemoglobin 12.7 12.0 - 15.9 g/dL    Hematocrit 40.1  34.0 - 46.6 %    MCV 87.2 79.0 - 97.0 fL    MCH 27.6 26.6 - 33.0 pg    MCHC 31.7 31.5 - 35.7 g/dL    RDW 14.1 12.3 - 15.4 %    RDW-SD 44.7 37.0 - 54.0 fl    MPV 9.8 6.0 - 12.0 fL    Platelets 303 140 - 450 10*3/mm3    Neutrophil % 76.9 (H) 42.7 - 76.0 %    Lymphocyte % 14.9 (L) 19.6 - 45.3 %    Monocyte % 5.1 5.0 - 12.0 %    Eosinophil % 2.2 0.3 - 6.2 %    Basophil % 0.6 0.0 - 1.5 %    Immature Grans % 0.3 0.0 - 0.5 %    Neutrophils, Absolute 9.58 (H) 1.70 - 7.00 10*3/mm3    Lymphocytes, Absolute 1.86 0.70 - 3.10 10*3/mm3    Monocytes, Absolute 0.63 0.10 - 0.90 10*3/mm3    Eosinophils, Absolute 0.28 0.00 - 0.40 10*3/mm3    Basophils, Absolute 0.07 0.00 - 0.20 10*3/mm3    Immature Grans, Absolute 0.04 0.00 - 0.05 10*3/mm3    nRBC 0.0 0.0 - 0.2 /100 WBC   Urinalysis With Microscopic If Indicated (No Culture) - Urine, Clean Catch    Collection Time: 09/04/22  3:08 AM    Specimen: Urine, Clean Catch   Result Value Ref Range    Color, UA Yellow Yellow, Straw    Appearance, UA Cloudy (A) Clear    pH, UA <=5.0 5.0 - 8.0    Specific Gravity, UA 1.018 1.005 - 1.030    Glucose, UA Negative Negative    Ketones, UA Trace (A) Negative    Bilirubin, UA Negative Negative    Blood, UA Small (1+) (A) Negative    Protein, UA Negative Negative    Leuk Esterase, UA Moderate (2+) (A) Negative    Nitrite, UA Negative Negative    Urobilinogen, UA 0.2 E.U./dL 0.2 - 1.0 E.U./dL   Urinalysis, Microscopic Only - Urine, Clean Catch    Collection Time: 09/04/22  3:08 AM    Specimen: Urine, Clean Catch   Result Value Ref Range    RBC, UA 3-5 (A) None Seen, 0-2 /HPF    WBC, UA 21-30 (A) None Seen, 0-2 /HPF    Bacteria, UA None Seen None Seen /HPF    Squamous Epithelial Cells, UA 3-6 (A) None Seen, 0-2 /HPF    Hyaline Casts, UA 3-6 None Seen /LPF    Methodology Automated Microscopy        Ordered the above labs and independently reviewed the results.        RADIOLOGY  CT Abdomen Pelvis With Contrast    Result Date: 9/4/2022  CT OF THE ABDOMEN AND  PELVIS WITH CONTRAST  HISTORY: Epigastric abdominal pain  COMPARISON: None available.  TECHNIQUE: Axial CT imaging was obtained through the abdomen and pelvis. IV contrast was administered.  FINDINGS: Images through the lung bases straight dependent atelectasis. There is probably also some scarring. No suspicious hepatic lesions are seen. Adrenal glands, spleen, stomach, and pancreas appear unremarkable. The patient has 2 small duodenal diverticula. The patient's gallbladder is distended. There does appear to be some trace pericholecystic fluid. Discrete stones are difficult to appreciate, although I think the patient may have at least one within the cystic duct. Overall appearance is suspicious for acute cholecystitis. Correlation with gallbladder ultrasound is recommended. There is no intra or extrahepatic biliary dilatation. The kidneys enhance symmetrically. There is no hydronephrosis. Tiny cyst is noted within the right lobe of the liver. No additional follow-up is necessary. There is calcification of the aorta. Patient does have a somewhat thick-walled appearance to the urinary bladder, with some perivesical soft tissue stranding. Correlation with urinalysis and urine cultures is recommended. Pessary device is noted within the pelvis. There is colonic diverticulosis. There is no bowel obstruction. There is some mild inflammatory stranding which is seen adjacent to the hepatic flexure of the colon, likely secondary to inflammation within the gallbladder fossa. Patient does have a small amount of free fluid which is seen within the lower right paracolic gutter. This may be reactive. The appendix is surgically absent per history. No acute osseous abnormalities are seen.       1. The patient's gallbladder is distended. There is trace pericholecystic fluid, and there is potentially a stone lodged within the cystic duct. Appearance is concerning for acute cholecystitis. Gallbladder ultrasound would be confirmatory.  2. Thick-walled appearance to the urinary bladder, with perivesical soft tissue stranding. Correlation with urinalysis and urine cultures is recommended.  Radiation dose reduction techniques were utilized, including automated exposure control and exposure modulation based on body size.  This report was finalized on 9/4/2022 3:35 AM by Dr. Yee Rosenbaum M.D.      US Gallbladder    Result Date: 9/4/2022  GALLBLADDER ULTRASOUND  HISTORY: Abnormal CT  COMPARISON: 09/04/2022  TECHNIQUE: Grayscale and color Doppler sonographic images were obtained through the right upper quadrant.  FINDINGS: Visualized portions of the pancreas are unremarkable. No suspicious hepatic lesions are seen. Within the mellissa hepatis, the common hepatic duct measures up to 7 mm, although it does taper to 4 mm distally. Patient's gallbladder is distended. However, no stones are seen. There is some gallbladder wall thickening, measuring up to 5 mm. No pericholecystic fluid is seen on these images. No sonographic Martin's sign was elicited. Right kidney measures 9.8 x 3.7 x 4.4 cm. No hydronephrosis is seen. Renal echotexture is normal.      Gallbladder is again noted to be distended. There is some gallbladder wall thickening, although no gallstones or pericholecystic fluid is seen on the current study. However, given the findings on CT, as well as history of upper abdominal pain and nausea, acute cholecystitis remains in the differential. HIDA scan could be considered if clinical doubt persists.  This report was finalized on 9/4/2022 4:40 AM by Dr. Yee Rosenbaum M.D.        I ordered the above noted radiological studies. Reviewed by me and discussed with radiologist.  See dictation for official radiology interpretation.      PROCEDURES    Procedures      MEDICATIONS GIVEN IN ER    Medications   sodium chloride 0.9 % flush 10 mL (has no administration in time range)   cefepime 2 gm IVPB in 100 ml NS (VTB) (2 g Intravenous New Bag 9/4/22 0535)    metroNIDAZOLE (FLAGYL) IVPB 500 mg (has no administration in time range)   sodium chloride 0.9 % flush 10 mL (has no administration in time range)   sodium chloride 0.9 % flush 10 mL (has no administration in time range)   sodium chloride 0.9 % infusion (has no administration in time range)   acetaminophen (TYLENOL) tablet 650 mg (has no administration in time range)     Or   acetaminophen (TYLENOL) 160 MG/5ML solution 650 mg (has no administration in time range)     Or   acetaminophen (TYLENOL) suppository 650 mg (has no administration in time range)   ondansetron (ZOFRAN) injection 4 mg (has no administration in time range)   HYDROmorphone (DILAUDID) injection 0.5 mg (has no administration in time range)     And   naloxone (NARCAN) injection 0.4 mg (has no administration in time range)   sodium chloride 0.9 % bolus 500 mL (0 mL Intravenous Stopped 9/4/22 0307)   ondansetron (ZOFRAN) injection 4 mg (4 mg Intravenous Given 9/4/22 0104)   morphine injection 2 mg (2 mg Intravenous Given 9/4/22 0105)   iopamidol (ISOVUE-300) 61 % injection 100 mL (85 mL Intravenous Given by Other 9/4/22 0319)   ondansetron (ZOFRAN) injection 4 mg (4 mg Intravenous Given 9/4/22 0542)         PROGRESS, DATA ANALYSIS, CONSULTS, AND MEDICAL DECISION MAKING    All labs have been independently reviewed by me.  All radiology studies have been reviewed by me and discussed with radiologist dictating the report.   EKG's independently viewed and interpreted by me.  Discussion below represents my analysis of pertinent findings related to patient's condition, differential diagnosis, treatment plan and final disposition.    DDx: Includes but not limited to pancreatitis, gastritis, GERD, cholecystitis, cholelithiasis, gastroenteritis    ED Course as of 09/04/22 0547   Sun Sep 04, 2022   0118 EKG          EKG time: 00:17  Rhythm/Rate: Sinus rhythm at 66 bpm  P waves and IL: Normal  QRS, axis: Normal  ST and T waves: Nonspecific T wave changes  lateral leads, no acute ST/T wave changes    Interpreted Contemporaneously by me, independently viewed  Not significantly changed compared to prior EKG of 6/15/2022.   [KATHY]   0210 WBC(!): 12.46 [KATHY]   0210 Hemoglobin: 12.7 [KATHY]   0210 Hematocrit: 40.1 [KATHY]   0210 Platelets: 303 [KATHY]   0210 Glucose(!): 121 [KATHY]   0210 BUN: 14 [KATHY]   0210 Creatinine: 0.93 [KATHY]   0210 Sodium: 141 [KATHY]   0210 Potassium: 4.2 [KATHY]   0210 Magnesium: 2.0 [KATHY]   0210 Chloride: 106 [KATHY]   0210 CO2: 25.2 [KATHY]   0210 ALT (SGPT): 16 [KATHY]   0210 AST (SGOT): 16 [KATHY]   0210 Total Bilirubin: 0.3 [KATHY]   0210 Lipase: 30 [KATHY]   0210 Troponin T: <0.010 [KATHY]   0326 Leukocytes, UA(!): Moderate (2+) [KATHY]   0326 Nitrite, UA: Negative [KATHY]   0326 RBC, UA(!): 3-5 [KATHY]   0326 WBC, UA(!): 21-30 [KATHY]   0326 Bacteria, UA: None Seen [KATHY]   0334 Patient rechecked, pain is improved after IV analgesics and fluids.  Discussed results to include a mildly elevated WBC count, and CT scan that is concerning for possible cholecystitis. [KATHY]   0529 Patient history, ER presentation and evaluation discussed with Dr. Ferro, recommended to keep the patient n.p.o., start on cefepime and Flagyl, and admit to medicine. [KATHY]   0533 Patient rechecked, complaining of some nausea.  Discussed results, diagnosis, and plan for admission for surgical consultation and possible cholecystectomy.  Patient expressed understanding and agrees with plan. [KATHY]   0544 Patient history, ER presentation and evaluation discussed with Kelly JOINER, will place in observation to a [facility] bed per Dr. Tang. [KATHY]      ED Course User Index  [KATHY] Edinson Strickland PA       MDM: Patient's LFTs are normal, WBC count is 12, CT and ultrasound findings are concerning for acute cholecystitis.  Surgery has been consulted, and patient will be admitted for further evaluation and treatment.    PPE: The patient wore a surgical mask throughout the entire patient encounter. I wore an N95.    AS OF 05:47 EDT  VITALS:    BP - 135/79  HR - 74  TEMP - 97.3 °F (36.3 °C) (Tympanic)  O2 SATS - 91%        DIAGNOSIS  Final diagnoses:   Acute cholecystitis   Hyperlipidemia, unspecified hyperlipidemia type   Hypothyroidism, unspecified type   History of cardiomyopathy         DISPOSITION  ADMISSION    Discussed treatment plan and reason for admission with pt/family and admitting physician.  Pt/family voiced understanding of the plan for admission for further testing/treatment as needed.                   Note Disclaimer: At [facility], we believe that sharing information builds trust and better relationships. You are receiving this note because you recently visited [facility]. It is possible you will see health information before a provider has talked with you about it. This kind of information can be easy to misunderstand. To help you fully understand what it means for your health, we urge you to discuss this note with your provider.       Edinson tSrickland PA  09/04/22 0548

## 2022-09-04 NOTE — ANESTHESIA POSTPROCEDURE EVALUATION
"Patient: Codie Munson    Procedure Summary     Date: 09/04/22 Room / Location: [facility] OR [address] [place] [address] MAIN OR    Anesthesia Start: 0927 Anesthesia Stop: 1051    Procedure: CHOLECYSTECTOMY LAPAROSCOPIC INTRAOPERATIVE CHOLANGIOGRAM (N/A Abdomen) Diagnosis:       Acute cholecystitis      (Acute cholecystitis [K81.0])    Surgeons: Rl Ferro MD Provider: Ivan Teague MD    Anesthesia Type: general ASA Status: 3 - Emergent          Anesthesia Type: general    Vitals  Vitals Value Taken Time   /72 09/04/22 1130   Temp 36.4 °C (97.6 °F) 09/04/22 1047   Pulse 65 09/04/22 1131   Resp 16 09/04/22 1130   SpO2 98 % 09/04/22 1131   Vitals shown include unvalidated device data.        Post Anesthesia Care and Evaluation    Patient location during evaluation: bedside  Patient participation: complete - patient participated  Level of consciousness: awake and alert  Pain management: adequate    Airway patency: patent  Anesthetic complications: No anesthetic complications  PONV Status: controlled  Cardiovascular status: acceptable  Respiratory status: acceptable  Hydration status: acceptable    Comments: /72 (BP Location: Left arm, Patient Position: Lying)   Pulse 60   Temp 36.4 °C (97.6 °F) (Oral)   Resp 16   Ht 162.6 cm (64\")   Wt 69.7 kg (153 lb 10.6 oz)   SpO2 98%   BMI 26.38 kg/m²         "

## 2022-09-04 NOTE — CONSULTS
General Surgery H&P/Consultation      Impression/Plan: 77-year-old lady with acute cholecystitis.  Risk, benefits, and alternatives to laparoscopic cholecystectomy with cholangiogram were discussed with her.  She is in agreement to proceed.  Informed consent was obtained.  Continue antibiotics.    CC: Abdominal pain    HPI:   Miss Codie Munson is a 77 y.o. female that presented to the hospital with acute onset upper abdominal pain.  This began yesterday evening and was abrupt and she felt pain underneath her ribs with associated nausea.  She presented to the emergency room and underwent a work-up with a CT of the abdomen pelvis as well as an ultrasound.  These demonstrated a distended gallbladder with gallbladder wall thickening and pericholecystic fluid.  Given the clinical findings of acute cholecystitis I was consulted for further care.  She has a history of an open appendectomy, , laparoscopic bladder prolapse repair.    Past Medical History:   Past Medical History:   Diagnosis Date   • Cardiomyopathy (HCC)    • Chronic cystitis    • Cough    • Depression    • GERD (gastroesophageal reflux disease)    • Hyperlipidemia    • Hypertension    • Scarlet fever    • Strain of thoracic region    • Urethral prolapse    • URI (upper respiratory infection)    • Urinary retention        Past Surgical History:   Past Surgical History:   Procedure Laterality Date   • APPENDECTOMY     • BLADDER SURGERY     • CARDIAC CATHETERIZATION      Normal. Performed at [facility].   •  SECTION     • COLONOSCOPY     • HYSTERECTOMY     • VEIN LIGATION AND STRIPPING         Medications:  Medications Prior to Admission   Medication Sig Dispense Refill Last Dose   • carvedilol (COREG) 3.125 MG tablet Take 1 tablet by mouth 2 (Two) Times a Day. 60 tablet 11 9/3/2022 at Unknown time   • conjugated estrogens (PREMARIN) 0.625 MG/GM vaginal cream Insert 0.625 g into the vagina Every Night.     9/3/2022 at Unknown time   • esomeprazole (NexIUM) 40 MG capsule Take 40 mg by mouth every morning before breakfast.   9/3/2022 at Unknown time   • fluticasone (FLONASE) 50 MCG/ACT nasal spray 2 sprays into the nostril(s) as directed by provider As Needed.   Past Week at Unknown time   • levothyroxine (Synthroid) 50 MCG tablet Take 1 tablet by mouth Daily. 30 tablet 6 9/3/2022 at Unknown time   • rosuvastatin (CRESTOR) 10 MG tablet TAKE ONE TABLET BY MOUTH DAILY (Patient taking differently: Take 20 mg by mouth Every Night.) 90 tablet 0 9/3/2022 at Unknown time   • rosuvastatin (CRESTOR) 20 MG tablet TAKE ONE TABLET BY MOUTH ONCE NIGHTLY (Patient taking differently: Take 10 mg by mouth Every Night.) 90 tablet 1 9/3/2022 at Unknown time   • Cholecalciferol (VITAMIN D3) 2000 units tablet Take  by mouth.   More than a month at Unknown time       Allergies:   Allergies   Allergen Reactions   • Augmentin [Amoxicillin-Pot Clavulanate] Rash   • Biaxin [Clarithromycin] Rash       Social History:   Social History     Socioeconomic History   • Marital status:    Tobacco Use   • Smoking status: Never Smoker   • Smokeless tobacco: Never Used   • Tobacco comment: drink caffiene daily   Vaping Use   • Vaping Use: Never used   Substance and Sexual Activity   • Alcohol use: No   • Drug use: Defer   • Sexual activity: Defer       Family History:   Family History   Problem Relation Age of Onset   • Heart failure Mother    • Cancer Mother         Bladder   • Kidney failure Mother    • Aneurysm Father    • Heart disease Father         CABG   • Stroke Father    • Crohn's disease Sister    • Prostate cancer Brother    • Cancer Brother         bladder   • Breast cancer Neg Hx        Review of Systems:  A comprehensive review of systems was negative except for the following positives: Abdominal pain, nausea    Physical Exam:   Vitals:    09/04/22 0717   BP: 119/71   Pulse: 71   Resp: 18   Temp: 97.2 °F (36.2 °C)   SpO2: 93%     BMI:  Body mass index is 26.38 kg/m².   GENERAL: no acute distress, awake and alert  HEENT: normocephalic, atraumatic, no scleral icterus, moist mucous membranes  NECK: Supple, there is no thyromegaly or lymphadenopathy  RESPIRATORY: symmetric excursion bilaterally, normal work of breathing, no wheezes  CARDIOVASCULAR: regular rate, well perfused  GASTROINTESTINAL: Soft, nontender to palpation, negative Martin sign  MUSCULOSKELETAL: no cyanosis, clubbing, or edema  NEUROLOGIC: alert and oriented, normal speech, cranial nerves 2-12 grossly intact, no focal deficits  SKIN: Moist, warm, no rashes, no jaundice      Pertinent labs:   Results from last 7 days   Lab Units 09/04/22  0102   WBC 10*3/mm3 12.46*   HEMOGLOBIN g/dL 12.7   HEMATOCRIT % 40.1   PLATELETS 10*3/mm3 303     Results from last 7 days   Lab Units 09/04/22  0549 09/04/22  0102   SODIUM mmol/L 144 141   POTASSIUM mmol/L 4.6 4.2   CHLORIDE mmol/L 110* 106   CO2 mmol/L 25.2 25.2   BUN mg/dL 11 14   CREATININE mg/dL 0.83 0.93   CALCIUM mg/dL 8.5* 9.0   BILIRUBIN mg/dL  --  0.3   ALK PHOS U/L  --  98   ALT (SGPT) U/L  --  16   AST (SGOT) U/L  --  16   GLUCOSE mg/dL 126* 121*       IMAGING:  CT abdomen pelvis and ultrasound reviewed demonstrating distended gallbladder with pericholecystic fluid.  Wall thickening present on the ultrasound.          Rl Ferro MD  General and Endoscopic Surgery  [facility]    [address], [address]  [place] [address]  P: [phone]  F: [phone]

## 2022-09-04 NOTE — ED PROVIDER NOTES
MD ATTESTATION NOTE    The TRAVON and I have discussed this patient's history, physical exam, and treatment plan.  I have reviewed the documentation and personally had a face to face interaction with the patient. I affirm the documentation and agree with the treatment and plan.  The attached note describes my personal findings.      I provided a substantive portion of the care of the patient.  I personally performed the physical exam in its entirety, and below are my findings.  For this patient encounter, the patient wore surgical mask, I wore full protective PPE including N95 and eye protection.      Brief HPI: This patient is a 77-year-old female presenting to the emergency room today with upper abdominal discomfort with associated nausea.  She denies any previous history of similar pain.  She denies any associated vomiting, back pain, or fevers and chills.    PHYSICAL EXAM  ED Triage Vitals   Temp Heart Rate Resp BP SpO2   09/04/22 0010 09/04/22 0010 09/04/22 0010 09/04/22 0101 09/04/22 0010   97.3 °F (36.3 °C) 66 20 152/91 98 %      Temp src Heart Rate Source Patient Position BP Location FiO2 (%)   09/04/22 0010 09/04/22 0010 09/04/22 0101 09/04/22 0101 --   Tympanic Monitor Lying Left arm          GENERAL: In mild distress, nontoxic in appearance  HENT: nares patent  EYES: no scleral icterus  CV: regular rhythm, normal rate, no M/R/G  RESPIRATORY: normal effort, lungs clear bilaterally  ABDOMEN: soft, mild tenderness to palpation present to the right upper quadrant, epigastrium, as well as left upper quadrant.  No rebound or guarding noted, no palpable masses.  MUSCULOSKELETAL: no deformity, no edema  NEURO: alert, moves all extremities, follows commands  PSYCH:  calm, cooperative  SKIN: warm, dry    Vital signs and nursing notes reviewed.        Plan: We will treat the patient's nausea and discomfort as we obtain labs, urinalysis, as well as a CT scan of the abdomen and pelvis.  Will monitor and reassess  following.       Darren Wright MD  09/04/22 0313

## 2022-09-05 LAB
ANION GAP SERPL CALCULATED.3IONS-SCNC: 12.2 MMOL/L (ref 5–15)
BACTERIA SPEC AEROBE CULT: NORMAL
BASOPHILS # BLD AUTO: 0.04 10*3/MM3 (ref 0–0.2)
BASOPHILS NFR BLD AUTO: 0.4 % (ref 0–1.5)
BUN SERPL-MCNC: 11 MG/DL (ref 8–23)
BUN/CREAT SERPL: 14.1 (ref 7–25)
CALCIUM SPEC-SCNC: 8.5 MG/DL (ref 8.6–10.5)
CHLORIDE SERPL-SCNC: 99 MMOL/L (ref 98–107)
CO2 SERPL-SCNC: 27.8 MMOL/L (ref 22–29)
CREAT SERPL-MCNC: 0.78 MG/DL (ref 0.57–1)
DEPRECATED RDW RBC AUTO: 43.2 FL (ref 37–54)
EGFRCR SERPLBLD CKD-EPI 2021: 77.4 ML/MIN/1.73
EOSINOPHIL # BLD AUTO: 0.08 10*3/MM3 (ref 0–0.4)
EOSINOPHIL NFR BLD AUTO: 0.8 % (ref 0.3–6.2)
ERYTHROCYTE [DISTWIDTH] IN BLOOD BY AUTOMATED COUNT: 14.2 % (ref 12.3–15.4)
GLUCOSE SERPL-MCNC: 116 MG/DL (ref 65–99)
HCT VFR BLD AUTO: 35.4 % (ref 34–46.6)
HGB BLD-MCNC: 11.9 G/DL (ref 12–15.9)
IMM GRANULOCYTES # BLD AUTO: 0.04 10*3/MM3 (ref 0–0.05)
IMM GRANULOCYTES NFR BLD AUTO: 0.4 % (ref 0–0.5)
LYMPHOCYTES # BLD AUTO: 1.3 10*3/MM3 (ref 0.7–3.1)
LYMPHOCYTES NFR BLD AUTO: 13.8 % (ref 19.6–45.3)
MCH RBC QN AUTO: 28.3 PG (ref 26.6–33)
MCHC RBC AUTO-ENTMCNC: 33.6 G/DL (ref 31.5–35.7)
MCV RBC AUTO: 84.3 FL (ref 79–97)
MONOCYTES # BLD AUTO: 0.59 10*3/MM3 (ref 0.1–0.9)
MONOCYTES NFR BLD AUTO: 6.3 % (ref 5–12)
NEUTROPHILS NFR BLD AUTO: 7.37 10*3/MM3 (ref 1.7–7)
NEUTROPHILS NFR BLD AUTO: 78.3 % (ref 42.7–76)
NRBC BLD AUTO-RTO: 0 /100 WBC (ref 0–0.2)
PLATELET # BLD AUTO: 259 10*3/MM3 (ref 140–450)
PMV BLD AUTO: 10.1 FL (ref 6–12)
POTASSIUM SERPL-SCNC: 3.8 MMOL/L (ref 3.5–5.2)
RBC # BLD AUTO: 4.2 10*6/MM3 (ref 3.77–5.28)
SODIUM SERPL-SCNC: 139 MMOL/L (ref 136–145)
WBC NRBC COR # BLD: 9.42 10*3/MM3 (ref 3.4–10.8)

## 2022-09-05 PROCEDURE — A9270 NON-COVERED ITEM OR SERVICE: HCPCS | Performed by: SURGERY

## 2022-09-05 PROCEDURE — 63710000001 PANTOPRAZOLE 40 MG TABLET DELAYED-RELEASE: Performed by: SURGERY

## 2022-09-05 PROCEDURE — G0378 HOSPITAL OBSERVATION PER HR: HCPCS

## 2022-09-05 PROCEDURE — 63710000001 CARVEDILOL 3.125 MG TABLET: Performed by: SURGERY

## 2022-09-05 PROCEDURE — 63710000001 GUAIFENESIN 600 MG TABLET SUSTAINED-RELEASE 12 HOUR: Performed by: INTERNAL MEDICINE

## 2022-09-05 PROCEDURE — 85025 COMPLETE CBC W/AUTO DIFF WBC: CPT | Performed by: INTERNAL MEDICINE

## 2022-09-05 PROCEDURE — 80048 BASIC METABOLIC PNL TOTAL CA: CPT | Performed by: INTERNAL MEDICINE

## 2022-09-05 PROCEDURE — A9270 NON-COVERED ITEM OR SERVICE: HCPCS | Performed by: INTERNAL MEDICINE

## 2022-09-05 PROCEDURE — 25010000002 CEFTRIAXONE PER 250 MG: Performed by: INTERNAL MEDICINE

## 2022-09-05 PROCEDURE — 63710000001 HYDROCODONE-ACETAMINOPHEN 5-325 MG TABLET: Performed by: SURGERY

## 2022-09-05 PROCEDURE — 99024 POSTOP FOLLOW-UP VISIT: CPT | Performed by: SURGERY

## 2022-09-05 PROCEDURE — 63710000001 LEVOTHYROXINE 50 MCG TABLET: Performed by: SURGERY

## 2022-09-05 RX ORDER — GUAIFENESIN 600 MG/1
600 TABLET, EXTENDED RELEASE ORAL EVERY 12 HOURS SCHEDULED
Status: DISCONTINUED | OUTPATIENT
Start: 2022-09-05 | End: 2022-09-06 | Stop reason: HOSPADM

## 2022-09-05 RX ADMIN — GUAIFENESIN 600 MG: 600 TABLET, EXTENDED RELEASE ORAL at 21:12

## 2022-09-05 RX ADMIN — METRONIDAZOLE 500 MG: 500 INJECTION, SOLUTION INTRAVENOUS at 01:11

## 2022-09-05 RX ADMIN — CEFTRIAXONE 2 G: 2 INJECTION, POWDER, FOR SOLUTION INTRAMUSCULAR; INTRAVENOUS at 06:16

## 2022-09-05 RX ADMIN — CARVEDILOL 3.12 MG: 3.12 TABLET, FILM COATED ORAL at 21:12

## 2022-09-05 RX ADMIN — GUAIFENESIN 600 MG: 600 TABLET, EXTENDED RELEASE ORAL at 12:02

## 2022-09-05 RX ADMIN — PANTOPRAZOLE SODIUM 40 MG: 40 TABLET, DELAYED RELEASE ORAL at 06:16

## 2022-09-05 RX ADMIN — METRONIDAZOLE 500 MG: 500 INJECTION, SOLUTION INTRAVENOUS at 08:12

## 2022-09-05 RX ADMIN — HYDROCODONE BITARTRATE AND ACETAMINOPHEN 1 TABLET: 5; 325 TABLET ORAL at 16:04

## 2022-09-05 RX ADMIN — Medication 10 ML: at 08:27

## 2022-09-05 RX ADMIN — Medication 10 ML: at 21:12

## 2022-09-05 RX ADMIN — LEVOTHYROXINE SODIUM 50 MCG: 0.05 TABLET ORAL at 08:17

## 2022-09-05 RX ADMIN — HYDROCODONE BITARTRATE AND ACETAMINOPHEN 1 TABLET: 5; 325 TABLET ORAL at 03:32

## 2022-09-05 NOTE — PLAN OF CARE
Goal Outcome Evaluation:  Plan of Care Reviewed With: patient        Progress: improving  Outcome Evaluation: coreg held this am due to low b/p.  minimal discomfort.  using ice pack prn to abdomen.  lap sites CDI with dermabond.  oxygen down from 2LPM to 0.5 LPM but sats drop on room air.  walked in sanchez.  up to chair.  encouraged IS use every 1 hour.  not passing any flatus yet.  tolerating PO intake.

## 2022-09-05 NOTE — PLAN OF CARE
Goal Outcome Evaluation:  Plan of Care Reviewed With: patient        Progress: improving  Outcome Evaluation: vss,c/o pain medicated with norco, tolerating clear liquid diet, encourage to increase flluid intake and to use incentive spirometer, voiding freely, ambulatedin the hallway, 5 lap site with dermabond, continue to monitor the pt.

## 2022-09-05 NOTE — PROGRESS NOTES
Name: Codie Munson ADMIT: 2022   : 1945  PCP: Sri Leach PA-C    MRN: [id number] LOS: 0 days   AGE/SEX: 77 y.o. female  ROOM: [facility]     Subjective   Subjective   CC: RUQ/Epigastric Pain  No acute events. Patient reports feeling much better today. Taking PO. Pain is well-controlled. Dyspnea is improved. She ambulated around the nurses' station. No CP/f/c/n/v/d. No BM.     Objective   Objective   Vital Signs  Temp:  [97.3 °F (36.3 °C)-98.8 °F (37.1 °C)] 98.1 °F (36.7 °C)  Heart Rate:  [59-82] 73  Resp:  [16-18] 18  BP: ()/(52-83) 97/54  SpO2:  [87 %-98 %] 94 %  on  Flow (L/min):  [0.5-4] 0.5;   Device (Oxygen Therapy): nasal cannula  Body mass index is 26.38 kg/m².  Physical Exam  Vitals and nursing note reviewed.   Constitutional:       General: She is not in acute distress.     Appearance: She is not toxic-appearing or diaphoretic.   HENT:      Head: Normocephalic and atraumatic.      Nose: Nose normal.      Mouth/Throat:      Mouth: Mucous membranes are moist.      Pharynx: Oropharynx is clear.   Eyes:      Conjunctiva/sclera: Conjunctivae normal.      Pupils: Pupils are equal, round, and reactive to light.   Cardiovascular:      Rate and Rhythm: Normal rate and regular rhythm.      Pulses: Normal pulses.   Pulmonary:      Effort: Pulmonary effort is normal.      Breath sounds: No rales.   Abdominal:      General: Bowel sounds are normal.      Palpations: Abdomen is soft.      Tenderness: There is abdominal tenderness (appropriate postop). There is no guarding or rebound.      Comments: Laparoscopic surgical wounds c/d/i   Musculoskeletal:         General: No swelling or tenderness.      Cervical back: Normal range of motion and neck supple.   Skin:     General: Skin is warm and dry.      Capillary Refill: Capillary refill takes less than 2 seconds.   Neurological:      General: No focal deficit present.      Mental Status: She is alert and oriented to person, place, and time.    Psychiatric:         Mood and Affect: Mood normal.         Behavior: Behavior normal.       Results Review     I reviewed the patient's new clinical results.  Results from last 7 days   Lab Units 09/05/22  0501 09/04/22  0102   WBC 10*3/mm3 9.42 12.46*   HEMOGLOBIN g/dL 11.9* 12.7   PLATELETS 10*3/mm3 259 303     Results from last 7 days   Lab Units 09/05/22  0501 09/04/22  0549 09/04/22  0102   SODIUM mmol/L 139 144 141   POTASSIUM mmol/L 3.8 4.6 4.2   CHLORIDE mmol/L 99 110* 106   CO2 mmol/L 27.8 25.2 25.2   BUN mg/dL 11 11 14   CREATININE mg/dL 0.78 0.83 0.93   GLUCOSE mg/dL 116* 126* 121*   EGFR mL/min/1.73 77.4 71.8 62.6     Results from last 7 days   Lab Units 09/04/22  0102   ALBUMIN g/dL 4.30   BILIRUBIN mg/dL 0.3   ALK PHOS U/L 98   AST (SGOT) U/L 16   ALT (SGPT) U/L 16     Results from last 7 days   Lab Units 09/05/22  0501 09/04/22  0549 09/04/22  0102   CALCIUM mg/dL 8.5* 8.5* 9.0   ALBUMIN g/dL  --   --  4.30   MAGNESIUM mg/dL  --   --  2.0       No results found for: HGBA1C, POCGLU    CT Abdomen Pelvis With Contrast    Result Date: 9/4/2022   1. The patient's gallbladder is distended. There is trace pericholecystic fluid, and there is potentially a stone lodged within the cystic duct. Appearance is concerning for acute cholecystitis. Gallbladder ultrasound would be confirmatory. 2. Thick-walled appearance to the urinary bladder, with perivesical soft tissue stranding. Correlation with urinalysis and urine cultures is recommended.  Radiation dose reduction techniques were utilized, including automated exposure control and exposure modulation based on body size.  This report was finalized on 9/4/2022 3:35 AM by Dr. Yee Rosenbaum M.D.      US Gallbladder    Result Date: 9/4/2022  Gallbladder is again noted to be distended. There is some gallbladder wall thickening, although no gallstones or pericholecystic fluid is seen on the current study. However, given the findings on CT, as well as history of  upper abdominal pain and nausea, acute cholecystitis remains in the differential. HIDA scan could be considered if clinical doubt persists.  This report was finalized on 9/4/2022 4:40 AM by Dr. Yee Rosenbaum M.D.      XR Chest 1 View    Result Date: 9/4/2022  Interstitial and vascular prominence consistent with mild-to-moderate congestive changes. Additionally, there is patchy infiltrate at the bases bilaterally more prominent on the right. The above information was called to the patient's nurse at the time of dictation. The patient's nurse is to relay the information to the clinical service.  This report was finalized on 9/4/2022 7:10 PM by Dr. Tommie Simon M.D.      Scheduled Medications  carvedilol, 3.125 mg, Oral, BID  cefTRIAXone, 2 g, Intravenous, Q24H  levothyroxine, 50 mcg, Oral, Daily  metroNIDAZOLE, 500 mg, Intravenous, Q8H  pantoprazole, 40 mg, Oral, QAM  sodium chloride, 10 mL, Intravenous, Q12H    Infusions   Diet  Diet Regular; Cardiac       Assessment/Plan     Active Hospital Problems    Diagnosis  POA   • **Acute cholecystitis [K81.0]  Yes   • Cardiomyopathy (HCC) [I42.9]  Yes   • Essential hypertension [I10]  Yes   • Acute on chronic combined systolic and diastolic CHF (congestive heart failure) (HCC) [I50.43]  Yes   • Hypothyroidism due to acquired atrophy of thyroid [E03.4]  Yes   • Gastro-esophageal reflux disease without esophagitis [K21.9]  Yes   • HLD (hyperlipidemia) [E78.5]  Yes      Resolved Hospital Problems   No resolved problems to display.   Acute Cholecystitis  - s/p laparoscopic cholecystectomy 9/4/22; cholangiogram was attempted but was aborted after contrast spillage into the RUQ was noted, cystic duct was clipped  - continue on ceftriaxone and flagyl  - diet advanced  - continue pain control, incentive spirometry, encourage mobility  - appreciate general surgery recs    Acute on Chronic Combined Systolic and Diastolic CHF  - postoperative, from aggressive volume  resuscitation  - doing much better after diuresis, hold further today  - she is non minimal supplemental oxygen, I think we can wean this off today    HTN  - having some low BPs postop which is not surprising; this is asymptomatic  - will place hold parameters on coreg    Hypothyroidism  - continue on levothyroxine    SCDs for DVT prophylaxis.  Full code.  Discussed with patient, family and nursing staff.  Anticipate discharge home tomorrow.      Darell Tinajero MD  [facility]  09/05/22  10:50 EDT

## 2022-09-05 NOTE — PROGRESS NOTES
Postoperative day 1 from laparoscopic cholecystectomy.  Overall she is doing well but still requiring some oxygen.  Recommended continue ambulation, incentive spirometry.  Antibiotics discontinued.  Okay for discharge from my standpoint later today or tomorrow pending progress today.

## 2022-09-06 ENCOUNTER — READMISSION MANAGEMENT (OUTPATIENT)
Dept: CALL CENTER | Facility: HOSPITAL | Age: 77
End: 2022-09-06

## 2022-09-06 VITALS
HEIGHT: 64 IN | TEMPERATURE: 97.3 F | HEART RATE: 75 BPM | DIASTOLIC BLOOD PRESSURE: 65 MMHG | RESPIRATION RATE: 18 BRPM | SYSTOLIC BLOOD PRESSURE: 120 MMHG | OXYGEN SATURATION: 95 % | BODY MASS INDEX: 26.23 KG/M2 | WEIGHT: 153.66 LBS

## 2022-09-06 LAB
ANION GAP SERPL CALCULATED.3IONS-SCNC: 10.9 MMOL/L (ref 5–15)
BASOPHILS # BLD AUTO: 0.06 10*3/MM3 (ref 0–0.2)
BASOPHILS NFR BLD AUTO: 0.7 % (ref 0–1.5)
BUN SERPL-MCNC: 10 MG/DL (ref 8–23)
BUN/CREAT SERPL: 10.9 (ref 7–25)
CALCIUM SPEC-SCNC: 9 MG/DL (ref 8.6–10.5)
CHLORIDE SERPL-SCNC: 100 MMOL/L (ref 98–107)
CO2 SERPL-SCNC: 29.1 MMOL/L (ref 22–29)
CREAT SERPL-MCNC: 0.92 MG/DL (ref 0.57–1)
DEPRECATED RDW RBC AUTO: 41.5 FL (ref 37–54)
EGFRCR SERPLBLD CKD-EPI 2021: 63.5 ML/MIN/1.73
EOSINOPHIL # BLD AUTO: 0.43 10*3/MM3 (ref 0–0.4)
EOSINOPHIL NFR BLD AUTO: 4.9 % (ref 0.3–6.2)
ERYTHROCYTE [DISTWIDTH] IN BLOOD BY AUTOMATED COUNT: 13.7 % (ref 12.3–15.4)
GLUCOSE SERPL-MCNC: 120 MG/DL (ref 65–99)
HCT VFR BLD AUTO: 37.8 % (ref 34–46.6)
HGB BLD-MCNC: 12.6 G/DL (ref 12–15.9)
IMM GRANULOCYTES # BLD AUTO: 0.03 10*3/MM3 (ref 0–0.05)
IMM GRANULOCYTES NFR BLD AUTO: 0.3 % (ref 0–0.5)
LYMPHOCYTES # BLD AUTO: 1.4 10*3/MM3 (ref 0.7–3.1)
LYMPHOCYTES NFR BLD AUTO: 15.9 % (ref 19.6–45.3)
MCH RBC QN AUTO: 28.1 PG (ref 26.6–33)
MCHC RBC AUTO-ENTMCNC: 33.3 G/DL (ref 31.5–35.7)
MCV RBC AUTO: 84.2 FL (ref 79–97)
MONOCYTES # BLD AUTO: 0.51 10*3/MM3 (ref 0.1–0.9)
MONOCYTES NFR BLD AUTO: 5.8 % (ref 5–12)
NEUTROPHILS NFR BLD AUTO: 6.39 10*3/MM3 (ref 1.7–7)
NEUTROPHILS NFR BLD AUTO: 72.4 % (ref 42.7–76)
NRBC BLD AUTO-RTO: 0 /100 WBC (ref 0–0.2)
PLATELET # BLD AUTO: 300 10*3/MM3 (ref 140–450)
PMV BLD AUTO: 10.3 FL (ref 6–12)
POTASSIUM SERPL-SCNC: 4.1 MMOL/L (ref 3.5–5.2)
RBC # BLD AUTO: 4.49 10*6/MM3 (ref 3.77–5.28)
SODIUM SERPL-SCNC: 140 MMOL/L (ref 136–145)
WBC NRBC COR # BLD: 8.82 10*3/MM3 (ref 3.4–10.8)

## 2022-09-06 PROCEDURE — 63710000001 GUAIFENESIN 600 MG TABLET SUSTAINED-RELEASE 12 HOUR: Performed by: INTERNAL MEDICINE

## 2022-09-06 PROCEDURE — 99024 POSTOP FOLLOW-UP VISIT: CPT | Performed by: SURGERY

## 2022-09-06 PROCEDURE — 80048 BASIC METABOLIC PNL TOTAL CA: CPT | Performed by: INTERNAL MEDICINE

## 2022-09-06 PROCEDURE — 63710000001 PANTOPRAZOLE 40 MG TABLET DELAYED-RELEASE: Performed by: SURGERY

## 2022-09-06 PROCEDURE — 63710000001 HYDROCODONE-ACETAMINOPHEN 5-325 MG TABLET: Performed by: SURGERY

## 2022-09-06 PROCEDURE — 85025 COMPLETE CBC W/AUTO DIFF WBC: CPT | Performed by: INTERNAL MEDICINE

## 2022-09-06 PROCEDURE — A9270 NON-COVERED ITEM OR SERVICE: HCPCS | Performed by: INTERNAL MEDICINE

## 2022-09-06 PROCEDURE — A9270 NON-COVERED ITEM OR SERVICE: HCPCS | Performed by: SURGERY

## 2022-09-06 PROCEDURE — G0378 HOSPITAL OBSERVATION PER HR: HCPCS

## 2022-09-06 PROCEDURE — 63710000001 LEVOTHYROXINE 50 MCG TABLET: Performed by: SURGERY

## 2022-09-06 PROCEDURE — 63710000001 CARVEDILOL 3.125 MG TABLET: Performed by: SURGERY

## 2022-09-06 RX ORDER — HYDROCODONE BITARTRATE AND ACETAMINOPHEN 5; 325 MG/1; MG/1
1 TABLET ORAL EVERY 6 HOURS PRN
Qty: 12 TABLET | Refills: 0 | Status: SHIPPED | OUTPATIENT
Start: 2022-09-06 | End: 2022-09-09

## 2022-09-06 RX ORDER — FUROSEMIDE 20 MG/1
20 TABLET ORAL DAILY
Qty: 30 TABLET | Refills: 0 | Status: SHIPPED | OUTPATIENT
Start: 2022-09-06 | End: 2022-09-09

## 2022-09-06 RX ADMIN — PANTOPRAZOLE SODIUM 40 MG: 40 TABLET, DELAYED RELEASE ORAL at 06:00

## 2022-09-06 RX ADMIN — CARVEDILOL 3.12 MG: 3.12 TABLET, FILM COATED ORAL at 09:03

## 2022-09-06 RX ADMIN — GUAIFENESIN 600 MG: 600 TABLET, EXTENDED RELEASE ORAL at 09:03

## 2022-09-06 RX ADMIN — Medication 10 ML: at 09:03

## 2022-09-06 RX ADMIN — LEVOTHYROXINE SODIUM 50 MCG: 0.05 TABLET ORAL at 05:19

## 2022-09-06 RX ADMIN — HYDROCODONE BITARTRATE AND ACETAMINOPHEN 1 TABLET: 5; 325 TABLET ORAL at 05:04

## 2022-09-06 NOTE — PLAN OF CARE
Goal Outcome Evaluation:  Plan of Care Reviewed With: patient        Progress: improving  Outcome Evaluation: Lap sites w/ dermabond cdi, 0.5L O2, ambulated in sanchez, encouraged IS use, SL, denies need for pain or nausea medications, scds on, voiding freely,  at bedside

## 2022-09-06 NOTE — PROGRESS NOTES
Case Management Discharge Note      Final Note: Discharged home. Irene Felder, WILLIE         Transportation Services  Private: Car    Final Discharge Disposition Code: 01 - home or self-care

## 2022-09-06 NOTE — DISCHARGE SUMMARY
Date of Admission: 9/4/2022  Date of Discharge:  9/6/2022  Primary Care Physician: Sri Leach PA-C     Discharge Diagnosis:  Active Hospital Problems    Diagnosis  POA   • **Acute cholecystitis [K81.0]  Yes   • Cardiomyopathy (HCC) [I42.9]  Yes   • Essential hypertension [I10]  Yes   • Acute on chronic combined systolic and diastolic CHF (congestive heart failure) (HCC) [I50.43]  Yes   • Hypothyroidism due to acquired atrophy of thyroid [E03.4]  Yes   • Gastro-esophageal reflux disease without esophagitis [K21.9]  Yes   • HLD (hyperlipidemia) [E78.5]  Yes      Resolved Hospital Problems   No resolved problems to display.       Presenting Problem/History of Present Illness:  Acute cholecystitis [K81.0]  History of cardiomyopathy [Z86.79]  Hyperlipidemia, unspecified hyperlipidemia type [E78.5]  Hypothyroidism, unspecified type [E03.9]     Hospital Course:  The patient is a 77 y.o. female with a history of HTN, HLD, Hypothyroidism, GERD, and cardiomyopathy with chronic combined systolic and diastolic CHF who presented with severe acute onset right upper quadrant and epigastric pain following dinner. Please see admission H&P for further details. She was found to have acute cholecystitis. She was started on antibiotics and Dr. Ferro performed a laparoscopic cholecystectomy on 9/4/22. She tolerated this well. She had some contrast leaking from the cystic duct during her intraoperative cholangiogram and this was therefore aborted and extra clips were placed on the cystic duct. Following surgery she developed signs of acute on chronic CHF probably due to aggressive volume resuscitation. She responded very well to diuretics. She was hypoxemic but was weaned to room air prior to discharge. She is now tolerating a regular diet with good pain control and normal bowel function. She has finished a short course of antibiotics. She is medically stable and will be discharged home. She will be started on a low dose of  "lasix. She should follow up with Dr. Ferro as scheduled and keep close follow up with her PCP.      Exam Today:  Blood pressure 119/74, pulse 86, temperature 98.3 °F (36.8 °C), temperature source Oral, resp. rate 18, height 162.6 cm (64\"), weight 69.7 kg (153 lb 10.6 oz), SpO2 90 %, not currently breastfeeding.  Vitals and nursing note reviewed.   Constitutional:       General: She is not in acute distress.     Appearance: She is not toxic-appearing or diaphoretic.   HENT:      Head: Normocephalic and atraumatic.      Nose: Nose normal.      Mouth/Throat:      Mouth: Mucous membranes are moist.      Pharynx: Oropharynx is clear.   Eyes:      Conjunctiva/sclera: Conjunctivae normal.      Pupils: Pupils are equal, round, and reactive to light.   Cardiovascular:      Rate and Rhythm: Normal rate and regular rhythm.      Pulses: Normal pulses.   Pulmonary:      Effort: Pulmonary effort is normal.      Breath sounds: No rales.   Abdominal:      General: Bowel sounds are normal.      Palpations: Abdomen is soft.      Tenderness: There is abdominal tenderness (appropriate postop). There is no guarding or rebound.      Comments: Laparoscopic surgical wounds c/d/i   Musculoskeletal:         General: Trace BLE edema. No tenderness.      Cervical back: Normal range of motion and neck supple.   Skin:     General: Skin is warm and dry.      Capillary Refill: Capillary refill takes less than 2 seconds.   Neurological:      General: No focal deficit present.      Mental Status: She is alert and oriented to person, place, and time.   Psychiatric:         Mood and Affect: Mood normal.         Behavior: Behavior normal.     Procedures Performed:  Procedure(s):  CHOLECYSTECTOMY LAPAROSCOPIC INTRAOPERATIVE CHOLANGIOGRAM      Consults:   Consults     Date and Time Order Name Status Description    9/4/2022  5:29 AM [facility] (on-call MD unless specified) Details      9/4/2022  5:06 AM Surgery (on-call MD unless specified) Completed          "   Discharge Disposition:  Home or Self Care    Discharge Medications:     Discharge Medications      New Medications      Instructions Start Date   furosemide 20 MG tablet  Commonly known as: Lasix   20 mg, Oral, Daily      HYDROcodone-acetaminophen 5-325 MG per tablet  Commonly known as: NORCO   1 tablet, Oral, Every 6 Hours PRN         Changes to Medications      Instructions Start Date   rosuvastatin 20 MG tablet  Commonly known as: CRESTOR  What changed:   · how much to take  · Another medication with the same name was removed. Continue taking this medication, and follow the directions you see here.   TAKE ONE TABLET BY MOUTH ONCE NIGHTLY         Continue These Medications      Instructions Start Date   carvedilol 3.125 MG tablet  Commonly known as: COREG   3.125 mg, Oral, 2 Times Daily      conjugated estrogens 0.625 MG/GM vaginal cream  Commonly known as: PREMARIN   0.625 g, Vaginal, Nightly, Monday Wednesday friday      esomeprazole 40 MG capsule  Commonly known as: nexIUM   40 mg, Oral, Every Morning Before Breakfast      fluticasone 50 MCG/ACT nasal spray  Commonly known as: FLONASE   2 sprays, Nasal, As Needed      levothyroxine 50 MCG tablet  Commonly known as: Synthroid   50 mcg, Oral, Daily      Vitamin D3 50 MCG (2000 UT) tablet   Oral             Discharge Diet:   Diet Instructions     Diet: Regular, Cardiac, Specialty Diet; Thin Liquids, No Restrictions; Low Fat      Discharge Diet:  Regular  Cardiac  Specialty Diet       Fluid Consistency: Thin Liquids, No Restrictions    Specialty Diets: Low Fat          Activity at Discharge:   Activity Instructions     Other Activity Instructions      Activity Instructions: per general surgery instructions          Follow-up Appointments:  Future Appointments   Date Time Provider Department Center   11/10/2022  2:45 PM Sri Leach PA-C [facility]  [facility]   5/12/2023  2:20 PM Anamika Lane MD [facility]     Additional Instructions for the  Follow-ups that You Need to Schedule     Discharge Follow-up with PCP   As directed       Currently Documented PCP:    Sri Leach PA-C    PCP Phone Number:    [phone]     Follow Up Details: 1 week         Discharge Follow-up with Specified Provider: Dr. Ferro (General Surgery)   As directed      To: Dr. Ferro (General Surgery)    Follow Up Details: 2 weeks or as scheduled               Test Results Pending at Discharge:  Pending Labs     Order Current Status    Tissue Pathology Exam In process           Darell Tinajero MD  09/06/22  09:56 EDT    Time Spent on Discharge Activities: Greater than 30 minutes.

## 2022-09-06 NOTE — NURSING NOTE
Patient and  received discharge instructions. Patient is aware to call and schedule an appointment with Dr. Ferro for a follow up appointment as well as her primary care provider. Patient is aware to  new medications at [facility] in [place]. Patient was given information on new medications and CHF monitoring for home. All questions answered, patient is ready to go home.

## 2022-09-06 NOTE — PROGRESS NOTES
Continued Stay Note  [facility]     Patient Name: Codie Munson  MRN: [id number]  Today's Date: 9/6/2022    Admit Date: 9/4/2022     Discharge Plan     Row Name 09/06/22 1007       Plan    Plan Home no needs    Plan Comments Discharge order noted. Met with patient who confirmed DC plan is home. Stated her spouse will assist as needed and will provide transportation at DC. Denies any needs/equipment.               Discharge Codes    No documentation.               Expected Discharge Date and Time     Expected Discharge Date Expected Discharge Time    Sep 6, 2022             Irene Felder RN

## 2022-09-06 NOTE — OUTREACH NOTE
Prep Survey    Flowsheet Row Responses   [facility] patient discharged from? [place]   Is LACE score < 7 ? Yes   Emergency Room discharge w/ pulse ox? No   Eligibility [facility]   Date of Admission 09/04/22   Date of Discharge 09/06/22   Discharge Disposition Home or Self Care   Discharge diagnosis CHOLECYSTECTOMY LAPAROSCOPIC INTRAOPERATIVE CHOLANGIOGRAM   Does the patient have one of the following disease processes/diagnoses(primary or secondary)? General Surgery   Does the patient have Home health ordered? No   Is there a DME ordered? No   Prep survey completed? Yes          VIVI BRADLEY - Registered Nurse

## 2022-09-07 ENCOUNTER — TRANSITIONAL CARE MANAGEMENT TELEPHONE ENCOUNTER (OUTPATIENT)
Dept: CALL CENTER | Facility: HOSPITAL | Age: 77
End: 2022-09-07

## 2022-09-07 ENCOUNTER — TELEPHONE (OUTPATIENT)
Dept: CARDIOLOGY | Facility: CLINIC | Age: 77
End: 2022-09-07

## 2022-09-07 LAB
LAB AP CASE REPORT: NORMAL
PATH REPORT.FINAL DX SPEC: NORMAL
PATH REPORT.GROSS SPEC: NORMAL

## 2022-09-07 NOTE — OUTREACH NOTE
Call Center TCM Note    Flowsheet Row Responses   [facility] patient discharged from? [place]   Does the patient have one of the following disease processes/diagnoses(primary or secondary)? General Surgery   TCM attempt successful? No   Unsuccessful attempts Attempt 2          Josephine Dc RN    9/7/2022, 16:51 EDT

## 2022-09-07 NOTE — TELEPHONE ENCOUNTER
"Patient was discharged from the hospital on 9/6 following cholecystectomy with Dr. Ferro. She was prescribed Lasix 20 mg daily for 30 days, but she isn't sure why she was prescribed this and wanted to check with Dr. Lane or Jeanette prior to starting it. She also takes carvedilol 3.125 mg daily and is concerned that adding Lasix will drop her BP. She denies any swelling or shortness of breath. Her BP this am was 107/72 HR 79.     She also states she was told her gallbladder was \"laying in fluid\" and is asking if she needs to be concerned about having fluid around her heart. Please let me know if you have any recommendations.     Thank you,   Renuka Shirley RN  [facility] Triage  09/07/22  11:49 EDT      "

## 2022-09-07 NOTE — TELEPHONE ENCOUNTER
There was a trace amount of fluid around the gallbladder; surgery should have fixed this. Because she was not eating, they gave her IV fluids and probably over did it. This is not unusual after surgery. They started her on lasix to help her breathing with excess fluid.     If her breathing is comfortable and she has no leg swelling, she can stop the lasix. We can see her in the office (I think she prefers [place]) in the next couple of weeks to make sure she is ok.     Thanks!  MARISEL Perez

## 2022-09-07 NOTE — TELEPHONE ENCOUNTER
Jeanette,     Thank you for explaining. I called the patient and gave her your message; she verbalized understanding. She denies swelling in her legs or SOA and therefore will not take her Lasix. I have her scheduled with you on Friday in [place] to follow up.     Thanks,   Renuka Shirley RN  [facility] Triage  09/07/22  12:54 EDT

## 2022-09-07 NOTE — OUTREACH NOTE
Call Center TCM Note    Flowsheet Row Responses   [facility] patient discharged from? [place]   Does the patient have one of the following disease processes/diagnoses(primary or secondary)? General Surgery   TCM attempt successful? No  [no verbal release]   Unsuccessful attempts Attempt 1          Josephine Dc RN    9/7/2022, 16:05 EDT

## 2022-09-08 ENCOUNTER — TRANSITIONAL CARE MANAGEMENT TELEPHONE ENCOUNTER (OUTPATIENT)
Dept: CALL CENTER | Facility: HOSPITAL | Age: 77
End: 2022-09-08

## 2022-09-08 NOTE — OUTREACH NOTE
Call Center TCM Note    Flowsheet Row Responses   [facility] patient discharged from? [place]   Does the patient have one of the following disease processes/diagnoses(primary or secondary)? General Surgery   TCM attempt successful? No   Unsuccessful attempts Attempt 3          Malena Gonzalez RN    9/8/2022, 09:25 EDT

## 2022-09-09 ENCOUNTER — OFFICE VISIT (OUTPATIENT)
Dept: CARDIOLOGY | Facility: CLINIC | Age: 77
End: 2022-09-09

## 2022-09-09 VITALS
SYSTOLIC BLOOD PRESSURE: 132 MMHG | DIASTOLIC BLOOD PRESSURE: 70 MMHG | WEIGHT: 150 LBS | BODY MASS INDEX: 25.61 KG/M2 | HEART RATE: 69 BPM | HEIGHT: 64 IN

## 2022-09-09 DIAGNOSIS — E78.2 MIXED HYPERLIPIDEMIA: ICD-10-CM

## 2022-09-09 DIAGNOSIS — I83.813 VARICOSE VEINS OF BOTH LOWER EXTREMITIES WITH PAIN: ICD-10-CM

## 2022-09-09 DIAGNOSIS — I42.9 CARDIOMYOPATHY, UNSPECIFIED TYPE: ICD-10-CM

## 2022-09-09 DIAGNOSIS — E87.79 OTHER HYPERVOLEMIA: Primary | ICD-10-CM

## 2022-09-09 DIAGNOSIS — I49.3 PVC (PREMATURE VENTRICULAR CONTRACTION): ICD-10-CM

## 2022-09-09 PROCEDURE — 93000 ELECTROCARDIOGRAM COMPLETE: CPT | Performed by: NURSE PRACTITIONER

## 2022-09-09 PROCEDURE — 99214 OFFICE O/P EST MOD 30 MIN: CPT | Performed by: NURSE PRACTITIONER

## 2022-09-09 RX ORDER — ROSUVASTATIN CALCIUM 20 MG/1
30 TABLET, COATED ORAL NIGHTLY
Qty: 30 TABLET | Refills: 11
Start: 2022-09-09 | End: 2022-10-26 | Stop reason: SDUPTHER

## 2022-09-09 RX ORDER — ROSUVASTATIN CALCIUM 20 MG/1
10 TABLET, COATED ORAL NIGHTLY
Qty: 30 TABLET | Refills: 11
Start: 2022-09-09 | End: 2022-09-09

## 2022-09-09 NOTE — PROGRESS NOTES
[facility]  [address]  [address]  [place] [address]  Phone: [phone]  Fax: [phone]      Patient Name: Codie Munson  :1945  Age: 77 y.o.  Primary Cardiologist: Anamika Lane MD  Encounter Provider:  MARISEL Rebollar      Chief Complaint     Chief Complaint: Fatigue    SUBJECTIVE     History of Present Illness:  Codie Munson is a 77 y.o.  female whose medical history includes lower extremity venous insufficiency, hyperlipidemia, and urethral prolapse (bladder suspension and pessary). She is followed in our office by Dr. Lane for non-ischemic cardiomyopathy.     22 Follow-up:  She is here for hospital follow-up.  At last visit I had started her on metoprolol succinate but she was unable to tolerate this due to pins-and-needles feeling; I switched her to 3.125 mg carvedilol twice daily which she was able to tolerate.  She was admitted to the hospital in early 2022 with acute cholecystitis treated with antibiotics and laparoscopic cholecystectomy on 2022.  Due to poor appetite she had received some IV fluids and then had trouble weaning off oxygen due to likely volume overload.  She was treated with IV Lasix and sent home with a 7-day course of diuretics.    She called our office about the diuretics: She was having no leg swelling and her breathing was comfortable, so we told her to hold off on starting them.  She feels fine and is recovering from her surgery.  She still has a little bit of discomfort when laying in bed at night.  Her breathing is comfortable, she has no orthopnea, and she has no leg swelling.  Her blood pressure and heart rate are controlled on the 3.125 mg of carvedilol twice daily.  She denies chest pain or lightheadedness.      Below is a summary of pertinent cardiology findings:  • 2005 Cardiac catheterization () for dizziness, palpitations, and SOA: no significant CAD and  normal LVEF. Echocardiogram showed EF 40%. Bilateral carotid artery studies showed minimal disease of left carotid artery; normal bilateral carotid duplex in 2010.  • Reflux of lesser and greater saphenous veins and sees Dr. Sayda Perrin; vein stripping on left leg.   • 2016 Echocardiogram showed EF 60%, grade I diastolic dysfunction, normal wall motion, and no significant valve disease.  • 2017 nonischemic stress nuclear study and vascular screening.   • COVID-19 infection 2022.  • 2022 mild atheromatous plaque in carotid vessels but no hemodynamically significant stenosis.  • 2022 Echocardiogram showed EF 46-50%, hypokinetic apical anterior, apical septal, and mid anteroseptal wall segments, grade I diastolic dysfunction, sigmoid shaped ventricular septum.   • 2022 Cardia CTA showed normal coronary artery anatomy without evidence of significant atherosclerotic disease and calcium score of 5.   • 2022 48-hour holter monitor showed NSR with average HR 86, 2 episodes of SVT lasting 4 beats, and occasional PVCs occurring 1.75% of the study.     Past Medical History:   Diagnosis Date   • Cardiomyopathy (HCC)    • Chronic cystitis    • Cough    • Depression    • GERD (gastroesophageal reflux disease)    • Hyperlipidemia    • Hypertension    • Scarlet fever    • Spinal headache    • Strain of thoracic region    • Urethral prolapse    • URI (upper respiratory infection)    • Urinary retention          Past Surgical History:   Procedure Laterality Date   • APPENDECTOMY     • BLADDER SURGERY     • CARDIAC CATHETERIZATION      Normal. Performed at [facility].   •  SECTION     • COLONOSCOPY     • HYSTERECTOMY     • VEIN LIGATION AND STRIPPING           Social History     Socioeconomic History   • Marital status:    Tobacco Use   • Smoking status: Never Smoker   • Smokeless tobacco: Never Used   • Tobacco comment: drink caffiene daily   Vaping Use  "  • Vaping Use: Never used   Substance and Sexual Activity   • Alcohol use: No   • Drug use: Defer   • Sexual activity: Defer         Review of Systems     Review of Systems   Constitutional: Negative for malaise/fatigue.   Cardiovascular: Negative.          Medications     Allergies as of 09/09/2022 - Reviewed 09/09/2022   Allergen Reaction Noted   • Augmentin [amoxicillin-pot clavulanate] Rash 02/19/2016   • Biaxin [clarithromycin] Rash 02/19/2016         Current Outpatient Medications:   •  carvedilol (COREG) 3.125 MG tablet, Take 1 tablet by mouth 2 (Two) Times a Day., Disp: 60 tablet, Rfl: 11  •  conjugated estrogens (PREMARIN) 0.625 MG/GM vaginal cream, Insert 0.625 g into the vagina Every Night. Monday Wednesday friday, Disp: , Rfl:   •  esomeprazole (NexIUM) 40 MG capsule, Take 40 mg by mouth every morning before breakfast., Disp: , Rfl:   •  fluticasone (FLONASE) 50 MCG/ACT nasal spray, 2 sprays into the nostril(s) as directed by provider As Needed., Disp: , Rfl:   •  levothyroxine (Synthroid) 50 MCG tablet, Take 1 tablet by mouth Daily., Disp: 30 tablet, Rfl: 6  •  rosuvastatin (CRESTOR) 20 MG tablet, Take 1.5 tablets by mouth Every Night., Disp: 30 tablet, Rfl: 11        OBJECTIVE     Vital Signs:   /70   Pulse 69   Ht 162.6 cm (64\")   Wt 68 kg (150 lb)   BMI 25.75 kg/m²       Weight:  Wt Readings from Last 3 Encounters:   09/09/22 68 kg (150 lb)   09/04/22 69.7 kg (153 lb 10.6 oz)   07/15/22 64 kg (141 lb)     Body mass index is 25.75 kg/m².        Physical Exam     Physical Exam  Constitutional:       General: She is not in acute distress.  HENT:      Head: Normocephalic and atraumatic.      Mouth/Throat:      Mouth: Mucous membranes are moist.   Eyes:      General: No scleral icterus.     Extraocular Movements: Extraocular movements intact.      Conjunctiva/sclera: Conjunctivae normal.      Pupils: Pupils are equal, round, and reactive to light.   Cardiovascular:      Rate and Rhythm: Regular " rhythm. Bradycardia present.      Pulses: Normal pulses.      Heart sounds: S1 normal and S2 normal.   Pulmonary:      Effort: No respiratory distress.      Breath sounds: Normal breath sounds. No wheezing, rhonchi or rales.   Abdominal:      General: Bowel sounds are normal. There is no distension.      Palpations: Abdomen is soft.      Tenderness: There is no abdominal tenderness.   Musculoskeletal:         General: Normal range of motion.      Cervical back: Normal range of motion and neck supple.      Right lower leg: No edema.      Left lower leg: No edema.   Skin:     General: Skin is warm and dry.      Coloration: Skin is not jaundiced.   Neurological:      Mental Status: She is alert and oriented to person, place, and time.   Psychiatric:         Mood and Affect: Mood normal.         Reviewed Data     Result Review :  The following data was reviewed by MARISEL Rebollar on 09/09/22:  • Labs 04.28.2022: Cr 0.9, K 4.0, otherwise unremarkable CMP, TSH 4.550, free T4 7.68, Tchol 184, HDL 47, LDL 95, trig 247, hgb 13.3, plt 364.  • Labs 09/06/2022:  cr 0.9, K 4.1, otherwise unremarkable BMP, Hgb 12.6,           ECG 12 Lead    Date/Time: 9/9/2022 9:52 AM  Performed by: Shanna Fernandez APRN  Authorized by: Shanna Fernandez APRN   Comparison: compared with previous ECG from 9/4/2022  Similar to previous ECG  Rhythm: sinus rhythm  Ectopy: atrial premature contractions  Rate: normal  BPM: 69  Conduction: conduction normal  ST Depression: II, III and aVF  T inversion: II, aVF, V2, V3, V4, V5 and V6  T flattening: I and III  Other findings: T wave abnormality    Clinical impression: abnormal EKG            Assessment and Plan        Assessment and Plan     Assessment:  1. Other hypervolemia    2. Cardiomyopathy, unspecified type (HCC)    3. PVC (premature ventricular contraction)    4. Mixed hyperlipidemia    5. Varicose veins of both lower extremities with pain          1. Volume overload: Likely due to IV fluids given during perioperative period.  She is compensated by exam today and has not needed to take any oral furosemide.  2. Nonischemic cardiomyopathy:  first diagnosed in 2000, with EF 40% but normal coronary arteries.  Echocardiogram in August 2016 showed EF 60%.  Echocardiogram May 18, 2020 showed EF 46- 50% and hypokinetic LV wall segments.  Cardiac CTA showed no significant coronary artery disease with calcium score of 5.  She is on 10 mg rosuvastatin daily and Toprol daily. She is compensated by exam today and did not need any oral Lasix.  She did require 1 dose of IV Lasix for volume overload.  3. PVCs: Noted on 48-hour Holter monitor from June 2022.  PVCs occurred 1.75% of the study time.  She was started on Toprol daily but now is experiencing fatigue and lower blood pressures; this is better controlled on carvedilol.  4. Hyperlipidemia: Lipids were controlled when checked in April 2022 except for elevated triglyceride; her rosuvastatin was increased to 30 mg daily.  He will follow-up with us.  5. Venous insufficiency: She follows with Dr. Perrin.  6. Urethral prolapse: She has had bladder suspension and uses a pessary.  7. GERD: She is treated with Nexium daily.  No issues following cholecystectomy    Plan:  1. I made no medication changes today.  2. We discussed she could take a dose of Lasix on days she has leg swelling, dyspnea, or weight gain.  I did ask her to notify the office if she has to do this.  3. She will follow-up with Dr. Lane in May 2022.    Follow Up:  Return for Keep May 2023 with Dr. Lane.  Orders Placed This Encounter   Procedures   • ECG 12 Lead          I appreciate the opportunity to participate in this patient's care.    Thank you,  MARISEL Abernathy

## 2022-09-21 ENCOUNTER — OFFICE VISIT (OUTPATIENT)
Dept: SURGERY | Facility: CLINIC | Age: 77
End: 2022-09-21

## 2022-09-21 DIAGNOSIS — K81.9 CHOLECYSTITIS: Primary | ICD-10-CM

## 2022-09-21 PROCEDURE — 99024 POSTOP FOLLOW-UP VISIT: CPT | Performed by: SURGERY

## 2022-09-22 NOTE — PROGRESS NOTES
ASSESSMENT/PLAN:    77-year-old lady who is status post laparoscopic cholecystectomy for cholecystitis on 2022.  She did well following the procedure.  She is beginning to regain her energy but did have a fair amount of fatigue.  Incisions are in good order.  She has no lifting restrictions at this time aside from if she experiences significant pain in her abdomen with lifting that she should decrease the amount she is lifting.  She is okay to return to work from my standpoint.  I will see her back on an as-needed basis.  Her pathology was reviewed and benign.    CC:     Chief Complaint   Patient presents with   • Post-op     Laparoscopic cholecystectomy 22        HPI:    Overall recovering well.  She reports significant fatigue following the operation.  This is starting to improve.  She would like to return to work.    SOCIAL HISTORY:   Social Determinants of Health     Tobacco Use: Low Risk    • Smoking Tobacco Use: Never Smoker   • Smokeless Tobacco Use: Never Used   Alcohol Use: Not At Risk   • Frequency of Alcohol Consumption: Never   • Average Number of Drinks: Patient does not drink   • Frequency of Binge Drinking: Never   Financial Resource Strain: Not on file   Food Insecurity: Not on file   Transportation Needs: Not on file   Physical Activity: Not on file   Stress: Not on file   Social Connections: Not on file   Intimate Partner Violence: Not on file   Depression: Not at risk   • PHQ-2 Score: 0   Housing Stability: Not on file            OTHER SURGERY:  Past Surgical History:   Procedure Laterality Date   • APPENDECTOMY     • BLADDER SURGERY     • CARDIAC CATHETERIZATION      Normal. Performed at [facility].   •  SECTION     • CHOLECYSTECTOMY WITH INTRAOPERATIVE CHOLANGIOGRAM N/A 2022    Procedure: CHOLECYSTECTOMY LAPAROSCOPIC INTRAOPERATIVE CHOLANGIOGRAM;  Surgeon: Rl Ferro MD;  Location: [facility];  Service: General;  Laterality: N/A;   • COLONOSCOPY     •  HYSTERECTOMY     • VEIN LIGATION AND STRIPPING  2019 6/04        PAST MEDICAL HISTORY:    Past Medical History:   Diagnosis Date   • Cardiomyopathy (HCC)    • Chronic cystitis    • Cough    • Depression    • GERD (gastroesophageal reflux disease)    • Hyperlipidemia    • Hypertension    • Scarlet fever    • Spinal headache    • Strain of thoracic region    • Urethral prolapse    • URI (upper respiratory infection)    • Urinary retention         MEDICATIONS:   Current Outpatient Medications on File Prior to Visit   Medication Sig Dispense Refill   • carvedilol (COREG) 3.125 MG tablet Take 1 tablet by mouth 2 (Two) Times a Day. 60 tablet 11   • conjugated estrogens (PREMARIN) 0.625 MG/GM vaginal cream Insert 0.625 g into the vagina Every Night. Monday Wednesday friday     • esomeprazole (NexIUM) 40 MG capsule Take 40 mg by mouth every morning before breakfast.     • fluticasone (FLONASE) 50 MCG/ACT nasal spray 2 sprays into the nostril(s) as directed by provider As Needed.     • levothyroxine (Synthroid) 50 MCG tablet Take 1 tablet by mouth Daily. 30 tablet 6   • rosuvastatin (CRESTOR) 20 MG tablet Take 1.5 tablets by mouth Every Night. 30 tablet 11     No current facility-administered medications on file prior to visit.        ALLERGIES:   Allergies   Allergen Reactions   • Augmentin [Amoxicillin-Pot Clavulanate] Rash   • Biaxin [Clarithromycin] Rash        PHYSICAL EXAM:   • Constitutional: Well-developed well-nourished, no acute distress  • Gastrointestinal: Soft, nontender, incisions in good order      Rl Ferro M.D.  General and Endoscopic Surgery  [facility]    [address] [place] [address], [address]  [place], [place], [facility]  P: [phone]  F: [phone]

## 2022-10-26 RX ORDER — ROSUVASTATIN CALCIUM 20 MG/1
30 TABLET, COATED ORAL DAILY
Qty: 135 TABLET | Refills: 2 | Status: SHIPPED | OUTPATIENT
Start: 2022-10-26 | End: 2022-12-07

## 2022-10-26 NOTE — TELEPHONE ENCOUNTER
Next OV-05/12/23-RM  Last OV-09/09/22-MM    Plan:  1. I made no medication changes today.  2. We discussed she could take a dose of Lasix on days she has leg swelling, dyspnea, or weight gain.  I did ask her to notify the office if she has to do this.  3. She will follow-up with Dr. Lane in May 2022.     Follow Up:  Return for Keep May 2023 with Dr. Lane.

## 2022-10-31 ENCOUNTER — TELEPHONE (OUTPATIENT)
Dept: FAMILY MEDICINE CLINIC | Facility: CLINIC | Age: 77
End: 2022-10-31

## 2022-10-31 DIAGNOSIS — E03.9 ACQUIRED HYPOTHYROIDISM: ICD-10-CM

## 2022-10-31 DIAGNOSIS — Z00.00 MEDICARE ANNUAL WELLNESS VISIT, SUBSEQUENT: Primary | ICD-10-CM

## 2022-10-31 NOTE — TELEPHONE ENCOUNTER
Caller: Codie Munson    Relationship: Self    Best call back number: [phone]    What orders are you requesting (i.e. lab or imaging): LABS ORDERS    In what timeframe would the patient need to come in: THIS WEEK    Where will you receive your lab/imaging services: [facility]    Additional notes: PATIENT IS REQUESTING LAB ORDERS TO BE ENTERED SO THAT SHE MAY HAVE LABS COMPLETED PRIOR TO WELLNESS EXAM ON 11/10/22.

## 2022-11-03 ENCOUNTER — LAB (OUTPATIENT)
Dept: LAB | Facility: HOSPITAL | Age: 77
End: 2022-11-03

## 2022-11-03 DIAGNOSIS — E03.9 ACQUIRED HYPOTHYROIDISM: ICD-10-CM

## 2022-11-03 DIAGNOSIS — Z00.00 MEDICARE ANNUAL WELLNESS VISIT, SUBSEQUENT: ICD-10-CM

## 2022-11-03 LAB
ALBUMIN SERPL-MCNC: 4.3 G/DL (ref 3.5–5.2)
ALBUMIN/GLOB SERPL: 2 G/DL
ALP SERPL-CCNC: 97 U/L (ref 39–117)
ALT SERPL W P-5'-P-CCNC: 14 U/L (ref 1–33)
ANION GAP SERPL CALCULATED.3IONS-SCNC: 9 MMOL/L (ref 5–15)
AST SERPL-CCNC: 18 U/L (ref 1–32)
BASOPHILS # BLD AUTO: 0.07 10*3/MM3 (ref 0–0.2)
BASOPHILS NFR BLD AUTO: 1.1 % (ref 0–1.5)
BILIRUB SERPL-MCNC: 0.2 MG/DL (ref 0–1.2)
BUN SERPL-MCNC: 11 MG/DL (ref 8–23)
BUN/CREAT SERPL: 13.6 (ref 7–25)
CALCIUM SPEC-SCNC: 9.1 MG/DL (ref 8.6–10.5)
CHLORIDE SERPL-SCNC: 102 MMOL/L (ref 98–107)
CHOLEST SERPL-MCNC: 164 MG/DL (ref 0–200)
CO2 SERPL-SCNC: 30 MMOL/L (ref 22–29)
CREAT SERPL-MCNC: 0.81 MG/DL (ref 0.57–1)
DEPRECATED RDW RBC AUTO: 42.8 FL (ref 37–54)
EGFRCR SERPLBLD CKD-EPI 2021: 73.9 ML/MIN/1.73
EOSINOPHIL # BLD AUTO: 0.36 10*3/MM3 (ref 0–0.4)
EOSINOPHIL NFR BLD AUTO: 5.4 % (ref 0.3–6.2)
ERYTHROCYTE [DISTWIDTH] IN BLOOD BY AUTOMATED COUNT: 13.5 % (ref 12.3–15.4)
GLOBULIN UR ELPH-MCNC: 2.2 GM/DL
GLUCOSE SERPL-MCNC: 83 MG/DL (ref 65–99)
HCT VFR BLD AUTO: 39.3 % (ref 34–46.6)
HDLC SERPL-MCNC: 49 MG/DL (ref 40–60)
HGB BLD-MCNC: 12.9 G/DL (ref 12–15.9)
IMM GRANULOCYTES # BLD AUTO: 0.01 10*3/MM3 (ref 0–0.05)
IMM GRANULOCYTES NFR BLD AUTO: 0.2 % (ref 0–0.5)
LDLC SERPL CALC-MCNC: 81 MG/DL (ref 0–100)
LDLC/HDLC SERPL: 1.53 {RATIO}
LYMPHOCYTES # BLD AUTO: 2.28 10*3/MM3 (ref 0.7–3.1)
LYMPHOCYTES NFR BLD AUTO: 34.2 % (ref 19.6–45.3)
MCH RBC QN AUTO: 28.5 PG (ref 26.6–33)
MCHC RBC AUTO-ENTMCNC: 32.8 G/DL (ref 31.5–35.7)
MCV RBC AUTO: 86.9 FL (ref 79–97)
MONOCYTES # BLD AUTO: 0.48 10*3/MM3 (ref 0.1–0.9)
MONOCYTES NFR BLD AUTO: 7.2 % (ref 5–12)
NEUTROPHILS NFR BLD AUTO: 3.46 10*3/MM3 (ref 1.7–7)
NEUTROPHILS NFR BLD AUTO: 51.9 % (ref 42.7–76)
NRBC BLD AUTO-RTO: 0 /100 WBC (ref 0–0.2)
PLATELET # BLD AUTO: 306 10*3/MM3 (ref 140–450)
PMV BLD AUTO: 10.4 FL (ref 6–12)
POTASSIUM SERPL-SCNC: 4.1 MMOL/L (ref 3.5–5.2)
PROT SERPL-MCNC: 6.5 G/DL (ref 6–8.5)
RBC # BLD AUTO: 4.52 10*6/MM3 (ref 3.77–5.28)
SODIUM SERPL-SCNC: 141 MMOL/L (ref 136–145)
T-UPTAKE NFR SERPL: 0.93 TBI (ref 0.8–1.3)
T4 SERPL-MCNC: 7.91 MCG/DL (ref 4.5–11.7)
TRIGL SERPL-MCNC: 200 MG/DL (ref 0–150)
TSH SERPL DL<=0.05 MIU/L-ACNC: 1.62 UIU/ML (ref 0.27–4.2)
VLDLC SERPL-MCNC: 34 MG/DL (ref 5–40)
WBC NRBC COR # BLD: 6.66 10*3/MM3 (ref 3.4–10.8)

## 2022-11-03 PROCEDURE — 85025 COMPLETE CBC W/AUTO DIFF WBC: CPT

## 2022-11-03 PROCEDURE — 84443 ASSAY THYROID STIM HORMONE: CPT

## 2022-11-03 PROCEDURE — 84479 ASSAY OF THYROID (T3 OR T4): CPT

## 2022-11-03 PROCEDURE — 36415 COLL VENOUS BLD VENIPUNCTURE: CPT

## 2022-11-03 PROCEDURE — 80061 LIPID PANEL: CPT

## 2022-11-03 PROCEDURE — 84436 ASSAY OF TOTAL THYROXINE: CPT

## 2022-11-03 PROCEDURE — 80053 COMPREHEN METABOLIC PANEL: CPT

## 2022-11-10 ENCOUNTER — OFFICE VISIT (OUTPATIENT)
Dept: FAMILY MEDICINE CLINIC | Facility: CLINIC | Age: 77
End: 2022-11-10

## 2022-11-10 VITALS
RESPIRATION RATE: 15 BRPM | WEIGHT: 152 LBS | HEART RATE: 71 BPM | OXYGEN SATURATION: 100 % | DIASTOLIC BLOOD PRESSURE: 70 MMHG | BODY MASS INDEX: 25.95 KG/M2 | SYSTOLIC BLOOD PRESSURE: 120 MMHG | TEMPERATURE: 98.7 F | HEIGHT: 64 IN

## 2022-11-10 DIAGNOSIS — Z00.00 MEDICARE ANNUAL WELLNESS VISIT, SUBSEQUENT: Primary | ICD-10-CM

## 2022-11-10 DIAGNOSIS — Z23 NEED FOR PNEUMOCOCCAL VACCINATION: ICD-10-CM

## 2022-11-10 DIAGNOSIS — E03.9 ACQUIRED HYPOTHYROIDISM: ICD-10-CM

## 2022-11-10 DIAGNOSIS — H61.21 IMPACTED CERUMEN OF RIGHT EAR: ICD-10-CM

## 2022-11-10 DIAGNOSIS — I10 ESSENTIAL HYPERTENSION: ICD-10-CM

## 2022-11-10 DIAGNOSIS — E78.2 MIXED HYPERLIPIDEMIA: ICD-10-CM

## 2022-11-10 PROCEDURE — 90677 PCV20 VACCINE IM: CPT | Performed by: PHYSICIAN ASSISTANT

## 2022-11-10 PROCEDURE — 1160F RVW MEDS BY RX/DR IN RCRD: CPT | Performed by: PHYSICIAN ASSISTANT

## 2022-11-10 PROCEDURE — G0439 PPPS, SUBSEQ VISIT: HCPCS | Performed by: PHYSICIAN ASSISTANT

## 2022-11-10 PROCEDURE — 1126F AMNT PAIN NOTED NONE PRSNT: CPT | Performed by: PHYSICIAN ASSISTANT

## 2022-11-10 PROCEDURE — 1170F FXNL STATUS ASSESSED: CPT | Performed by: PHYSICIAN ASSISTANT

## 2022-11-10 PROCEDURE — G0009 ADMIN PNEUMOCOCCAL VACCINE: HCPCS | Performed by: PHYSICIAN ASSISTANT

## 2022-11-10 PROCEDURE — 69209 REMOVE IMPACTED EAR WAX UNI: CPT | Performed by: PHYSICIAN ASSISTANT

## 2022-11-10 NOTE — PROGRESS NOTES
The ABCs of the Annual Wellness Visit  Subsequent Medicare Wellness Visit    Chief Complaint   Patient presents with   • Medicare Wellness-subsequent   • Hypothyroidism     management   • Hypertension     management   • Hyperlipidemia     management      Subjective    History of Present Illness:  Codie Munson is a 77 y.o. female who presents for a Subsequent Medicare Wellness Visit,Hypertension,Hyperlipidemia,and Hypothyrodism management.  Codie had her gallbladder removed on 2022.  She was at [facility] till 2022.  States her bowel movements have been looser since her cholecystectomy.  Bowel movements have been daily without dark black tarry stools.  Overall she is feeling much better.  Would like her pneumonia shot today.  Recently had her flu shot at pharmacy.  Codie denied any chest pain, shortness of air, dizziness, vision changes, swelling of ankles, fevers, chills, upper respiratory symptoms, abdominal pain, GI upset or urinary symptoms.  Sees Dr. Lane's office for cardiology needs.  Has no complaints today.    The following portions of the patient's history were reviewed and   updated as appropriate:   She  has a past medical history of Cardiomyopathy (HCC), Chronic cystitis, Cough, Depression, GERD (gastroesophageal reflux disease), Hyperlipidemia, Hypertension, Scarlet fever, Spinal headache, Strain of thoracic region, Urethral prolapse, URI (upper respiratory infection), and Urinary retention.  She does not have any pertinent problems on file.  She  has a past surgical history that includes Appendectomy;  section; Bladder surgery; Cardiac catheterization; Vein ligation and stripping (); Hysterectomy; Colonoscopy; and cholecystectomy with intraoperative cholangiogram (N/A, 2022).  Her family history includes Aneurysm in her father; Cancer in her brother and mother; Crohn's disease in her sister; Heart disease in her father; Heart  failure in her mother; Kidney failure in her mother; Prostate cancer in her brother; Stroke in her father.  She  reports that she has never smoked. She has never used smokeless tobacco. Drug use questions deferred to the physician. She reports that she does not drink alcohol.  Current Outpatient Medications   Medication Sig Dispense Refill   • carvedilol (COREG) 3.125 MG tablet Take 1 tablet by mouth 2 (Two) Times a Day. 60 tablet 11   • conjugated estrogens (PREMARIN) 0.625 MG/GM vaginal cream Insert 0.625 g into the vagina Every Night. Monday Wednesday friday     • esomeprazole (NexIUM) 40 MG capsule Take 40 mg by mouth every morning before breakfast.     • fluticasone (FLONASE) 50 MCG/ACT nasal spray 2 sprays into the nostril(s) as directed by provider As Needed.     • levothyroxine (Synthroid) 50 MCG tablet Take 1 tablet by mouth Daily. 30 tablet 6   • rosuvastatin (CRESTOR) 20 MG tablet Take 1.5 tablets by mouth Daily. 135 tablet 2     No current facility-administered medications for this visit.     Current Outpatient Medications on File Prior to Visit   Medication Sig   • carvedilol (COREG) 3.125 MG tablet Take 1 tablet by mouth 2 (Two) Times a Day.   • conjugated estrogens (PREMARIN) 0.625 MG/GM vaginal cream Insert 0.625 g into the vagina Every Night. Monday Wednesday friday   • esomeprazole (NexIUM) 40 MG capsule Take 40 mg by mouth every morning before breakfast.   • fluticasone (FLONASE) 50 MCG/ACT nasal spray 2 sprays into the nostril(s) as directed by provider As Needed.   • levothyroxine (Synthroid) 50 MCG tablet Take 1 tablet by mouth Daily.   • rosuvastatin (CRESTOR) 20 MG tablet Take 1.5 tablets by mouth Daily.     No current facility-administered medications on file prior to visit.     She is allergic to augmentin [amoxicillin-pot clavulanate] and biaxin [clarithromycin]..    Compared to one year ago, the patient feels her physical   health is better.    Compared to one year ago, the patient feels  her mental   health is better.    Recent Hospitalizations:  She was admitted within the past 365 days at [facility].  Admitted September 4,2022 and discharged from September 7,2022.      Current Medical Providers:  Patient Care Team:  Sri Leach PA-C as PCP - General (Family Medicine)    Outpatient Medications Prior to Visit   Medication Sig Dispense Refill   • carvedilol (COREG) 3.125 MG tablet Take 1 tablet by mouth 2 (Two) Times a Day. 60 tablet 11   • conjugated estrogens (PREMARIN) 0.625 MG/GM vaginal cream Insert 0.625 g into the vagina Every Night. Monday Wednesday friday     • esomeprazole (NexIUM) 40 MG capsule Take 40 mg by mouth every morning before breakfast.     • fluticasone (FLONASE) 50 MCG/ACT nasal spray 2 sprays into the nostril(s) as directed by provider As Needed.     • levothyroxine (Synthroid) 50 MCG tablet Take 1 tablet by mouth Daily. 30 tablet 6   • rosuvastatin (CRESTOR) 20 MG tablet Take 1.5 tablets by mouth Daily. 135 tablet 2     No facility-administered medications prior to visit.       No opioid medication identified on active medication list. I have reviewed chart for other potential  high risk medication/s and harmful drug interactions in the elderly.          Aspirin is not on active medication list.  Aspirin use is not indicated based on review of current medical condition/s. Risk of harm outweighs potential benefits.  .    Patient Active Problem List   Diagnosis   • Myocardiopathy (HCC)   • Bladder infection, chronic   • Blues   • Gastro-esophageal reflux disease without esophagitis   • HLD (hyperlipidemia)   • Prolapse of urethra   • Incomplete bladder emptying   • Gastroesophageal reflux disease with hiatal hernia   • Erosive gastritis   • Diverticulosis of large intestine without hemorrhage   • Chronic UTI   • Foot sprain   • Medicare annual wellness visit, subsequent   • Screening mammogram, encounter for   • Lower abdominal pain   • Hematuria   • Left  "knee pain   • Chondromalacia of patellofemoral joint, left   • Acute left ankle pain   • Varicose veins of both lower extremities   • Pain and swelling of left lower leg   • Postmenopausal   • Hiatal hernia   • Diverticulitis of colon   • Acquired hypothyroidism   • PVC (premature ventricular contraction)   • Acute cholecystitis   • Cardiomyopathy (HCC)   • Essential hypertension   • Acute on chronic combined systolic and diastolic CHF (congestive heart failure) (HCC)   • Hypothyroidism due to acquired atrophy of thyroid     Advance Care Planning  Advance Directive is not on file.  ACP discussion was held with the patient during this visit. Patient does not have an advance directive, declines further assistance.    Review of Systems   Constitutional: Negative.    HENT: Negative.    Eyes: Negative.    Respiratory: Negative.    Cardiovascular: Negative.    Gastrointestinal: Negative.    Endocrine: Negative.    Genitourinary: Negative.    Musculoskeletal: Negative.    Skin: Negative.    Allergic/Immunologic: Negative.    Neurological: Negative.    Hematological: Negative.    Psychiatric/Behavioral: Negative.    All other systems reviewed and are negative.       Objective    Vitals:    11/10/22 1440   BP: 120/70   Pulse: 71   Resp: 15   Temp: 98.7 °F (37.1 °C)   SpO2: 100%   Weight: 68.9 kg (152 lb)   Height: 162.6 cm (64\")   PainSc: 0-No pain     Estimated body mass index is 26.09 kg/m² as calculated from the following:    Height as of this encounter: 162.6 cm (64\").    Weight as of this encounter: 68.9 kg (152 lb).    BMI is >= 25 and <30. (Overweight) The following options were offered after discussion;: weight loss educational material (shared in after visit summary) and exercise counseling/recommendations      Does the patient have evidence of cognitive impairment? No    Physical Exam  Vitals and nursing note reviewed.   Constitutional:       Appearance: Normal appearance. She is well-developed, well-groomed and " overweight.      Interventions: Face mask in place.   HENT:      Head: Normocephalic and atraumatic.      Jaw: There is normal jaw occlusion.      Right Ear: Hearing, tympanic membrane and external ear normal. There is impacted cerumen.      Left Ear: Hearing, tympanic membrane, ear canal and external ear normal.      Ears:      Comments: Right ear:  After ear lavage,she had a normal ear exam.       Nose: Nose normal.      Right Sinus: No maxillary sinus tenderness or frontal sinus tenderness.      Left Sinus: No maxillary sinus tenderness or frontal sinus tenderness.      Mouth/Throat:      Lips: Pink.      Mouth: Mucous membranes are moist.      Dentition: Normal dentition.      Tongue: No lesions.      Pharynx: Oropharynx is clear. Uvula midline.      Tonsils: No tonsillar exudate.   Eyes:      General: Lids are normal.      Conjunctiva/sclera: Conjunctivae normal.      Pupils: Pupils are equal, round, and reactive to light.   Neck:      Thyroid: No thyroid mass, thyromegaly or thyroid tenderness.      Vascular: No carotid bruit.      Trachea: Trachea and phonation normal. No tracheal tenderness.   Cardiovascular:      Rate and Rhythm: Normal rate and regular rhythm.      Pulses: Normal pulses.      Heart sounds: Normal heart sounds, S1 normal and S2 normal. No murmur heard.  Pulmonary:      Effort: Pulmonary effort is normal.      Breath sounds: Normal breath sounds and air entry.   Abdominal:      General: Bowel sounds are normal.      Palpations: Abdomen is soft.      Tenderness: There is no abdominal tenderness. There is no right CVA tenderness, left CVA tenderness, guarding or rebound. Negative signs include Martin's sign, Rovsing's sign, McBurney's sign, psoas sign and obturator sign.   Musculoskeletal:      Cervical back: Neck supple.      Right lower leg: No edema.      Left lower leg: No edema.   Lymphadenopathy:      Cervical: No cervical adenopathy.      Right cervical: No superficial, deep or  posterior cervical adenopathy.     Left cervical: No superficial, deep or posterior cervical adenopathy.   Skin:     General: Skin is warm and dry.      Capillary Refill: Capillary refill takes less than 2 seconds.   Neurological:      Mental Status: She is alert and oriented to person, place, and time.      Deep Tendon Reflexes:      Reflex Scores:       Patellar reflexes are 2+ on the right side and 2+ on the left side.  Psychiatric:         Attention and Perception: Attention and perception normal.         Mood and Affect: Mood and affect normal.         Speech: Speech normal.         Behavior: Behavior is cooperative.         Thought Content: Thought content normal.         Cognition and Memory: Cognition and memory normal.         Judgment: Judgment normal.     I wore a facial mask during this patient encounter.  Patient also wearing a surgical mask.  Hand hygiene performed before and after seeing the patient.           Ear Cerumen Removal    Date/Time: 11/10/2022 3:27 PM  Performed by: Sri Leach PA-C  Authorized by: Sri Leach PA-C   Consent: Verbal consent obtained. Written consent not obtained.  Risks and benefits: risks, benefits and alternatives were discussed  Consent given by: patient  Patient understanding: patient states understanding of the procedure being performed  Patient consent: the patient's understanding of the procedure matches consent given  Procedure consent: procedure consent does not match procedure scheduled  Relevant documents: relevant documents not present or verified  Test results: test results not available  Site marked: the operative site was marked  Imaging studies: imaging studies not available  Patient identity confirmed: verbally with patient    Anesthesia:  Local Anesthetic: none  Location details: right ear  Patient tolerance: patient tolerated the procedure well with no immediate complications  Comments: Right ear lavage was performed by Darlin Cao MA.   I have examined ear after ear lavage.  Had total resolution of cerumen impaction.  Instructed not to use Q-tips.    Procedure type: irrigation   Sedation:  Patient sedated: no              Lab Results   Component Value Date    TRIG 200 (H) 11/03/2022    HDL 49 11/03/2022    LDL 81 11/03/2022    VLDL 34 11/03/2022        Lab on 11/03/2022   Component Date Value Ref Range Status   • TSH 11/03/2022 1.620  0.270 - 4.200 uIU/mL Final   • T Uptake 11/03/2022 0.93  0.80 - 1.30 TBI Final   • T4, Total 11/03/2022 7.91  4.50 - 11.70 mcg/dL Final    T4 results may be falsely increased if patient taking Biotin.   • Glucose 11/03/2022 83  65 - 99 mg/dL Final   • BUN 11/03/2022 11  8 - 23 mg/dL Final   • Creatinine 11/03/2022 0.81  0.57 - 1.00 mg/dL Final   • Sodium 11/03/2022 141  136 - 145 mmol/L Final   • Potassium 11/03/2022 4.1  3.5 - 5.2 mmol/L Final   • Chloride 11/03/2022 102  98 - 107 mmol/L Final   • CO2 11/03/2022 30.0 (H)  22.0 - 29.0 mmol/L Final   • Calcium 11/03/2022 9.1  8.6 - 10.5 mg/dL Final   • Total Protein 11/03/2022 6.5  6.0 - 8.5 g/dL Final   • Albumin 11/03/2022 4.30  3.50 - 5.20 g/dL Final   • ALT (SGPT) 11/03/2022 14  1 - 33 U/L Final   • AST (SGOT) 11/03/2022 18  1 - 32 U/L Final   • Alkaline Phosphatase 11/03/2022 97  39 - 117 U/L Final   • Total Bilirubin 11/03/2022 0.2  0.0 - 1.2 mg/dL Final   • Globulin 11/03/2022 2.2  gm/dL Final   • A/G Ratio 11/03/2022 2.0  g/dL Final   • BUN/Creatinine Ratio 11/03/2022 13.6  7.0 - 25.0 Final   • Anion Gap 11/03/2022 9.0  5.0 - 15.0 mmol/L Final   • eGFR 11/03/2022 73.9  >60.0 mL/min/1.73 Final    National Kidney Foundation and American Society of Nephrology (ASN) Task Force recommended calculation based on the Chronic Kidney Disease Epidemiology Collaboration (CKD-EPI) equation refit without adjustment for race.   • Total Cholesterol 11/03/2022 164  0 - 200 mg/dL Final   • Triglycerides 11/03/2022 200 (H)  0 - 150 mg/dL Final   • HDL Cholesterol 11/03/2022 49   40 - 60 mg/dL Final   • LDL Cholesterol  11/03/2022 81  0 - 100 mg/dL Final   • VLDL Cholesterol 11/03/2022 34  5 - 40 mg/dL Final   • LDL/HDL Ratio 11/03/2022 1.53   Final   • WBC 11/03/2022 6.66  3.40 - 10.80 10*3/mm3 Final   • RBC 11/03/2022 4.52  3.77 - 5.28 10*6/mm3 Final   • Hemoglobin 11/03/2022 12.9  12.0 - 15.9 g/dL Final   • Hematocrit 11/03/2022 39.3  34.0 - 46.6 % Final   • MCV 11/03/2022 86.9  79.0 - 97.0 fL Final   • MCH 11/03/2022 28.5  26.6 - 33.0 pg Final   • MCHC 11/03/2022 32.8  31.5 - 35.7 g/dL Final   • RDW 11/03/2022 13.5  12.3 - 15.4 % Final   • RDW-SD 11/03/2022 42.8  37.0 - 54.0 fl Final   • MPV 11/03/2022 10.4  6.0 - 12.0 fL Final   • Platelets 11/03/2022 306  140 - 450 10*3/mm3 Final   • Neutrophil % 11/03/2022 51.9  42.7 - 76.0 % Final   • Lymphocyte % 11/03/2022 34.2  19.6 - 45.3 % Final   • Monocyte % 11/03/2022 7.2  5.0 - 12.0 % Final   • Eosinophil % 11/03/2022 5.4  0.3 - 6.2 % Final   • Basophil % 11/03/2022 1.1  0.0 - 1.5 % Final   • Immature Grans % 11/03/2022 0.2  0.0 - 0.5 % Final   • Neutrophils, Absolute 11/03/2022 3.46  1.70 - 7.00 10*3/mm3 Final   • Lymphocytes, Absolute 11/03/2022 2.28  0.70 - 3.10 10*3/mm3 Final   • Monocytes, Absolute 11/03/2022 0.48  0.10 - 0.90 10*3/mm3 Final   • Eosinophils, Absolute 11/03/2022 0.36  0.00 - 0.40 10*3/mm3 Final   • Basophils, Absolute 11/03/2022 0.07  0.00 - 0.20 10*3/mm3 Final   • Immature Grans, Absolute 11/03/2022 0.01  0.00 - 0.05 10*3/mm3 Final   • nRBC 11/03/2022 0.0  0.0 - 0.2 /100 WBC Final         HEALTH RISK ASSESSMENT    Smoking Status:  Social History     Tobacco Use   Smoking Status Never   Smokeless Tobacco Never   Tobacco Comments    drink caffiene daily     Alcohol Consumption:  Social History     Substance and Sexual Activity   Alcohol Use No     Fall Risk Screen:    STEADI Fall Risk Assessment was completed, and patient is at LOW risk for falls.Assessment completed on:11/10/2022    Depression Screening:  PHQ-2/PHQ-9  Depression Screening 11/10/2022   Retired PHQ-9 Total Score -   Retired Total Score -   Little Interest or Pleasure in Doing Things 0-->not at all   Feeling Down, Depressed or Hopeless 0-->not at all   PHQ-9: Brief Depression Severity Measure Score 0       Health Habits and Functional and Cognitive Screening:  Functional & Cognitive Status 11/10/2022   Do you have difficulty preparing food and eating? No   Do you have difficulty bathing yourself, getting dressed or grooming yourself? No   Do you have difficulty using the toilet? No   Do you have difficulty moving around from place to place? No   Do you have trouble with steps or getting out of a bed or a chair? No   Current Diet Well Balanced Diet   Dental Exam Up to date   Eye Exam Up to date   Exercise (times per week) 0 times per week   Current Exercises Include No Regular Exercise   Current Exercise Activities Include -   Do you need help using the phone?  No   Are you deaf or do you have serious difficulty hearing?  No   Do you need help with transportation? No   Do you need help shopping? No   Do you need help preparing meals?  No   Do you need help with housework?  No   Do you need help with laundry? No   Do you need help taking your medications? No   Do you need help managing money? No   Do you ever drive or ride in a car without wearing a seat belt? No   Have you felt unusual stress, anger or loneliness in the last month? No   Who do you live with? Spouse   If you need help, do you have trouble finding someone available to you? No   Have you been bothered in the last four weeks by sexual problems? No   Do you have difficulty concentrating, remembering or making decisions? No       Age-appropriate Screening Schedule:  Refer to the list below for future screening recommendations based on patient's age, sex and/or medical conditions. Orders for these recommended tests are listed in the plan section. The patient has been provided with a written plan.    Health  Maintenance   Topic Date Due   • LIPID PANEL  11/03/2023   • DXA SCAN  11/15/2023   • INFLUENZA VACCINE  Completed   • MAMMOGRAM  Discontinued   • TDAP/TD VACCINES  Discontinued   • ZOSTER VACCINE  Discontinued              Assessment & Plan   CMS Preventative Services Quick Reference  Risk Factors Identified During Encounter  Cardiovascular Disease  Immunizations Discussed/Encouraged (specific Immunizations; Prevnar 20 (Pneumococcal 20-valent conjugate)  The above risks/problems have been discussed with the patient.  Follow up actions/plans if indicated are seen below in the Assessment/Plan Section.  Pertinent information has been shared with the patient in the After Visit Summary.    Diagnoses and all orders for this visit:    1. Medicare annual wellness visit, subsequent (Primary)    2. Acquired hypothyroidism  -     Thyroid Panel With TSH; Future    3. Essential hypertension    4. Mixed hyperlipidemia  -     CBC & Differential; Future  -     Lipid Panel With LDL / HDL Ratio; Future  -     Comprehensive Metabolic Panel; Future    5. Need for pneumococcal vaccination  -     Pneumococcal Conjugate Vaccine 20-Valent All    6. Impacted cerumen of right ear    Other orders  -     Ear Cerumen Removal    1.  Annual [facility] wellness exam with hyperlipidemia: I have reviewed above blood work with her today.  Stressed importance of decreasing her carbohydrates in diet.  She will continue current medication at home.  Plan to recheck fasting labs at [facility] in 6 months.  2.  Chronic and stable hypothyroidism: TSH is in therapeutic range.  We will continue current medication at home as directed.  3.  Chronic and stable hypertension: Blood pressure was 110/70 in left arm.  We will continue current blood pressure medication at home.  Keep follow-up appointments with cardiology.  4.  Need for pneumonia vaccination: She has given written consent to receive the Prevnar 20 today.  5.  New right  cerumen impacted ear: Had right ear lavage today with total resolution of cerumen impaction.  Instructed not to use Q-tips.  Follow Up:   Return in about 6 months (around 5/10/2023), or lipid and thyroid prior.     An After Visit Summary and PPPS were made available to the patient.              Patient Counseling:  --Nutrition: Stressed importance of moderation in sodium/caffeine intake, saturated fat and cholesterol.  Discussed caloric balance, sufficient intake of fresh fruits, vegetables, fiber,   calcium, iron.  --Discussed the new recommendation against daily use of baby aspirin for primary prevention in low risk patients.  --Exercise: Stressed the importance of regular exercise by incorporating into daily routine.    --Substance Abuse: Discussed cessation/primary prevention of tobacco, alcohol, or other drug use; driving or other dangerous activities under the influence.    --Dental health: Discussed importance of regular tooth brushing, flossing, and dental visits.  -- Suggested having eyes and vision checked if needed or past due.  --Immunizations reviewed.  --Discussed benefits of screening colonoscopy.    MP [facility]  [address] [place] [id number]  Dept: [phone]  Dept Fax: [phone]  Loc: [phone]  Loc Fax: [phone]

## 2022-12-07 RX ORDER — ROSUVASTATIN CALCIUM 20 MG/1
TABLET, COATED ORAL
Qty: 90 TABLET | Refills: 0 | Status: SHIPPED | OUTPATIENT
Start: 2022-12-07

## 2023-02-24 ENCOUNTER — HOSPITAL ENCOUNTER (OUTPATIENT)
Dept: GENERAL RADIOLOGY | Facility: HOSPITAL | Age: 78
Discharge: HOME | End: 2023-02-24
Admitting: PHYSICIAN ASSISTANT
Payer: MEDICARE

## 2023-02-24 ENCOUNTER — OFFICE VISIT (OUTPATIENT)
Dept: FAMILY MEDICINE CLINIC | Facility: CLINIC | Age: 78
End: 2023-02-24
Payer: MEDICARE

## 2023-02-24 VITALS
BODY MASS INDEX: 25.95 KG/M2 | TEMPERATURE: 98 F | HEIGHT: 64 IN | DIASTOLIC BLOOD PRESSURE: 70 MMHG | WEIGHT: 152 LBS | OXYGEN SATURATION: 100 % | SYSTOLIC BLOOD PRESSURE: 124 MMHG | HEART RATE: 90 BPM | RESPIRATION RATE: 16 BRPM

## 2023-02-24 DIAGNOSIS — R07.9 CHEST PAIN, UNSPECIFIED TYPE: Primary | ICD-10-CM

## 2023-02-24 DIAGNOSIS — R10.13 EPIGASTRIC PAIN: ICD-10-CM

## 2023-02-24 DIAGNOSIS — M79.18 MUSCLE PAIN, MYOFASCIAL: ICD-10-CM

## 2023-02-24 DIAGNOSIS — R07.9 CHEST PAIN, UNSPECIFIED TYPE: ICD-10-CM

## 2023-02-24 PROCEDURE — 71046 X-RAY EXAM CHEST 2 VIEWS: CPT

## 2023-02-24 PROCEDURE — 99214 OFFICE O/P EST MOD 30 MIN: CPT | Performed by: PHYSICIAN ASSISTANT

## 2023-02-24 PROCEDURE — 93000 ELECTROCARDIOGRAM COMPLETE: CPT | Performed by: PHYSICIAN ASSISTANT

## 2023-02-24 RX ORDER — CYCLOBENZAPRINE HCL 5 MG
TABLET ORAL
Qty: 30 TABLET | Refills: 0 | Status: SHIPPED | OUTPATIENT
Start: 2023-02-24 | End: 2023-03-15

## 2023-02-24 NOTE — PROGRESS NOTES
"Chief Complaint  Chest Pain    Subjective          Codie Munson presents to [facility]  History of Present Illness  Codie is a 77 year old female who presents who presents with chest pain with epigastric pain that has been occurring for the past 5-6 days off and on.  States her  and her had been cutting down some trees within the last week.  She was picking up limbs and log this from the tree.  She was doing a lot of repetitive motions.  Since this time she has had pain at base of sternal area that has gone up to mid chest area into mid back.  Denied any radiation of pain to her neck or down the arm.  Codie states since her gallbladder was removed she has had some bloating and gassiness off-and-on.  States whenever she eats will go through her pretty quickly.  Denied any cough, wheezing, shortness of breath, dizziness, nausea, vomiting, diarrhea or increased sweating.  At times the pain can be sharp especially if she touches her xiphoid process.  Has been compliant with her medications.  Sleep has been restless due to the discomfort.  Has not used any over-the-counter medications.  Appetite has been normal.   Review of Systems   Constitutional: Negative.    HENT: Negative.    Eyes: Negative.    Respiratory: Negative.    Cardiovascular: Positive for chest pain.   Gastrointestinal: Negative.    Endocrine: Negative.    Genitourinary: Negative.    Musculoskeletal: Positive for back pain.   Skin: Negative.    Allergic/Immunologic: Negative.    Hematological: Negative.    Psychiatric/Behavioral: Positive for sleep disturbance.   All other systems reviewed and are negative.    Objective   Vital Signs:   /70 (BP Location: Left arm, Patient Position: Sitting, Cuff Size: Adult)   Pulse 90   Temp 98 °F (36.7 °C)   Resp 16   Ht 162.6 cm (64\")   Wt 68.9 kg (152 lb)   SpO2 100%   BMI 26.09 kg/m²     Physical Exam  Vitals and nursing note reviewed.   Constitutional:       " Appearance: Normal appearance. She is well-developed and well-groomed.      Interventions: Face mask in place.   HENT:      Head: Normocephalic and atraumatic.   Neck:      Vascular: No carotid bruit.      Trachea: Trachea and phonation normal. No tracheal tenderness.   Cardiovascular:      Rate and Rhythm: Normal rate and regular rhythm.      Pulses: Normal pulses.      Heart sounds: Normal heart sounds, S1 normal and S2 normal. No murmur heard.  Pulmonary:      Effort: Pulmonary effort is normal.      Breath sounds: Normal breath sounds and air entry.   Chest:      Chest wall: Tenderness present. No mass, deformity, swelling, crepitus or edema.       Abdominal:      General: Bowel sounds are normal.      Palpations: Abdomen is soft. There is no hepatomegaly.      Tenderness: There is abdominal tenderness in the epigastric area. There is no right CVA tenderness, left CVA tenderness, guarding or rebound. Negative signs include Martin's sign, Rovsing's sign, McBurney's sign, psoas sign and obturator sign.       Musculoskeletal:      Cervical back: Neck supple.      Thoracic back: Tenderness present. No swelling, edema, deformity, signs of trauma, spasms or bony tenderness. Normal range of motion. No scoliosis.        Back:       Right lower leg: No edema.      Left lower leg: No edema.   Skin:     General: Skin is warm and dry.      Capillary Refill: Capillary refill takes less than 2 seconds.   Neurological:      Mental Status: She is alert and oriented to person, place, and time.   Psychiatric:         Attention and Perception: Attention and perception normal.         Mood and Affect: Mood and affect normal.         Speech: Speech normal.         Behavior: Behavior normal. Behavior is cooperative.         Thought Content: Thought content normal.         Cognition and Memory: Cognition and memory normal.         Judgment: Judgment normal.     I wore a facial mask during this patient encounter.  Patient also wearing  a surgical mask.  Hand hygiene performed before and after seeing the patient.     Result Review :            SCANNED EKG (09/04/2022)       ECG 12 Lead    Date/Time: 2/24/2023 9:18 AM  Performed by: Sri Leach PA-C  Authorized by: Sri Leach PA-C   Comparison: compared with previous ECG from 9/4/2022  Similar to previous ECG  Rhythm: sinus rhythm  Rate: normal  BPM: 65  Conduction: conduction normal  ST Segments: ST segments normal  T Waves: T waves normal  QRS axis: normal  Other findings comments: Nonspecific T wave abnormality    Clinical impression: normal ECG  Comments: Indication: New epigastric pain with chest pain  WI int:  196 ms  QRS dur:  91 ms  QT/QTc:  376/388 ms              Assessment and Plan    Diagnoses and all orders for this visit:    1. Chest pain, unspecified type (Primary)  -     XR Chest PA & Lateral; Future  -     ECG 12 Lead    2. Epigastric pain  -     XR Chest PA & Lateral; Future  -     ECG 12 Lead    Codie was seen in office today with new epigastric pain with chest pain: In office EKG showed normal sinus rhythm with nonspecific T wave abnormalities.  This was similar to EKG performed on August 4, 2022.  We will proceed with chest x-ray at [facility], [place], operated by [facility] for further evaluation.  I suspect she might have pulled muscles in her chest while picking up tree limbs/logs from when they were cutting tree down.  We will consider NSAIDs and muscle relaxer in future after imaging is completed.  She will be notified of chest x-ray results and any medication changes at that time.    Follow Up   No follow-ups on file.  Patient was given instructions and counseling regarding her condition or for health maintenance advice. Please see specific information pulled into the AVS if appropriate.     MP [facility]  [address] [address]  Dept: [phone]  Dept Fax: [phone]  Loc:  [phone]  Loc Fax: [phone]

## 2023-03-26 DIAGNOSIS — E03.9 ACQUIRED HYPOTHYROIDISM: ICD-10-CM

## 2023-03-27 RX ORDER — LEVOTHYROXINE SODIUM 0.05 MG/1
TABLET ORAL
Qty: 30 TABLET | Refills: 6 | Status: SHIPPED | OUTPATIENT
Start: 2023-03-27

## 2023-05-04 ENCOUNTER — LAB (OUTPATIENT)
Dept: LAB | Facility: HOSPITAL | Age: 78
End: 2023-05-04
Payer: MEDICARE

## 2023-05-04 DIAGNOSIS — E03.9 ACQUIRED HYPOTHYROIDISM: ICD-10-CM

## 2023-05-04 DIAGNOSIS — E78.2 MIXED HYPERLIPIDEMIA: ICD-10-CM

## 2023-05-04 LAB
ALBUMIN SERPL-MCNC: 4.3 G/DL (ref 3.5–5.2)
ALBUMIN/GLOB SERPL: 1.7 G/DL
ALP SERPL-CCNC: 91 U/L (ref 39–117)
ALT SERPL W P-5'-P-CCNC: 16 U/L (ref 1–33)
ANION GAP SERPL CALCULATED.3IONS-SCNC: 8 MMOL/L (ref 5–15)
AST SERPL-CCNC: 20 U/L (ref 1–32)
BASOPHILS # BLD AUTO: 0.06 10*3/MM3 (ref 0–0.2)
BASOPHILS NFR BLD AUTO: 1 % (ref 0–1.5)
BILIRUB SERPL-MCNC: 0.4 MG/DL (ref 0–1.2)
BUN SERPL-MCNC: 12 MG/DL (ref 8–23)
BUN/CREAT SERPL: 14.1 (ref 7–25)
CALCIUM SPEC-SCNC: 9.3 MG/DL (ref 8.6–10.5)
CHLORIDE SERPL-SCNC: 105 MMOL/L (ref 98–107)
CHOLEST SERPL-MCNC: 161 MG/DL (ref 0–200)
CO2 SERPL-SCNC: 29 MMOL/L (ref 22–29)
CREAT SERPL-MCNC: 0.85 MG/DL (ref 0.57–1)
DEPRECATED RDW RBC AUTO: 42.3 FL (ref 37–54)
EGFRCR SERPLBLD CKD-EPI 2021: 69.8 ML/MIN/1.73
EOSINOPHIL # BLD AUTO: 0.25 10*3/MM3 (ref 0–0.4)
EOSINOPHIL NFR BLD AUTO: 4.3 % (ref 0.3–6.2)
ERYTHROCYTE [DISTWIDTH] IN BLOOD BY AUTOMATED COUNT: 13.1 % (ref 12.3–15.4)
GLOBULIN UR ELPH-MCNC: 2.5 GM/DL
GLUCOSE SERPL-MCNC: 91 MG/DL (ref 65–99)
HCT VFR BLD AUTO: 41 % (ref 34–46.6)
HDLC SERPL-MCNC: 50 MG/DL (ref 40–60)
HGB BLD-MCNC: 13.4 G/DL (ref 12–15.9)
IMM GRANULOCYTES # BLD AUTO: 0.02 10*3/MM3 (ref 0–0.05)
IMM GRANULOCYTES NFR BLD AUTO: 0.3 % (ref 0–0.5)
LDLC SERPL CALC-MCNC: 81 MG/DL (ref 0–100)
LDLC/HDLC SERPL: 1.52 {RATIO}
LYMPHOCYTES # BLD AUTO: 2.52 10*3/MM3 (ref 0.7–3.1)
LYMPHOCYTES NFR BLD AUTO: 43.8 % (ref 19.6–45.3)
MCH RBC QN AUTO: 28.8 PG (ref 26.6–33)
MCHC RBC AUTO-ENTMCNC: 32.7 G/DL (ref 31.5–35.7)
MCV RBC AUTO: 88 FL (ref 79–97)
MONOCYTES # BLD AUTO: 0.33 10*3/MM3 (ref 0.1–0.9)
MONOCYTES NFR BLD AUTO: 5.7 % (ref 5–12)
NEUTROPHILS NFR BLD AUTO: 2.58 10*3/MM3 (ref 1.7–7)
NEUTROPHILS NFR BLD AUTO: 44.9 % (ref 42.7–76)
NRBC BLD AUTO-RTO: 0 /100 WBC (ref 0–0.2)
PLATELET # BLD AUTO: 301 10*3/MM3 (ref 140–450)
PMV BLD AUTO: 10.2 FL (ref 6–12)
POTASSIUM SERPL-SCNC: 4.1 MMOL/L (ref 3.5–5.2)
PROT SERPL-MCNC: 6.8 G/DL (ref 6–8.5)
RBC # BLD AUTO: 4.66 10*6/MM3 (ref 3.77–5.28)
SODIUM SERPL-SCNC: 142 MMOL/L (ref 136–145)
T-UPTAKE NFR SERPL: 1.06 TBI (ref 0.8–1.3)
T4 SERPL-MCNC: 7.89 MCG/DL (ref 4.5–11.7)
TRIGL SERPL-MCNC: 175 MG/DL (ref 0–150)
TSH SERPL DL<=0.05 MIU/L-ACNC: 1.18 UIU/ML (ref 0.27–4.2)
VLDLC SERPL-MCNC: 30 MG/DL (ref 5–40)
WBC NRBC COR # BLD: 5.76 10*3/MM3 (ref 3.4–10.8)

## 2023-05-04 PROCEDURE — 84436 ASSAY OF TOTAL THYROXINE: CPT

## 2023-05-04 PROCEDURE — 80053 COMPREHEN METABOLIC PANEL: CPT

## 2023-05-04 PROCEDURE — 84443 ASSAY THYROID STIM HORMONE: CPT

## 2023-05-04 PROCEDURE — 85025 COMPLETE CBC W/AUTO DIFF WBC: CPT

## 2023-05-04 PROCEDURE — 84479 ASSAY OF THYROID (T3 OR T4): CPT

## 2023-05-04 PROCEDURE — 80061 LIPID PANEL: CPT

## 2023-05-04 PROCEDURE — 36415 COLL VENOUS BLD VENIPUNCTURE: CPT

## 2023-05-11 ENCOUNTER — TELEPHONE (OUTPATIENT)
Dept: FAMILY MEDICINE CLINIC | Facility: CLINIC | Age: 78
End: 2023-05-11
Payer: MEDICARE

## 2023-05-11 ENCOUNTER — OFFICE VISIT (OUTPATIENT)
Dept: FAMILY MEDICINE CLINIC | Facility: CLINIC | Age: 78
End: 2023-05-11
Payer: MEDICARE

## 2023-05-11 VITALS
BODY MASS INDEX: 25.27 KG/M2 | HEART RATE: 85 BPM | SYSTOLIC BLOOD PRESSURE: 130 MMHG | WEIGHT: 148 LBS | HEIGHT: 64 IN | DIASTOLIC BLOOD PRESSURE: 86 MMHG | RESPIRATION RATE: 15 BRPM | TEMPERATURE: 98.2 F | OXYGEN SATURATION: 96 %

## 2023-05-11 DIAGNOSIS — I10 ESSENTIAL HYPERTENSION: Primary | ICD-10-CM

## 2023-05-11 DIAGNOSIS — E78.2 MIXED HYPERLIPIDEMIA: ICD-10-CM

## 2023-05-11 DIAGNOSIS — E03.9 ACQUIRED HYPOTHYROIDISM: ICD-10-CM

## 2023-05-11 PROBLEM — K81.0 ACUTE CHOLECYSTITIS: Status: RESOLVED | Noted: 2022-09-04 | Resolved: 2023-05-11

## 2023-05-11 PROCEDURE — 99213 OFFICE O/P EST LOW 20 MIN: CPT | Performed by: PHYSICIAN ASSISTANT

## 2023-05-11 PROCEDURE — 3075F SYST BP GE 130 - 139MM HG: CPT | Performed by: PHYSICIAN ASSISTANT

## 2023-05-11 PROCEDURE — 3079F DIAST BP 80-89 MM HG: CPT | Performed by: PHYSICIAN ASSISTANT

## 2023-05-11 PROCEDURE — 1159F MED LIST DOCD IN RCRD: CPT | Performed by: PHYSICIAN ASSISTANT

## 2023-05-11 PROCEDURE — 1160F RVW MEDS BY RX/DR IN RCRD: CPT | Performed by: PHYSICIAN ASSISTANT

## 2023-05-11 RX ORDER — FLUOCINONIDE TOPICAL SOLUTION USP, 0.05% 0.5 MG/ML
SOLUTION TOPICAL
COMMUNITY
Start: 2023-05-10

## 2023-05-11 NOTE — TELEPHONE ENCOUNTER
Pt is coming in on 11/13/23 for her AWV, she will go to [facility] the week prior for labs.

## 2023-05-11 NOTE — PROGRESS NOTES
"Chief Complaint  Hypothyroidism (management), Hypertension (management), and Hyperlipidemia (management)    Subjective          Codie Munson presents to [facility]  History of Present Illness  Codie is a 77 year old female who presents for hypothyroidism,hypertension and hyperlipidemia management.  Overall she is feeling well.  She has cut back on potato chips and trying to make healthier choices in diet.  Sleep has been normal.  Stays active by staying busy around the house, working at the school and jet skiing.  Denied any current chest pain, shortness of air, cough, wheezing, abdominal pain, GI upset, or swelling of ankles.  She has been compliant with medication.  Bowel movements have been normal without dark black tarry stools.     Objective   Vital Signs:   /86   Pulse 85   Temp 98.2 °F (36.8 °C)   Resp 15   Ht 162.6 cm (64.02\")   Wt 67.1 kg (148 lb)   SpO2 96%   BMI 25.39 kg/m²     Physical Exam  Vitals and nursing note reviewed.   Constitutional:       Appearance: Normal appearance. She is well-developed, well-groomed and normal weight.   HENT:      Head: Normocephalic and atraumatic.   Neck:      Thyroid: No thyroid mass, thyromegaly or thyroid tenderness.      Vascular: No carotid bruit.      Trachea: Trachea and phonation normal. No tracheal tenderness.   Cardiovascular:      Rate and Rhythm: Normal rate and regular rhythm.      Pulses: Normal pulses.      Heart sounds: Normal heart sounds, S1 normal and S2 normal. No murmur heard.  Pulmonary:      Effort: Pulmonary effort is normal.      Breath sounds: Normal breath sounds and air entry.   Abdominal:      General: Bowel sounds are normal.      Palpations: Abdomen is soft. There is no hepatomegaly.      Tenderness: There is no abdominal tenderness. There is no right CVA tenderness, left CVA tenderness, guarding or rebound. Negative signs include Martin's sign, Rovsing's sign, McBurney's sign, psoas sign " and obturator sign.   Musculoskeletal:      Cervical back: Neck supple.      Right lower leg: No edema.      Left lower leg: No edema.   Skin:     General: Skin is warm and dry.      Capillary Refill: Capillary refill takes less than 2 seconds.   Neurological:      Mental Status: She is alert and oriented to person, place, and time.   Psychiatric:         Attention and Perception: Attention and perception normal.         Mood and Affect: Mood and affect normal.         Speech: Speech normal.         Behavior: Behavior normal. Behavior is cooperative.         Thought Content: Thought content normal.         Cognition and Memory: Cognition and memory normal.         Judgment: Judgment normal.        Result Review :          Lab on 05/04/2023   Component Date Value Ref Range Status   • TSH 05/04/2023 1.180  0.270 - 4.200 uIU/mL Final   • T Uptake 05/04/2023 1.06  0.80 - 1.30 TBI Final   • T4, Total 05/04/2023 7.89  4.50 - 11.70 mcg/dL Final    T4 results may be falsely increased if patient taking Biotin.   • Total Cholesterol 05/04/2023 161  0 - 200 mg/dL Final   • Triglycerides 05/04/2023 175 (H)  0 - 150 mg/dL Final   • HDL Cholesterol 05/04/2023 50  40 - 60 mg/dL Final   • LDL Cholesterol  05/04/2023 81  0 - 100 mg/dL Final   • VLDL Cholesterol 05/04/2023 30  5 - 40 mg/dL Final   • LDL/HDL Ratio 05/04/2023 1.52   Final   • Glucose 05/04/2023 91  65 - 99 mg/dL Final   • BUN 05/04/2023 12  8 - 23 mg/dL Final   • Creatinine 05/04/2023 0.85  0.57 - 1.00 mg/dL Final   • Sodium 05/04/2023 142  136 - 145 mmol/L Final   • Potassium 05/04/2023 4.1  3.5 - 5.2 mmol/L Final   • Chloride 05/04/2023 105  98 - 107 mmol/L Final   • CO2 05/04/2023 29.0  22.0 - 29.0 mmol/L Final   • Calcium 05/04/2023 9.3  8.6 - 10.5 mg/dL Final   • Total Protein 05/04/2023 6.8  6.0 - 8.5 g/dL Final   • Albumin 05/04/2023 4.3  3.5 - 5.2 g/dL Final   • ALT (SGPT) 05/04/2023 16  1 - 33 U/L Final   • AST (SGOT) 05/04/2023 20  1 - 32 U/L Final   •  Alkaline Phosphatase 05/04/2023 91  39 - 117 U/L Final   • Total Bilirubin 05/04/2023 0.4  0.0 - 1.2 mg/dL Final   • Globulin 05/04/2023 2.5  gm/dL Final   • A/G Ratio 05/04/2023 1.7  g/dL Final   • BUN/Creatinine Ratio 05/04/2023 14.1  7.0 - 25.0 Final   • Anion Gap 05/04/2023 8.0  5.0 - 15.0 mmol/L Final   • eGFR 05/04/2023 69.8  >60.0 mL/min/1.73 Final   • WBC 05/04/2023 5.76  3.40 - 10.80 10*3/mm3 Final   • RBC 05/04/2023 4.66  3.77 - 5.28 10*6/mm3 Final   • Hemoglobin 05/04/2023 13.4  12.0 - 15.9 g/dL Final   • Hematocrit 05/04/2023 41.0  34.0 - 46.6 % Final   • MCV 05/04/2023 88.0  79.0 - 97.0 fL Final   • MCH 05/04/2023 28.8  26.6 - 33.0 pg Final   • MCHC 05/04/2023 32.7  31.5 - 35.7 g/dL Final   • RDW 05/04/2023 13.1  12.3 - 15.4 % Final   • RDW-SD 05/04/2023 42.3  37.0 - 54.0 fl Final   • MPV 05/04/2023 10.2  6.0 - 12.0 fL Final   • Platelets 05/04/2023 301  140 - 450 10*3/mm3 Final   • Neutrophil % 05/04/2023 44.9  42.7 - 76.0 % Final   • Lymphocyte % 05/04/2023 43.8  19.6 - 45.3 % Final   • Monocyte % 05/04/2023 5.7  5.0 - 12.0 % Final   • Eosinophil % 05/04/2023 4.3  0.3 - 6.2 % Final   • Basophil % 05/04/2023 1.0  0.0 - 1.5 % Final   • Immature Grans % 05/04/2023 0.3  0.0 - 0.5 % Final   • Neutrophils, Absolute 05/04/2023 2.58  1.70 - 7.00 10*3/mm3 Final   • Lymphocytes, Absolute 05/04/2023 2.52  0.70 - 3.10 10*3/mm3 Final   • Monocytes, Absolute 05/04/2023 0.33  0.10 - 0.90 10*3/mm3 Final   • Eosinophils, Absolute 05/04/2023 0.25  0.00 - 0.40 10*3/mm3 Final   • Basophils, Absolute 05/04/2023 0.06  0.00 - 0.20 10*3/mm3 Final   • Immature Grans, Absolute 05/04/2023 0.02  0.00 - 0.05 10*3/mm3 Final   • nRBC 05/04/2023 0.0  0.0 - 0.2 /100 WBC Final                   Assessment and Plan    Diagnoses and all orders for this visit:    1. Essential hypertension (Primary)    2. Mixed hyperlipidemia    3. Acquired hypothyroidism    1.  Chronic and stable hypertension: I have rechecked blood pressure today and  got 110/60 in the left arm.  Continue current blood pressure medication at home.  Keep appointment with cardiologist tomorrow.  2.  Chronic and improving hyperlipidemia: I have reviewed above blood work with her.  Cholesterol and triglyceride readings have been reviewed.  She will continue her Crestor medication at home.  Plan to recheck fasting lab work in 6 months.  She was given encouragement and praise for decreasing her take the chip intake.  Codie will continue to make healthy choices.  3.  Chronic and stable hypothyroidism: Above TSH is in therapeutic range.  Continue current medication at home as directed.      I spent 20 minutes caring for Codie on this date of service. This time includes time spent by me in the following activities:preparing for the visit, reviewing tests, obtaining and/or reviewing a separately obtained history, performing a medically appropriate examination and/or evaluation  and documenting information in the medical record  Follow Up   Return in about 6 months (around 11/11/2023), or labs prior, for Medicare Wellness.  Patient was given instructions and counseling regarding her condition or for health maintenance advice. Please see specific information pulled into the AVS if appropriate.     MP [facility]  [address] [address]  Dept: [phone]  Dept Fax: [phone]  Loc: [phone]  Loc Fax: [phone]

## 2023-05-12 ENCOUNTER — OFFICE VISIT (OUTPATIENT)
Dept: CARDIOLOGY | Facility: CLINIC | Age: 78
End: 2023-05-12
Payer: MEDICARE

## 2023-05-12 VITALS
HEIGHT: 64 IN | BODY MASS INDEX: 24.92 KG/M2 | SYSTOLIC BLOOD PRESSURE: 124 MMHG | WEIGHT: 146 LBS | DIASTOLIC BLOOD PRESSURE: 80 MMHG | HEART RATE: 81 BPM

## 2023-05-12 DIAGNOSIS — I42.9 CARDIOMYOPATHY, UNSPECIFIED TYPE: Primary | ICD-10-CM

## 2023-05-12 DIAGNOSIS — E78.2 MIXED HYPERLIPIDEMIA: ICD-10-CM

## 2023-05-12 DIAGNOSIS — I10 ESSENTIAL HYPERTENSION: ICD-10-CM

## 2023-05-12 PROCEDURE — 93000 ELECTROCARDIOGRAM COMPLETE: CPT | Performed by: INTERNAL MEDICINE

## 2023-05-12 PROCEDURE — 3074F SYST BP LT 130 MM HG: CPT | Performed by: INTERNAL MEDICINE

## 2023-05-12 PROCEDURE — 99214 OFFICE O/P EST MOD 30 MIN: CPT | Performed by: INTERNAL MEDICINE

## 2023-05-12 PROCEDURE — 3079F DIAST BP 80-89 MM HG: CPT | Performed by: INTERNAL MEDICINE

## 2023-05-12 NOTE — PROGRESS NOTES
Date of Office Visit: 2023  Encounter Provider: Anamika Lane MD  Place of Service: [facility] CARDIOLOG[facility]  Patient Name: Codie Munson  :1945      Patient ID:  Codie Munson is a 77 y.o. female is here for  followup for cardiomyopathy.        History of Present Illness    She has a history of cardiomyopathy, hypothyroidism, hyperlipidemia.    I originally saw her for complaints of dizziness, palpitations, and shortness of air in  . at that time she had a stress study which was abnormal with subsequent cardiac  catheterization at [facility] which showed no significant coronary artery disease.   Her left ventricular systolic function was normal. She however had an echocardiogram done  at the same time which showed an ejection fraction of 40%. Because of her lightheadedness  and dizziness, she had carotid duplex studies which showed minimal disease of the left  carotid artery. She then had repeat carotid duplex study performed on 2010  which showed no carotid artery disease.      She had a bladder resuspension with Dr. Harish Arellano.  She also has a pessary which was placed by Dr. Navarrete.  She has reflux of the lesser and greater saphenous veins. She sees Dr. Perrin for venous incompetence now and had vein stripping on the left.      Her echocardiogram which was done 2016, and this showed ejection fraction of 60% with grade I diastolic dysfunction, normal segmental wall motion and no significant valve disease.      Labs on 2023 show normal CMP, normal thyroid panel, total cholesterol 161, HDL 50, LDL 81, VLDL 30, triglycerides 172, normal CBC.  48-hour Holter monitor done 2022 showed 2 episodes of SVT lasting 4 beats each as well as occasional PVCs as couplets, bigeminy, trigeminy, PVCs occurring 1.75% the time.  Echo done 2022 showed ejection fraction 46/50% with septal hypokinesis, grade 1 diastolic dysfunction, sigmoid  septum, no significant valvular abnormalities.    She continues to work at [employer].  She has no chest pain, dyspnea, PND or orthopnea. She has no dizziness, palpitations.  She has a good energy level.  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  She really enjoys her work.  She also goes [employer] and rides on her [employer].  She has no headaches, vomiting, falls or syncope.    Past Medical History:   Diagnosis Date   • Cardiomyopathy    • Chronic cystitis    • Cough    • Depression    • GERD (gastroesophageal reflux disease)    • Hyperlipidemia    • Hypertension    • Scarlet fever    • Spinal headache    • Strain of thoracic region    • Urethral prolapse    • URI (upper respiratory infection)    • Urinary retention          Past Surgical History:   Procedure Laterality Date   • APPENDECTOMY     • BLADDER SURGERY     • CARDIAC CATHETERIZATION      Normal. Performed at [facility].   •  SECTION     • CHOLECYSTECTOMY     • CHOLECYSTECTOMY WITH INTRAOPERATIVE CHOLANGIOGRAM N/A 2022    Procedure: CHOLECYSTECTOMY LAPAROSCOPIC INTRAOPERATIVE CHOLANGIOGRAM;  Surgeon: Rl Ferro MD;  Location: [facility];  Service: General;  Laterality: N/A;   • COLONOSCOPY     • HYSTERECTOMY     • VEIN LIGATION AND STRIPPING         Current Outpatient Medications on File Prior to Visit   Medication Sig Dispense Refill   • carvedilol (COREG) 3.125 MG tablet Take 1 tablet by mouth 2 (Two) Times a Day. 60 tablet 11   • conjugated estrogens (PREMARIN) 0.625 MG/GM vaginal cream Insert 0.625 g into the vagina Every Night.      • esomeprazole (NexIUM) 40 MG capsule Take 1 capsule by mouth Every Morning Before Breakfast.     • fluocinonide (LIDEX) 0.05 % external solution      • fluticasone (FLONASE) 50 MCG/ACT nasal spray 2 sprays into the nostril(s) as directed by provider As Needed.     • levothyroxine (SYNTHROID,  "LEVOTHROID) 50 MCG tablet TAKE ONE TABLET BY MOUTH DAILY 30 tablet 6   • rosuvastatin (CRESTOR) 20 MG tablet TAKE ONE TABLET BY MOUTH ONCE NIGHTLY 90 tablet 0   • [DISCONTINUED] colestipol (COLESTID) 5 g granules Take 5 g by mouth Daily. Pt. Has not taken this. (Patient not taking: Reported on 5/12/2023)       No current facility-administered medications on file prior to visit.       Social History     Socioeconomic History   • Marital status:    Tobacco Use   • Smoking status: Never     Passive exposure: Never   • Smokeless tobacco: Never   • Tobacco comments:     drink caffiene daily   Vaping Use   • Vaping Use: Never used   Substance and Sexual Activity   • Alcohol use: No   • Drug use: Defer   • Sexual activity: Defer           ROS    Procedures    ECG 12 Lead    Date/Time: 5/12/2023 2:33 PM  Performed by: Anamika Lane MD  Authorized by: Anamika Lane MD   Comparison: compared with previous ECG   Similar to previous ECG  Rhythm: sinus rhythm  T flattening: all    Clinical impression: non-specific ECG                Objective:      Vitals:    05/12/23 1415   BP: 124/80   Pulse: 81   Weight: 66.2 kg (146 lb)   Height: 162.6 cm (64\")     Body mass index is 25.06 kg/m².    Vitals reviewed.   Constitutional:       General: Not in acute distress.     Appearance: Well-developed. Not diaphoretic.   Eyes:      General: No scleral icterus.     Conjunctiva/sclera: Conjunctivae normal.   HENT:      Head: Normocephalic and atraumatic.   Neck:      Thyroid: No thyromegaly.      Vascular: No carotid bruit or JVD.      Lymphadenopathy: No cervical adenopathy.   Pulmonary:      Effort: Pulmonary effort is normal. No respiratory distress.      Breath sounds: Normal breath sounds. No wheezing. No rhonchi. No rales.   Chest:      Chest wall: Not tender to palpatation.   Cardiovascular:      Normal rate. Regular rhythm.      Murmurs: There is no murmur.      No gallop.   Pulses:     Intact distal pulses. "   Edema:     Peripheral edema absent.   Abdominal:      General: Bowel sounds are normal. There is no distension or abdominal bruit.      Palpations: Abdomen is soft. There is no abdominal mass.      Tenderness: There is no abdominal tenderness.   Musculoskeletal:         General: No deformity.      Extremities: No clubbing present.     Cervical back: Neck supple. Skin:     General: Skin is warm and dry. There is no cyanosis.      Coloration: Skin is not pale.      Findings: No rash.   Neurological:      Mental Status: Alert and oriented to person, place, and time.      Cranial Nerves: No cranial nerve deficit.   Psychiatric:         Judgment: Judgment normal.         Lab Review:       Assessment:      Diagnosis Plan   1. Cardiomyopathy, unspecified type        2. Essential hypertension        3. Mixed hyperlipidemia          1. Hyperlipidemia, on Crestor.   2. Hypertension.  Stable on no meds  3. Normal renal arteriogram in 2002.   4. Nonischemic cardiomyopathy, mild-normal cardiac catheterization 2005.  5. Venous varicosities, s/p stripping on left 6/2019. Sees Dr. Perrin.       Plan:       See me in 1 year, ok to work, no changes, no testing.  Remain on crestor 20mg daily.

## 2023-05-25 RX ORDER — CARVEDILOL 3.12 MG/1
TABLET ORAL
Qty: 180 TABLET | Refills: 3 | Status: SHIPPED | OUTPATIENT
Start: 2023-05-25

## 2023-08-08 ENCOUNTER — HOSPITAL ENCOUNTER (OUTPATIENT)
Dept: MAMMOGRAPHY | Facility: HOSPITAL | Age: 78
Discharge: HOME | End: 2023-08-08
Admitting: PHYSICIAN ASSISTANT
Payer: MEDICARE

## 2023-08-08 DIAGNOSIS — Z12.31 SCREENING MAMMOGRAM, ENCOUNTER FOR: ICD-10-CM

## 2023-08-08 PROCEDURE — 77067 SCR MAMMO BI INCL CAD: CPT

## 2023-08-08 PROCEDURE — 77063 BREAST TOMOSYNTHESIS BI: CPT

## 2023-09-26 DIAGNOSIS — E03.9 ACQUIRED HYPOTHYROIDISM: ICD-10-CM

## 2023-09-27 RX ORDER — LEVOTHYROXINE SODIUM 0.05 MG/1
TABLET ORAL
Qty: 30 TABLET | Refills: 3 | Status: SHIPPED | OUTPATIENT
Start: 2023-09-27

## 2023-10-09 ENCOUNTER — OFFICE VISIT (OUTPATIENT)
Dept: FAMILY MEDICINE CLINIC | Facility: CLINIC | Age: 78
End: 2023-10-09
Payer: MEDICARE

## 2023-10-09 VITALS
DIASTOLIC BLOOD PRESSURE: 78 MMHG | HEIGHT: 64 IN | BODY MASS INDEX: 25.44 KG/M2 | HEART RATE: 107 BPM | SYSTOLIC BLOOD PRESSURE: 130 MMHG | WEIGHT: 149 LBS | TEMPERATURE: 98 F | OXYGEN SATURATION: 98 % | RESPIRATION RATE: 14 BRPM

## 2023-10-09 DIAGNOSIS — N39.0 URINARY TRACT INFECTION WITHOUT HEMATURIA, SITE UNSPECIFIED: ICD-10-CM

## 2023-10-09 DIAGNOSIS — R10.30 LOWER ABDOMINAL PAIN: Primary | ICD-10-CM

## 2023-10-09 LAB
BILIRUB BLD-MCNC: NEGATIVE MG/DL
CLARITY, POC: ABNORMAL
COLOR UR: ABNORMAL
GLUCOSE UR STRIP-MCNC: NEGATIVE MG/DL
KETONES UR QL: NEGATIVE
LEUKOCYTE EST, POC: ABNORMAL
NITRITE UR-MCNC: NEGATIVE MG/ML
PH UR: 6 [PH] (ref 5–8)
PROT UR STRIP-MCNC: NEGATIVE MG/DL
RBC # UR STRIP: ABNORMAL /UL
SP GR UR: 1.01 (ref 1–1.03)
UROBILINOGEN UR QL: NORMAL

## 2023-10-09 RX ORDER — NITROFURANTOIN 25; 75 MG/1; MG/1
100 CAPSULE ORAL 2 TIMES DAILY
Qty: 14 CAPSULE | Refills: 0 | Status: SHIPPED | OUTPATIENT
Start: 2023-10-09

## 2023-10-09 NOTE — PROGRESS NOTES
"Chief Complaint  Abdominal Pain (Painful after urination - UC treated with macrobid 9/26 )    Subjective          Codie Munson presents to [facility]  History of Present Illness    Codie is a 78 year old female who presents with lower abdominal pain with painful urination.  She was treated at the urgent care on September 26, 2023 for UTI on Macrobid.  She was told she had Klebsiella.  States she has a pastry that she uses to help with bladder issues.  States she was not changing very often.  She has now started changing every 3 months as of August 2023.  Unsure if she is wiping the correct way.  She has had urinary frequency, urgency and hesitancy.  She has not noticed any hematuria or dysuria.  Had some fevers over the weekend but that has since improved.  She tries to drink approximately 48 ounces of water daily.  Does drink some juice and coffee.  Sleep has been normal.     Objective   Vital Signs:   /78 (BP Location: Left arm, Patient Position: Sitting, Cuff Size: Adult)   Pulse 107   Temp 98 øF (36.7 øC)   Resp 14   Ht 162.6 cm (64\")   Wt 67.6 kg (149 lb)   SpO2 98%   BMI 25.58 kg/mý     Physical Exam  Vitals and nursing note reviewed.   Constitutional:       Appearance: Normal appearance. She is well-developed, well-groomed and overweight.   HENT:      Head: Normocephalic and atraumatic.   Neck:      Trachea: Trachea and phonation normal. No tracheal tenderness.   Cardiovascular:      Rate and Rhythm: Normal rate and regular rhythm.      Pulses: Normal pulses.      Heart sounds: Normal heart sounds, S1 normal and S2 normal. No murmur heard.  Pulmonary:      Effort: Pulmonary effort is normal.      Breath sounds: Normal breath sounds and air entry.   Abdominal:      General: Bowel sounds are normal.      Palpations: Abdomen is soft. There is no hepatomegaly.      Tenderness: There is no abdominal tenderness. There is no right CVA tenderness, left CVA tenderness, " guarding or rebound. Negative signs include Martin's sign, Rovsing's sign, McBurney's sign, psoas sign and obturator sign.   Musculoskeletal:      Cervical back: Neck supple.      Right lower leg: No edema.      Left lower leg: No edema.   Skin:     General: Skin is warm and dry.      Capillary Refill: Capillary refill takes less than 2 seconds.   Neurological:      Mental Status: She is alert and oriented to person, place, and time.   Psychiatric:         Attention and Perception: Attention and perception normal.         Mood and Affect: Mood and affect normal.         Speech: Speech normal.         Behavior: Behavior normal. Behavior is cooperative.         Thought Content: Thought content normal.         Cognition and Memory: Cognition and memory normal.         Judgment: Judgment normal.        Result Review :       Results for orders placed or performed in visit on 10/09/23   POCT urinalysis dipstick, manual    Specimen: Urine   Result Value Ref Range    Color Straw Yellow, Straw, Dark Yellow, Malena    Clarity, UA Cloudy (A) Clear    Glucose, UA Negative Negative mg/dL    Bilirubin Negative Negative    Ketones, UA Negative Negative    Specific Gravity  1.015 1.005 - 1.030    Blood, UA Moderate (A) Negative    pH, Urine 6.0 5.0 - 8.0    Protein, POC Negative Negative mg/dL    Urobilinogen, UA Normal Normal, 0.2 E.U./dL    Leukocytes Large (3+) (A) Negative    Nitrite, UA Negative Negative              UC with Donna Martin APRN (09/26/2023)    Urine Culture - Urine, Urine, Clean Catch (09/26/2023 18:01)     Assessment and Plan    Diagnoses and all orders for this visit:    1. Lower abdominal pain (Primary)  -     POCT urinalysis dipstick, manual  -     nitrofurantoin, macrocrystal-monohydrate, (Macrobid) 100 MG capsule; Take 1 capsule by mouth 2 (Two) Times a Day.  Dispense: 14 capsule; Refill: 0    2. Urinary tract infection without hematuria, site unspecified  -     Urine Culture - Urine, Urine, Clean  Catch  -     nitrofurantoin, macrocrystal-monohydrate, (Macrobid) 100 MG capsule; Take 1 capsule by mouth 2 (Two) Times a Day.  Dispense: 14 capsule; Refill: 0    Codie was seen in the office today with lower abdominal pain with urinary symptoms.  In office urinalysis was positive for white cells and red cells.  I have reviewed her urgent care note from September 26, 2023 with her.  We will send urine culture to the laboratory for further evaluation.  Diagnosed with UTI and placed on Macrobid.  She will be notified of test results and any medication changes.  She will increase fluid intake.  We have discussed changing her pessary every 3 months and wiping correctly.    I spent 19 minutes caring for Codie on this date of service. This time includes time spent by me in the following activities:preparing for the visit, reviewing tests, obtaining and/or reviewing a separately obtained history, performing a medically appropriate examination and/or evaluation , counseling and educating the patient/family/caregiver, ordering medications, tests, or procedures, and documenting information in the medical record  Follow Up   Return if symptoms worsen or fail to improve.  Patient was given instructions and counseling regarding her condition or for health maintenance advice. Please see specific information pulled into the AVS if appropriate.     MP C[facility]  [address] [place] [id number]  Dept: [phone]  Dept Fax: [phone]  Loc: [phone]  Loc Fax: [phone]

## 2023-10-16 LAB
BACTERIA UR CULT: ABNORMAL
BACTERIA UR CULT: ABNORMAL
OTHER ANTIBIOTIC SUSC ISLT: ABNORMAL

## 2023-10-17 DIAGNOSIS — N39.0 URINARY TRACT INFECTION WITHOUT HEMATURIA, SITE UNSPECIFIED: Primary | ICD-10-CM

## 2023-10-17 RX ORDER — CIPROFLOXACIN 500 MG/1
500 TABLET, FILM COATED ORAL 2 TIMES DAILY
Qty: 20 TABLET | Refills: 0 | Status: SHIPPED | OUTPATIENT
Start: 2023-10-17

## 2023-11-07 ENCOUNTER — TELEPHONE (OUTPATIENT)
Dept: FAMILY MEDICINE CLINIC | Facility: CLINIC | Age: 78
End: 2023-11-07

## 2023-11-07 DIAGNOSIS — E03.9 ACQUIRED HYPOTHYROIDISM: Primary | ICD-10-CM

## 2023-11-07 DIAGNOSIS — I10 ESSENTIAL HYPERTENSION: ICD-10-CM

## 2023-11-07 DIAGNOSIS — E78.2 MIXED HYPERLIPIDEMIA: ICD-10-CM

## 2023-11-07 NOTE — TELEPHONE ENCOUNTER
Caller: Codie Munson    Relationship: Self    Best call back number: [phone]    What orders are you requesting (i.e. lab or imaging):   ANNUAL WELLNESS VISIT LABS    In what timeframe would the patient need to come in: ASAP    Where will you receive your lab/imaging services: [facility]    Additional notes:     HAS AWV ON 11.13.23.    PLEASE CALL TO CONFIRM SO SHE CAN GET THESE DONE IN TIME BEFORE HER APPT.

## 2023-11-09 ENCOUNTER — LAB (OUTPATIENT)
Dept: LAB | Facility: HOSPITAL | Age: 78
End: 2023-11-09
Payer: MEDICARE

## 2023-11-09 PROCEDURE — 84443 ASSAY THYROID STIM HORMONE: CPT | Performed by: PHYSICIAN ASSISTANT

## 2023-11-09 PROCEDURE — 85025 COMPLETE CBC W/AUTO DIFF WBC: CPT | Performed by: PHYSICIAN ASSISTANT

## 2023-11-09 PROCEDURE — 80053 COMPREHEN METABOLIC PANEL: CPT | Performed by: PHYSICIAN ASSISTANT

## 2023-11-09 PROCEDURE — 80061 LIPID PANEL: CPT | Performed by: PHYSICIAN ASSISTANT

## 2023-11-13 ENCOUNTER — OFFICE VISIT (OUTPATIENT)
Dept: FAMILY MEDICINE CLINIC | Facility: CLINIC | Age: 78
End: 2023-11-13
Payer: MEDICARE

## 2023-11-13 VITALS
RESPIRATION RATE: 15 BRPM | SYSTOLIC BLOOD PRESSURE: 128 MMHG | HEART RATE: 91 BPM | WEIGHT: 149.3 LBS | HEIGHT: 64 IN | TEMPERATURE: 97.8 F | BODY MASS INDEX: 25.49 KG/M2 | DIASTOLIC BLOOD PRESSURE: 82 MMHG | OXYGEN SATURATION: 99 %

## 2023-11-13 DIAGNOSIS — I10 ESSENTIAL HYPERTENSION: ICD-10-CM

## 2023-11-13 DIAGNOSIS — F51.01 PRIMARY INSOMNIA: ICD-10-CM

## 2023-11-13 DIAGNOSIS — Z00.00 MEDICARE ANNUAL WELLNESS VISIT, SUBSEQUENT: Primary | ICD-10-CM

## 2023-11-13 DIAGNOSIS — E78.2 MIXED HYPERLIPIDEMIA: ICD-10-CM

## 2023-11-13 DIAGNOSIS — E03.9 ACQUIRED HYPOTHYROIDISM: ICD-10-CM

## 2023-11-13 NOTE — PROGRESS NOTES
The ABCs of the Annual Wellness Visit  Subsequent Medicare Wellness Visit    Chief Complaint   Patient presents with    Annual Exam     AWV with fasting labs    Hypertension     management    Hyperlipidemia     management    Hypothyroidism     management       Subjective      Codie Munson is a 78 y.o. female who presents for a Subsequent Medicare Wellness Visit,hypertension,hyperlipidemia and hypothyroidism management.  Codie states overall she has been feeling good.  Her sleep has been a little bit decreased due to having hot flashes and always thinking about things.  She started taking some melatonin which has helped.  Bowel movements have been normal without dark black tarry stools.  Her diet has been slightly healthy.  Tends to been eating more rice and pasta.  Exercise is work-related.  Has been compliant with medications.  Has seen her cardiologist this year for her hypertension.  Denied any current fevers, chills, upper respiratory symptoms, chest pain, shortness of air, cough, wheezing, abdominal pain, GI upset or swelling of ankles.    The following portions of the patient's history were reviewed and   updated as appropriate: She  has a past medical history of Cardiomyopathy, Chronic cystitis, Cough, Depression, GERD (gastroesophageal reflux disease), Hyperlipidemia, Hypertension, Scarlet fever, Spinal headache, Strain of thoracic region, Urethral prolapse, URI (upper respiratory infection), and Urinary retention.  She does not have any pertinent problems on file.  She  has a past surgical history that includes Appendectomy;  section; Bladder surgery; Cardiac catheterization; Vein ligation and stripping (); Hysterectomy; Colonoscopy; cholecystectomy with intraoperative cholangiogram (N/A, 2022); and Cholecystectomy.  Her family history includes Aneurysm in her father; Cancer in her brother and mother; Crohn's disease in her sister; Heart disease in her father; Heart failure in  her mother; Kidney failure in her mother; Prostate cancer in her brother; Stroke in her father.  She  reports that she has never smoked. She has never been exposed to tobacco smoke. She has never used smokeless tobacco. Drug use questions deferred to the physician. She reports that she does not drink alcohol.  Current Outpatient Medications   Medication Sig Dispense Refill    carvedilol (COREG) 3.125 MG tablet TAKE ONE TABLET BY MOUTH TWICE A  tablet 3    conjugated estrogens (PREMARIN) 0.625 MG/GM vaginal cream Insert 0.625 g into the vagina Every Night. Monday Wednesday friday      cyclobenzaprine (FLEXERIL) 5 MG tablet Take 1 tablet by mouth 2 (Two) Times a Day As Needed for Muscle Spasms. 30 tablet 0    esomeprazole (NexIUM) 40 MG capsule Take 1 capsule by mouth Every Morning Before Breakfast.      fluocinonide (LIDEX) 0.05 % external solution       fluticasone (FLONASE) 50 MCG/ACT nasal spray 2 sprays into the nostril(s) as directed by provider As Needed.      levothyroxine (SYNTHROID, LEVOTHROID) 50 MCG tablet TAKE ONE TABLET BY MOUTH DAILY 30 tablet 3    rosuvastatin (CRESTOR) 20 MG tablet TAKE ONE TABLET BY MOUTH ONCE NIGHTLY 90 tablet 0     No current facility-administered medications for this visit.     Current Outpatient Medications on File Prior to Visit   Medication Sig    carvedilol (COREG) 3.125 MG tablet TAKE ONE TABLET BY MOUTH TWICE A DAY    conjugated estrogens (PREMARIN) 0.625 MG/GM vaginal cream Insert 0.625 g into the vagina Every Night. Monday Wednesday friday    cyclobenzaprine (FLEXERIL) 5 MG tablet Take 1 tablet by mouth 2 (Two) Times a Day As Needed for Muscle Spasms.    esomeprazole (NexIUM) 40 MG capsule Take 1 capsule by mouth Every Morning Before Breakfast.    fluocinonide (LIDEX) 0.05 % external solution     fluticasone (FLONASE) 50 MCG/ACT nasal spray 2 sprays into the nostril(s) as directed by provider As Needed.    levothyroxine (SYNTHROID, LEVOTHROID) 50 MCG tablet TAKE ONE  TABLET BY MOUTH DAILY    rosuvastatin (CRESTOR) 20 MG tablet TAKE ONE TABLET BY MOUTH ONCE NIGHTLY    [DISCONTINUED] ciprofloxacin (Cipro) 500 MG tablet Take 1 tablet by mouth 2 (Two) Times a Day.     No current facility-administered medications on file prior to visit.     She is allergic to augmentin [amoxicillin-pot clavulanate] and biaxin [clarithromycin]..    Compared to one year ago, the patient feels her physical   health is the same.    Compared to one year ago, the patient feels her mental   health is the same.    Recent Hospitalizations:  She was not admitted to the hospital during the last year.       Current Medical Providers:  Patient Care Team:  Sri Leach PA-C as PCP - General (Family Medicine)    Outpatient Medications Prior to Visit   Medication Sig Dispense Refill    carvedilol (COREG) 3.125 MG tablet TAKE ONE TABLET BY MOUTH TWICE A  tablet 3    conjugated estrogens (PREMARIN) 0.625 MG/GM vaginal cream Insert 0.625 g into the vagina Every Night. Monday Wednesday friday      cyclobenzaprine (FLEXERIL) 5 MG tablet Take 1 tablet by mouth 2 (Two) Times a Day As Needed for Muscle Spasms. 30 tablet 0    esomeprazole (NexIUM) 40 MG capsule Take 1 capsule by mouth Every Morning Before Breakfast.      fluocinonide (LIDEX) 0.05 % external solution       fluticasone (FLONASE) 50 MCG/ACT nasal spray 2 sprays into the nostril(s) as directed by provider As Needed.      levothyroxine (SYNTHROID, LEVOTHROID) 50 MCG tablet TAKE ONE TABLET BY MOUTH DAILY 30 tablet 3    rosuvastatin (CRESTOR) 20 MG tablet TAKE ONE TABLET BY MOUTH ONCE NIGHTLY 90 tablet 0    ciprofloxacin (Cipro) 500 MG tablet Take 1 tablet by mouth 2 (Two) Times a Day. 20 tablet 0     No facility-administered medications prior to visit.       No opioid medication identified on active medication list. I have reviewed chart for other potential  high risk medication/s and harmful drug interactions in the elderly.        Aspirin is not on  "active medication list.  Aspirin use is not indicated based on review of current medical condition/s. Risk of harm outweighs potential benefits.  .    Patient Active Problem List   Diagnosis    Myocardiopathy    Bladder infection, chronic    Blues    Gastro-esophageal reflux disease without esophagitis    HLD (hyperlipidemia)    Prolapse of urethra    Incomplete bladder emptying    Gastroesophageal reflux disease with hiatal hernia    Erosive gastritis    Diverticulosis of large intestine without hemorrhage    Chronic UTI    Foot sprain    Medicare annual wellness visit, subsequent    Screening mammogram, encounter for    Lower abdominal pain    Hematuria    Left knee pain    Chondromalacia of patellofemoral joint, left    Acute left ankle pain    Varicose veins of both lower extremities    Pain and swelling of left lower leg    Postmenopausal    Hiatal hernia    Diverticulitis of colon    Acquired hypothyroidism    PVC (premature ventricular contraction)    Cardiomyopathy    Essential hypertension    Acute on chronic combined systolic and diastolic CHF (congestive heart failure)    Hypothyroidism due to acquired atrophy of thyroid     Advance Care Planning   Advance Care Planning     Advance Directive is not on file.  ACP discussion was held with the patient during this visit. Patient does not have an advance directive, declines further assistance.     Objective    Vitals:    11/13/23 1443   BP: 128/82   BP Location: Left arm   Patient Position: Sitting   Cuff Size: Adult   Pulse: 91   Resp: 15   Temp: 97.8 °F (36.6 °C)   SpO2: 99%   Weight: 67.7 kg (149 lb 4.8 oz)   Height: 162.6 cm (64.02\")     Estimated body mass index is 25.61 kg/m² as calculated from the following:    Height as of this encounter: 162.6 cm (64.02\").    Weight as of this encounter: 67.7 kg (149 lb 4.8 oz).    BMI is >= 25 and <30. (Overweight) The following options were offered after discussion;: weight loss educational material (shared in after " visit summary), exercise counseling/recommendations, and information on healthy weight added to patient's after visit summary       Does the patient have evidence of cognitive impairment?   No    Lab Results   Component Value Date    TRIG 193 (H) 11/09/2023    HDL 46 11/09/2023    LDL 97 11/09/2023    VLDL 33 11/09/2023       Results Encounter on 11/12/2023   Component Date Value Ref Range Status    Glucose 11/09/2023 99  65 - 99 mg/dL Final    BUN 11/09/2023 12  8 - 23 mg/dL Final    Creatinine 11/09/2023 0.89  0.57 - 1.00 mg/dL Final    Sodium 11/09/2023 142  136 - 145 mmol/L Final    Potassium 11/09/2023 4.4  3.5 - 5.2 mmol/L Final    Chloride 11/09/2023 103  98 - 107 mmol/L Final    CO2 11/09/2023 29.4 (H)  22.0 - 29.0 mmol/L Final    Calcium 11/09/2023 9.7  8.6 - 10.5 mg/dL Final    Total Protein 11/09/2023 6.9  6.0 - 8.5 g/dL Final    Albumin 11/09/2023 4.6  3.5 - 5.2 g/dL Final    ALT (SGPT) 11/09/2023 36 (H)  1 - 33 U/L Final    AST (SGOT) 11/09/2023 27  1 - 32 U/L Final    Alkaline Phosphatase 11/09/2023 129 (H)  39 - 117 U/L Final    Total Bilirubin 11/09/2023 0.4  0.0 - 1.2 mg/dL Final    Globulin 11/09/2023 2.3  gm/dL Final    A/G Ratio 11/09/2023 2.0  g/dL Final    BUN/Creatinine Ratio 11/09/2023 13.5  7.0 - 25.0 Final    Anion Gap 11/09/2023 9.6  5.0 - 15.0 mmol/L Final    eGFR 11/09/2023 65.6  >60.0 mL/min/1.73 Final    TSH 11/09/2023 1.560  0.270 - 4.200 uIU/mL Final    Total Cholesterol 11/09/2023 176  0 - 200 mg/dL Final    Triglycerides 11/09/2023 193 (H)  0 - 150 mg/dL Final    HDL Cholesterol 11/09/2023 46  40 - 60 mg/dL Final    LDL Cholesterol  11/09/2023 97  0 - 100 mg/dL Final    VLDL Cholesterol 11/09/2023 33  5 - 40 mg/dL Final    LDL/HDL Ratio 11/09/2023 1.99   Final    WBC 11/09/2023 6.18  3.40 - 10.80 10*3/mm3 Final    RBC 11/09/2023 4.85  3.77 - 5.28 10*6/mm3 Final    Hemoglobin 11/09/2023 13.4  12.0 - 15.9 g/dL Final    Hematocrit 11/09/2023 41.8  34.0 - 46.6 % Final    MCV 11/09/2023  86.2  79.0 - 97.0 fL Final    MCH 2023 27.6  26.6 - 33.0 pg Final    MCHC 2023 32.1  31.5 - 35.7 g/dL Final    RDW 2023 13.4  12.3 - 15.4 % Final    RDW-SD 2023 42.1  37.0 - 54.0 fl Final    MPV 2023 10.2  6.0 - 12.0 fL Final    Platelets 2023 330  140 - 450 10*3/mm3 Final    Neutrophil % 2023 53.8  42.7 - 76.0 % Final    Lymphocyte % 2023 33.3  19.6 - 45.3 % Final    Monocyte % 2023 6.5  5.0 - 12.0 % Final    Eosinophil % 2023 5.0  0.3 - 6.2 % Final    Basophil % 2023 1.1  0.0 - 1.5 % Final    Immature Grans % 2023 0.3  0.0 - 0.5 % Final    Neutrophils, Absolute 2023 3.32  1.70 - 7.00 10*3/mm3 Final    Lymphocytes, Absolute 2023 2.06  0.70 - 3.10 10*3/mm3 Final    Monocytes, Absolute 2023 0.40  0.10 - 0.90 10*3/mm3 Final    Eosinophils, Absolute 2023 0.31  0.00 - 0.40 10*3/mm3 Final    Basophils, Absolute 2023 0.07  0.00 - 0.20 10*3/mm3 Final    Immature Grans, Absolute 2023 0.02  0.00 - 0.05 10*3/mm3 Final    nRBC 2023 0.0  0.0 - 0.2 /100 WBC Final            HEALTH RISK ASSESSMENT    Smoking Status:  Social History     Tobacco Use   Smoking Status Never    Passive exposure: Never   Smokeless Tobacco Never   Tobacco Comments    drink caffiene daily     Alcohol Consumption:  Social History     Substance and Sexual Activity   Alcohol Use No     Fall Risk Screen:    STEADI Fall Risk Assessment was completed, and patient is at MODERATE risk for falls. Assessment completed on:2023    Depression Screenin/13/2023     2:50 PM   PHQ-2/PHQ-9 Depression Screening   Little Interest or Pleasure in Doing Things 0-->not at all   Feeling Down, Depressed or Hopeless 0-->not at all   PHQ-9: Brief Depression Severity Measure Score 0       Health Habits and Functional and Cognitive Screenin/13/2023     2:49 PM   Functional & Cognitive Status   Do you have difficulty preparing food and eating? No    Do you have difficulty bathing yourself, getting dressed or grooming yourself? No   Do you have difficulty using the toilet? No   Do you have difficulty moving around from place to place? No   Do you have trouble with steps or getting out of a bed or a chair? No   Current Diet Well Balanced Diet   Dental Exam Up to date   Eye Exam Up to date   Exercise (times per week) 5 times per week   Current Exercises Include Other   Do you need help using the phone?  No   Are you deaf or do you have serious difficulty hearing?  No   Do you need help to go to places out of walking distance? No   Do you need help shopping? No   Do you need help preparing meals?  No   Do you need help with housework?  No   Do you need help with laundry? No   Do you need help taking your medications? No   Do you need help managing money? No   Do you ever drive or ride in a car without wearing a seat belt? No       Age-appropriate Screening Schedule:  Refer to the list below for future screening recommendations based on patient's age, sex and/or medical conditions. Orders for these recommended tests are listed in the plan section. The patient has been provided with a written plan.    Health Maintenance   Topic Date Due    ANNUAL WELLNESS VISIT  11/10/2023    COVID-19 Vaccine (5 - 2023-24 season) 12/29/2023 (Originally 9/1/2023)    DXA SCAN  11/15/2023    LIPID PANEL  11/09/2024    BMI FOLLOWUP  11/13/2024    INFLUENZA VACCINE  Completed    Pneumococcal Vaccine 65+  Completed    TDAP/TD VACCINES  Discontinued    ZOSTER VACCINE  Discontinued    COLORECTAL CANCER SCREENING  Discontinued                Physical Exam  Vitals and nursing note reviewed.   Constitutional:       Appearance: Normal appearance. She is well-developed, well-groomed and overweight.   HENT:      Head: Normocephalic and atraumatic.      Jaw: There is normal jaw occlusion.      Right Ear: Hearing, tympanic membrane, ear canal and external ear normal.      Left Ear: Hearing,  tympanic membrane, ear canal and external ear normal.      Nose: Nose normal.      Right Sinus: No maxillary sinus tenderness or frontal sinus tenderness.      Left Sinus: No maxillary sinus tenderness or frontal sinus tenderness.      Mouth/Throat:      Lips: Pink.      Mouth: Mucous membranes are moist.      Dentition: Normal dentition.      Tongue: No lesions.      Pharynx: Oropharynx is clear. Uvula midline.      Tonsils: No tonsillar exudate.   Eyes:      General: Lids are normal.      Conjunctiva/sclera: Conjunctivae normal.      Pupils: Pupils are equal, round, and reactive to light.   Neck:      Thyroid: No thyroid mass, thyromegaly or thyroid tenderness.      Vascular: No carotid bruit.      Trachea: Trachea and phonation normal. No tracheal tenderness.   Cardiovascular:      Rate and Rhythm: Normal rate and regular rhythm.      Pulses: Normal pulses.      Heart sounds: Normal heart sounds, S1 normal and S2 normal. No murmur heard.  Pulmonary:      Effort: Pulmonary effort is normal.      Breath sounds: Normal breath sounds and air entry.   Abdominal:      General: Bowel sounds are normal.      Palpations: Abdomen is soft.      Tenderness: There is no abdominal tenderness. There is no right CVA tenderness, left CVA tenderness, guarding or rebound. Negative signs include Martin's sign, Rovsing's sign, McBurney's sign, psoas sign and obturator sign.   Musculoskeletal:      Cervical back: Neck supple.      Right lower leg: No edema.      Left lower leg: No edema.   Lymphadenopathy:      Cervical: No cervical adenopathy.      Right cervical: No superficial, deep or posterior cervical adenopathy.     Left cervical: No superficial, deep or posterior cervical adenopathy.   Skin:     General: Skin is warm and dry.      Capillary Refill: Capillary refill takes less than 2 seconds.   Neurological:      Mental Status: She is alert and oriented to person, place, and time.      Deep Tendon Reflexes:      Reflex  Scores:       Patellar reflexes are 2+ on the right side and 2+ on the left side.  Psychiatric:         Attention and Perception: Attention and perception normal.         Mood and Affect: Mood and affect normal.         Speech: Speech normal.         Behavior: Behavior is cooperative.         Thought Content: Thought content normal.         Cognition and Memory: Cognition and memory normal.         Judgment: Judgment normal.           Office Visit with Anamika Lane MD (05/12/2023)  CMS Preventative Services Quick Reference  Risk Factors Identified During Encounter:    Hypercholesterolemia and Hypertension    The above risks/problems have been discussed with the patient.  Pertinent information has been shared with the patient in the After Visit Summary.    Diagnoses and all orders for this visit:    1. Medicare annual wellness visit, subsequent (Primary)    2. Essential hypertension    3. Mixed hyperlipidemia    4. Acquired hypothyroidism    5. Primary insomnia     Annual medicare wellness exam with Hyperlipidemia management: I have reviewed above blood work with her today.  Triglycerides were slightly elevated.  Please decrease sugar and carbohydrates in diet.  Plan to repeat fasting lab work in 6 months at [facility].  Chronic and stable hypertension: Have rechecked blood pressure today and got 120/78 in left arm.  She will continue her current labetalol medication at home.  Keep follow-up appointments with cardiologist.  Chronic and stable hypothyroidism: TSH was in therapeutic range.  Continue current dose of thyroid medication.  New primary insomnia: She is seeing some improvement with melatonin.  She will continue this at home and update if no improvement.    Follow Up:   Next Medicare Wellness visit to be scheduled in 1 year.      An After Visit Summary and PPPS were made available to the patient.      Patient Counseling:  --Nutrition: Stressed importance of moderation in  sodium/caffeine intake, saturated fat and cholesterol.  Discussed caloric balance, sufficient intake of fresh fruits, vegetables, fiber,   calcium, iron.  --Discussed the new recommendation against daily use of baby aspirin for primary prevention in low risk patients.  --Exercise: Stressed the importance of regular exercise by incorporating into daily routine.    --Substance Abuse: Discussed cessation/primary prevention of tobacco, alcohol, or other drug use; driving or other dangerous activities under the influence.    --Dental health: Discussed importance of regular tooth brushing, flossing, and dental visits.  -- Suggested having eyes and vision checked if needed or past due.  --Immunizations reviewed.  --Discussed benefits of screening colonoscopy.      [facility]  [address] [place] [id number]  Dept: [phone]  Dept Fax: [phone]  Loc: [phone]  Loc Fax: [phone]

## 2024-01-26 ENCOUNTER — TELEPHONE (OUTPATIENT)
Dept: PEDIATRICS | Facility: OTHER | Age: 79
End: 2024-01-26

## 2024-01-26 DIAGNOSIS — E78.2 MIXED HYPERLIPIDEMIA: ICD-10-CM

## 2024-01-26 DIAGNOSIS — I10 ESSENTIAL HYPERTENSION: Primary | ICD-10-CM

## 2024-01-26 DIAGNOSIS — E03.9 ACQUIRED HYPOTHYROIDISM: ICD-10-CM

## 2024-01-26 DIAGNOSIS — Z00.00 ANNUAL PHYSICAL EXAM: ICD-10-CM

## 2024-01-26 NOTE — TELEPHONE ENCOUNTER
Caller: Codie Munson    Relationship: Self    Best call back number: [phone]  What orders are you requesting (i.e. lab or imaging): LABS    In what timeframe would the patient need to come in: BEFORE APPOINTMENT ON 05/23     Where will you receive your lab/imaging services: [facility]

## 2024-03-10 DIAGNOSIS — E03.9 ACQUIRED HYPOTHYROIDISM: ICD-10-CM

## 2024-03-10 RX ORDER — LEVOTHYROXINE SODIUM 0.05 MG/1
50 TABLET ORAL DAILY
Qty: 90 TABLET | Refills: 0 | Status: SHIPPED | OUTPATIENT
Start: 2024-03-10

## 2024-04-24 ENCOUNTER — APPOINTMENT (OUTPATIENT)
Dept: GENERAL RADIOLOGY | Facility: HOSPITAL | Age: 79
End: 2024-04-24
Payer: MEDICARE

## 2024-04-24 ENCOUNTER — HOSPITAL ENCOUNTER (EMERGENCY)
Facility: HOSPITAL | Age: 79
Discharge: HOME | End: 2024-04-24
Attending: EMERGENCY MEDICINE
Payer: MEDICARE

## 2024-04-24 VITALS
BODY MASS INDEX: 24.16 KG/M2 | WEIGHT: 145 LBS | HEART RATE: 80 BPM | HEIGHT: 65 IN | DIASTOLIC BLOOD PRESSURE: 82 MMHG | RESPIRATION RATE: 18 BRPM | SYSTOLIC BLOOD PRESSURE: 178 MMHG | TEMPERATURE: 98.5 F | OXYGEN SATURATION: 98 %

## 2024-04-24 DIAGNOSIS — M25.511 ACUTE PAIN OF RIGHT SHOULDER: Primary | ICD-10-CM

## 2024-04-24 PROCEDURE — 99283 EMERGENCY DEPT VISIT LOW MDM: CPT

## 2024-04-24 PROCEDURE — 73000 X-RAY EXAM OF COLLAR BONE: CPT

## 2024-04-24 PROCEDURE — 73030 X-RAY EXAM OF SHOULDER: CPT

## 2024-04-24 RX ORDER — TRAMADOL HYDROCHLORIDE 50 MG/1
50 TABLET ORAL ONCE
Status: COMPLETED | OUTPATIENT
Start: 2024-04-24 | End: 2024-04-24

## 2024-04-24 RX ORDER — CYCLOBENZAPRINE HCL 10 MG
10 TABLET ORAL 3 TIMES DAILY PRN
Qty: 30 TABLET | Refills: 0 | Status: SHIPPED | OUTPATIENT
Start: 2024-04-24

## 2024-04-24 RX ADMIN — TRAMADOL HYDROCHLORIDE 50 MG: 50 TABLET ORAL at 22:28

## 2024-04-25 NOTE — ED PROVIDER NOTES
Subjective   History of Present Illness    Chief complaint: Shoulder pain    Location: Right shoulder and clavicle    Quality/Severity: Moderate, sharp      Timing/Onset/Duration: 2 days ago    Modifying Factors: Hurts to move    Associated Symptoms: No numbness, tingling, or weakness.  No fever or chills.  No chest pain or shortness of breath.    Narrative: This 78-year-old white female presents with right shoulder pain and right clavicle pain.  Patient denies any trauma.    PCP: Patient states she does not have a PCP.      Review of Systems   Constitutional:  Negative for chills and fever.   HENT:  Negative for ear pain and sore throat.    Respiratory:  Negative for cough and shortness of breath.    Cardiovascular:  Negative for chest pain.   Gastrointestinal:  Negative for abdominal pain.   Musculoskeletal:  Negative for back pain and neck pain.   Skin:  Negative for rash.   Neurological:  Negative for headaches.         Past Medical History:   Diagnosis Date    Cardiomyopathy     Chronic cystitis     Cough     Depression     GERD (gastroesophageal reflux disease)     Hyperlipidemia     Hypertension     Scarlet fever     Spinal headache     Strain of thoracic region     Urethral prolapse     URI (upper respiratory infection)     Urinary retention        Allergies   Allergen Reactions    Augmentin [Amoxicillin-Pot Clavulanate] Rash    Biaxin [Clarithromycin] Rash       Past Surgical History:   Procedure Laterality Date    APPENDECTOMY      BLADDER SURGERY      CARDIAC CATHETERIZATION      Normal. Performed at [facility].     SECTION      CHOLECYSTECTOMY      CHOLECYSTECTOMY WITH INTRAOPERATIVE CHOLANGIOGRAM N/A 2022    Procedure: CHOLECYSTECTOMY LAPAROSCOPIC INTRAOPERATIVE CHOLANGIOGRAM;  Surgeon: Rl Ferro MD;  Location: [facility];  Service: General;  Laterality: N/A;    COLONOSCOPY      HYSTERECTOMY      VEIN LIGATION AND STRIPPING         Family History   Problem  Relation Age of Onset    Heart failure Mother     Cancer Mother         Bladder    Kidney failure Mother     Aneurysm Father     Heart disease Father         CABG    Stroke Father     Crohn's disease Sister     Prostate cancer Brother     Cancer Brother         bladder    Breast cancer Neg Hx        Social History     Socioeconomic History    Marital status:    Tobacco Use    Smoking status: Never     Passive exposure: Never    Smokeless tobacco: Never    Tobacco comments:     drink caffiene daily   Vaping Use    Vaping status: Never Used   Substance and Sexual Activity    Alcohol use: No    Drug use: Defer    Sexual activity: Defer           Objective   Physical Exam  Vitals (The temperature is 98.5 °F, pulse 80, respirations 18, /82, room air pulse ox 98%.) and nursing note reviewed.   Constitutional:       Appearance: Normal appearance.   Neck:      Comments: No tenderness, deformity, or bony step-offs upon palpation of the cervical, thoracic, lumbar sacrococcygeal spine.  Chest:      Chest wall: Tenderness (Right clavicle trapezius and shoulder) present.   Skin:     General: Skin is warm and dry.      Findings: No rash.   Neurological:      General: No focal deficit present.      Mental Status: She is alert and oriented to person, place, and time.      Sensory: No sensory deficit.      Motor: No weakness.         Procedures           ED Course      21:54 EDT, 04/24/24:  Patient's diagnosis of right shoulder pain was discussed with her.  The patient should ice the right shoulder for 20 minutes every 2-3 hours while she is awake for 2 3 days and then apply moist heat.  Patient should take Motrin or or Tylenol as needed as directed for pain.  The patient will be given a prescription for Flexeril.  The patient should call the patient connection line in the morning for a follow-up appointment within 1 week.  The patient should return to the emergency department if there is worsening pain, numbness,  tingling, weakness, worse in any way at all.  All the patient questions were answered she will be discharged in good condition.  Wear the sling as needed as directed for comfort                                       Medical Decision Making      Final diagnoses:   Acute pain of right shoulder       ED Disposition  ED Disposition       None            No follow-up provider specified.       Medication List      No changes were made to your prescriptions during this visit.            Edvin Paz MD  04/25/24 0002

## 2024-04-25 NOTE — DISCHARGE INSTRUCTIONS
Take the Flexeril as needed as prescribed.  Take Tylenol or Motrin as needed as directed as prescribed.  Ice the right shoulder for 20 minutes every 2-3 hours while awake for 2 to 3 days and then apply moist heat for 20 minutes every 2-3 hours while awake for 2 to 3 days.  Call the patient connection line in the morning for a primary care provider to follow-up with within 1 week.  Return to the emergency department if there is increased pain, shortness of breath, worse in any way at all.  Wear the sling as needed as directed for comfort.

## 2024-05-14 ENCOUNTER — LAB (OUTPATIENT)
Dept: LAB | Facility: HOSPITAL | Age: 79
End: 2024-05-14
Payer: MEDICARE

## 2024-05-14 DIAGNOSIS — I10 ESSENTIAL HYPERTENSION: ICD-10-CM

## 2024-05-14 DIAGNOSIS — E03.9 ACQUIRED HYPOTHYROIDISM: ICD-10-CM

## 2024-05-14 DIAGNOSIS — E78.2 MIXED HYPERLIPIDEMIA: ICD-10-CM

## 2024-05-14 DIAGNOSIS — Z00.00 ANNUAL PHYSICAL EXAM: ICD-10-CM

## 2024-05-14 LAB
ALBUMIN SERPL-MCNC: 4.2 G/DL (ref 3.5–5.2)
ALBUMIN/GLOB SERPL: 1.8 G/DL
ALP SERPL-CCNC: 106 U/L (ref 39–117)
ALT SERPL W P-5'-P-CCNC: 16 U/L (ref 1–33)
ANION GAP SERPL CALCULATED.3IONS-SCNC: 10.4 MMOL/L (ref 5–15)
AST SERPL-CCNC: 15 U/L (ref 1–32)
BASOPHILS # BLD AUTO: 0.04 10*3/MM3 (ref 0–0.2)
BASOPHILS NFR BLD AUTO: 0.7 % (ref 0–1.5)
BILIRUB SERPL-MCNC: 0.3 MG/DL (ref 0–1.2)
BUN SERPL-MCNC: 10 MG/DL (ref 8–23)
BUN/CREAT SERPL: 12.3 (ref 7–25)
CALCIUM SPEC-SCNC: 9 MG/DL (ref 8.6–10.5)
CHLORIDE SERPL-SCNC: 104 MMOL/L (ref 98–107)
CHOLEST SERPL-MCNC: 195 MG/DL (ref 0–200)
CO2 SERPL-SCNC: 26.6 MMOL/L (ref 22–29)
CREAT SERPL-MCNC: 0.81 MG/DL (ref 0.57–1)
DEPRECATED RDW RBC AUTO: 41.9 FL (ref 37–54)
EGFRCR SERPLBLD CKD-EPI 2021: 73.5 ML/MIN/1.73
EOSINOPHIL # BLD AUTO: 0.25 10*3/MM3 (ref 0–0.4)
EOSINOPHIL NFR BLD AUTO: 4.4 % (ref 0.3–6.2)
ERYTHROCYTE [DISTWIDTH] IN BLOOD BY AUTOMATED COUNT: 13.3 % (ref 12.3–15.4)
GLOBULIN UR ELPH-MCNC: 2.4 GM/DL
GLUCOSE SERPL-MCNC: 94 MG/DL (ref 65–99)
HCT VFR BLD AUTO: 40.1 % (ref 34–46.6)
HDLC SERPL-MCNC: 42 MG/DL (ref 40–60)
HGB BLD-MCNC: 13.3 G/DL (ref 12–15.9)
IMM GRANULOCYTES # BLD AUTO: 0.01 10*3/MM3 (ref 0–0.05)
IMM GRANULOCYTES NFR BLD AUTO: 0.2 % (ref 0–0.5)
LDLC SERPL CALC-MCNC: 121 MG/DL (ref 0–100)
LDLC/HDLC SERPL: 2.79 {RATIO}
LYMPHOCYTES # BLD AUTO: 2.15 10*3/MM3 (ref 0.7–3.1)
LYMPHOCYTES NFR BLD AUTO: 37.9 % (ref 19.6–45.3)
MCH RBC QN AUTO: 28.9 PG (ref 26.6–33)
MCHC RBC AUTO-ENTMCNC: 33.2 G/DL (ref 31.5–35.7)
MCV RBC AUTO: 87 FL (ref 79–97)
MONOCYTES # BLD AUTO: 0.38 10*3/MM3 (ref 0.1–0.9)
MONOCYTES NFR BLD AUTO: 6.7 % (ref 5–12)
NEUTROPHILS NFR BLD AUTO: 2.85 10*3/MM3 (ref 1.7–7)
NEUTROPHILS NFR BLD AUTO: 50.1 % (ref 42.7–76)
NRBC BLD AUTO-RTO: 0 /100 WBC (ref 0–0.2)
PLATELET # BLD AUTO: 325 10*3/MM3 (ref 140–450)
PMV BLD AUTO: 10 FL (ref 6–12)
POTASSIUM SERPL-SCNC: 3.9 MMOL/L (ref 3.5–5.2)
PROT SERPL-MCNC: 6.6 G/DL (ref 6–8.5)
RBC # BLD AUTO: 4.61 10*6/MM3 (ref 3.77–5.28)
SODIUM SERPL-SCNC: 141 MMOL/L (ref 136–145)
T-UPTAKE NFR SERPL: 1.05 TBI (ref 0.8–1.3)
T4 SERPL-MCNC: 8.67 MCG/DL (ref 4.5–11.7)
TRIGL SERPL-MCNC: 180 MG/DL (ref 0–150)
TSH SERPL DL<=0.05 MIU/L-ACNC: 1.6 UIU/ML (ref 0.27–4.2)
VLDLC SERPL-MCNC: 32 MG/DL (ref 5–40)
WBC NRBC COR # BLD AUTO: 5.68 10*3/MM3 (ref 3.4–10.8)

## 2024-05-14 PROCEDURE — 85025 COMPLETE CBC W/AUTO DIFF WBC: CPT

## 2024-05-14 PROCEDURE — 80053 COMPREHEN METABOLIC PANEL: CPT

## 2024-05-14 PROCEDURE — 84479 ASSAY OF THYROID (T3 OR T4): CPT

## 2024-05-14 PROCEDURE — 84436 ASSAY OF TOTAL THYROXINE: CPT

## 2024-05-14 PROCEDURE — 80061 LIPID PANEL: CPT

## 2024-05-14 PROCEDURE — 84443 ASSAY THYROID STIM HORMONE: CPT

## 2024-05-14 PROCEDURE — 36415 COLL VENOUS BLD VENIPUNCTURE: CPT

## 2024-05-17 ENCOUNTER — OFFICE VISIT (OUTPATIENT)
Dept: CARDIOLOGY | Facility: CLINIC | Age: 79
End: 2024-05-17
Payer: MEDICARE

## 2024-05-17 VITALS
HEIGHT: 65 IN | BODY MASS INDEX: 24.32 KG/M2 | DIASTOLIC BLOOD PRESSURE: 80 MMHG | SYSTOLIC BLOOD PRESSURE: 128 MMHG | HEART RATE: 68 BPM | WEIGHT: 146 LBS

## 2024-05-17 DIAGNOSIS — I49.3 PVC (PREMATURE VENTRICULAR CONTRACTION): ICD-10-CM

## 2024-05-17 DIAGNOSIS — I42.9 CARDIOMYOPATHY, UNSPECIFIED TYPE: Primary | ICD-10-CM

## 2024-05-17 DIAGNOSIS — I10 ESSENTIAL HYPERTENSION: ICD-10-CM

## 2024-05-17 DIAGNOSIS — S19.9XXD INJURY OF NECK, SUBSEQUENT ENCOUNTER: ICD-10-CM

## 2024-05-17 DIAGNOSIS — E78.2 MIXED HYPERLIPIDEMIA: ICD-10-CM

## 2024-05-17 RX ORDER — ROSUVASTATIN CALCIUM 20 MG/1
20 TABLET, COATED ORAL EVERY OTHER DAY
Qty: 45 TABLET | Refills: 3 | Status: SHIPPED | OUTPATIENT
Start: 2024-05-17

## 2024-05-17 NOTE — PROGRESS NOTES
Date of Office Visit: 2024  Encounter Provider: Anamika Lane MD  Place of Service: [facility]  Patient Name: Codie Munson  :1945      Patient ID:  Codie Munson is a 78 y.o. female is here for  followup for cardiomyopathy.         History of Present Illness    She has a history of cardiomyopathy, hypothyroidism, hyperlipidemia.     I originally saw her for complaints of dizziness, palpitations, and shortness of air in  . at that time she had a stress study which was abnormal with subsequent cardiac  catheterization at [facility] which showed no significant coronary artery disease.   Her left ventricular systolic function was normal. She however had an echocardiogram done  at the same time which showed an ejection fraction of 40%. Because of her lightheadedness  and dizziness, she had carotid duplex studies which showed minimal disease of the left  carotid artery. She then had repeat carotid duplex study performed on 2010  which showed no carotid artery disease.      She had a bladder resuspension with Dr. Harish Arellano.  She also has a pessary which was placed by Dr. Navarrete.  She has reflux of the lesser and greater saphenous veins. She sees Dr. Perrin for venous incompetence now and had vein stripping on the left.      Her echocardiogram which was done 2016, and this showed ejection fraction of 60% with grade I diastolic dysfunction, normal segmental wall motion and no significant valve disease.      48-hour Holter monitor done 2022 showed 2 episodes of SVT lasting 4 beats each as well as occasional PVCs as couplets, bigeminy, trigeminy, PVCs occurring 1.75% the time.  Echo done 2022 showed ejection fraction 46-50% with septal hypokinesis, grade 1 diastolic dysfunction, sigmoid septum, no significant valvular abnormalities.     She continues to work at [employer].  She recently had a squamous cell  carcinoma removed from the right side of her face.  She goes [place] and rides on her [place].      She has no chest pain, no dyspnea.  She stopped her rosuvastatin.  Has stopped rosuvastatin because she was concerned about dementia.  She then restarted twice because her lipids went up.  She is willing to take it every other day.  She has no orthopnea or PND.  She has no lower extremity edema.  She does have venous varicosities and venous insufficiency.  She hit her head on her bedroom wall really hard last July.  After this, she was mopping the floor and hit her head very hard on the  and then hit her head again while she was doing another house which were.  Her head feels okay and she has CT of the head which was negative but she has continued neck pain.  She does not feel her heart racing or skipping and she has had no dizziness or syncope.      Past Medical History:   Diagnosis Date    Cardiomyopathy     Chronic cystitis     Cough     Depression     GERD (gastroesophageal reflux disease)     Hyperlipidemia     Hypertension     Scarlet fever     Spinal headache     Strain of thoracic region     Urethral prolapse     URI (upper respiratory infection)     Urinary retention          Past Surgical History:   Procedure Laterality Date    APPENDECTOMY      BLADDER SURGERY      CARDIAC CATHETERIZATION      Normal. Performed at [facility].     SECTION      CHOLECYSTECTOMY      CHOLECYSTECTOMY WITH INTRAOPERATIVE CHOLANGIOGRAM N/A 2022    Procedure: CHOLECYSTECTOMY LAPAROSCOPIC INTRAOPERATIVE CHOLANGIOGRAM;  Surgeon: Rl Ferro MD;  Location: [facility];  Service: General;  Laterality: N/A;    COLONOSCOPY      HYSTERECTOMY      VEIN LIGATION AND STRIPPING         Current Outpatient Medications on File Prior to Visit   Medication Sig Dispense Refill    carvedilol (COREG) 3.125 MG tablet TAKE ONE TABLET BY MOUTH TWICE A  tablet 3    conjugated estrogens  "(PREMARIN) 0.625 MG/GM vaginal cream Insert 0.625 g into the vagina Every Night. Monday Wednesday friday      cyclobenzaprine (FLEXERIL) 10 MG tablet Take 1 tablet by mouth 3 (Three) Times a Day As Needed for Muscle Spasms. 30 tablet 0    esomeprazole (NexIUM) 40 MG capsule Take 1 capsule by mouth Every Morning Before Breakfast.      fluocinonide (LIDEX) 0.05 % external solution       fluticasone (FLONASE) 50 MCG/ACT nasal spray 2 sprays into the nostril(s) as directed by provider As Needed.      levothyroxine (SYNTHROID, LEVOTHROID) 50 MCG tablet TAKE 1 TABLET BY MOUTH DAILY 90 tablet 0    [DISCONTINUED] rosuvastatin (CRESTOR) 20 MG tablet TAKE ONE TABLET BY MOUTH ONCE NIGHTLY 90 tablet 0     No current facility-administered medications on file prior to visit.       Social History     Socioeconomic History    Marital status:    Tobacco Use    Smoking status: Never     Passive exposure: Never    Smokeless tobacco: Never    Tobacco comments:     drink caffiene daily   Vaping Use    Vaping status: Never Used   Substance and Sexual Activity    Alcohol use: No    Drug use: Defer    Sexual activity: Defer             Procedures    ECG 12 Lead    Date/Time: 5/17/2024 2:43 PM  Performed by: Anamika Lane MD    Authorized by: Anamika Lane MD  Comparison: compared with previous ECG   Similar to previous ECG  Rhythm: sinus rhythm    Clinical impression: normal ECG              Objective:      Vitals:    05/17/24 1430   BP: 128/80   Pulse: 68   Weight: 66.2 kg (146 lb)   Height: 165.1 cm (65\")     Body mass index is 24.3 kg/m².    Vitals reviewed.   Constitutional:       General: Not in acute distress.     Appearance: Not diaphoretic.   Neck:      Vascular: No carotid bruit or JVD.   Pulmonary:      Effort: Pulmonary effort is normal.      Breath sounds: Normal breath sounds.   Cardiovascular:      Normal rate. Regular rhythm.      Murmurs: There is no murmur.      No gallop.  No rub.   Pulses:     " Intact distal pulses.      Carotid: 2+ bilaterally.     Radial: 2+ bilaterally.     Dorsalis pedis: 2+ bilaterally.     Posterior tibial: 2+ bilaterally.  Edema:     Peripheral edema absent.   Neurological:      Cranial Nerves: No cranial nerve deficit.         Lab Review:       Assessment:      Diagnosis Plan   1. Cardiomyopathy, unspecified type  MRI cervical spine wo contrast      2. Essential hypertension        3. Mixed hyperlipidemia  MRI cervical spine wo contrast      4. PVC (premature ventricular contraction)        5. Injury of neck, subsequent encounter  MRI cervical spine wo contrast        Hyperlipidemia, on Crestor.  Take Crestor every other night  Hypertension.  Stable on no meds  Normal renal arteriogram in 2002.   Nonischemic cardiomyopathy, mild-normal cardiac catheterization 2005.  Venous varicosities, s/p stripping on left 6/2019. Sees Dr. Perrin.  Head injuries from hitting her head and also a neck injury with continued neck pain.  Set up MRI of the neck.  CT head was unremarkable.  Venous insufficiency, chronic     Plan:       See back in 1 year, take Crestor every other night.

## 2024-05-20 RX ORDER — CARVEDILOL 3.12 MG/1
3.12 TABLET ORAL 2 TIMES DAILY
Qty: 180 TABLET | Refills: 3 | Status: SHIPPED | OUTPATIENT
Start: 2024-05-20

## 2024-05-23 ENCOUNTER — OFFICE VISIT (OUTPATIENT)
Dept: FAMILY MEDICINE CLINIC | Facility: CLINIC | Age: 79
End: 2024-05-23
Payer: MEDICARE

## 2024-05-23 VITALS
SYSTOLIC BLOOD PRESSURE: 120 MMHG | TEMPERATURE: 97.1 F | RESPIRATION RATE: 15 BRPM | BODY MASS INDEX: 24.49 KG/M2 | HEART RATE: 84 BPM | DIASTOLIC BLOOD PRESSURE: 80 MMHG | WEIGHT: 147 LBS | OXYGEN SATURATION: 97 % | HEIGHT: 65 IN

## 2024-05-23 DIAGNOSIS — E03.9 ACQUIRED HYPOTHYROIDISM: ICD-10-CM

## 2024-05-23 DIAGNOSIS — E78.2 MIXED HYPERLIPIDEMIA: Primary | ICD-10-CM

## 2024-05-23 DIAGNOSIS — I10 ESSENTIAL HYPERTENSION: ICD-10-CM

## 2024-05-23 PROCEDURE — 1126F AMNT PAIN NOTED NONE PRSNT: CPT | Performed by: PHYSICIAN ASSISTANT

## 2024-05-23 PROCEDURE — 3079F DIAST BP 80-89 MM HG: CPT | Performed by: PHYSICIAN ASSISTANT

## 2024-05-23 PROCEDURE — 3074F SYST BP LT 130 MM HG: CPT | Performed by: PHYSICIAN ASSISTANT

## 2024-05-23 PROCEDURE — 99214 OFFICE O/P EST MOD 30 MIN: CPT | Performed by: PHYSICIAN ASSISTANT

## 2024-05-23 NOTE — PROGRESS NOTES
"Chief Complaint  Hypothyroidism (Management), Hypertension (Management), and Hyperlipidemia (Management)    Subjective          Codie Munson presents to [facility]  History of Present Illness  Codie is a 78 year old female who presents hypertension, hyperlipidemia and hypothyroidism management.  Codie's weight is currently stable.  She had seen her cardiologist recently.  Codie had stopped taking her Crestor medication due to thinking it might be causing some memory/dementia issues.  States she had a friend that had stopped taking her medication due to the this.  Since she saw the cardiologist, she has started taking the Crestor at least 3 times weekly.  Her diet has been normal.  Sleep has been good.  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  Codie denied any current chest pain, shortness of air, cough, wheezing, abdominal pain, GI upset, swelling of ankles or memory issues.  States she would like to hold off on DEXA scan at until she establish with new provider next month.     Objective   Vital Signs:   /80   Pulse 84   Temp 97.1 °F (36.2 °C)   Resp 15   Ht 165.1 cm (65\")   Wt 66.7 kg (147 lb)   SpO2 97%   BMI 24.46 kg/m²     Physical Exam  Vitals and nursing note reviewed.   Constitutional:       Appearance: Normal appearance. She is well-developed, well-groomed and normal weight.   HENT:      Head: Normocephalic and atraumatic.   Neck:      Thyroid: No thyroid mass, thyromegaly or thyroid tenderness.      Vascular: No carotid bruit.      Trachea: Trachea and phonation normal. No tracheal tenderness.   Cardiovascular:      Rate and Rhythm: Normal rate and regular rhythm.      Pulses: Normal pulses.      Heart sounds: Normal heart sounds, S1 normal and S2 normal. No murmur heard.  Pulmonary:      Effort: Pulmonary effort is normal.      Breath sounds: Normal breath sounds and air entry.   Abdominal:      General: Bowel sounds are " normal.      Palpations: Abdomen is soft. There is no hepatomegaly.      Tenderness: There is no abdominal tenderness. There is no right CVA tenderness, left CVA tenderness, guarding or rebound. Negative signs include Martin's sign, Rovsing's sign, McBurney's sign, psoas sign and obturator sign.   Musculoskeletal:      Cervical back: Neck supple.      Right lower leg: No edema.      Left lower leg: No edema.   Skin:     General: Skin is warm and dry.      Capillary Refill: Capillary refill takes less than 2 seconds.   Neurological:      Mental Status: She is alert and oriented to person, place, and time.   Psychiatric:         Attention and Perception: Attention and perception normal.         Mood and Affect: Mood and affect normal.         Speech: Speech normal.         Behavior: Behavior normal. Behavior is cooperative.         Thought Content: Thought content normal.         Cognition and Memory: Cognition and memory normal.         Judgment: Judgment normal.        Result Review :         Lab on 05/14/2024   Component Date Value Ref Range Status    Glucose 05/14/2024 94  65 - 99 mg/dL Final    BUN 05/14/2024 10  8 - 23 mg/dL Final    Creatinine 05/14/2024 0.81  0.57 - 1.00 mg/dL Final    Sodium 05/14/2024 141  136 - 145 mmol/L Final    Potassium 05/14/2024 3.9  3.5 - 5.2 mmol/L Final    Chloride 05/14/2024 104  98 - 107 mmol/L Final    CO2 05/14/2024 26.6  22.0 - 29.0 mmol/L Final    Calcium 05/14/2024 9.0  8.6 - 10.5 mg/dL Final    Total Protein 05/14/2024 6.6  6.0 - 8.5 g/dL Final    Albumin 05/14/2024 4.2  3.5 - 5.2 g/dL Final    ALT (SGPT) 05/14/2024 16  1 - 33 U/L Final    AST (SGOT) 05/14/2024 15  1 - 32 U/L Final    Alkaline Phosphatase 05/14/2024 106  39 - 117 U/L Final    Total Bilirubin 05/14/2024 0.3  0.0 - 1.2 mg/dL Final    Globulin 05/14/2024 2.4  gm/dL Final    A/G Ratio 05/14/2024 1.8  g/dL Final    BUN/Creatinine Ratio 05/14/2024 12.3  7.0 - 25.0 Final    Anion Gap 05/14/2024 10.4  5.0 - 15.0  mmol/L Final    eGFR 05/14/2024 73.5  >60.0 mL/min/1.73 Final    Total Cholesterol 05/14/2024 195  0 - 200 mg/dL Final    Triglycerides 05/14/2024 180 (H)  0 - 150 mg/dL Final    HDL Cholesterol 05/14/2024 42  40 - 60 mg/dL Final    LDL Cholesterol  05/14/2024 121 (H)  0 - 100 mg/dL Final    VLDL Cholesterol 05/14/2024 32  5 - 40 mg/dL Final    LDL/HDL Ratio 05/14/2024 2.79   Final    TSH 05/14/2024 1.600  0.270 - 4.200 uIU/mL Final    T Uptake 05/14/2024 1.05  0.80 - 1.30 TBI Final    T4, Total 05/14/2024 8.67  4.50 - 11.70 mcg/dL Final    T4 results may be falsely increased if patient taking Biotin.    WBC 05/14/2024 5.68  3.40 - 10.80 10*3/mm3 Final    RBC 05/14/2024 4.61  3.77 - 5.28 10*6/mm3 Final    Hemoglobin 05/14/2024 13.3  12.0 - 15.9 g/dL Final    Hematocrit 05/14/2024 40.1  34.0 - 46.6 % Final    MCV 05/14/2024 87.0  79.0 - 97.0 fL Final    MCH 05/14/2024 28.9  26.6 - 33.0 pg Final    MCHC 05/14/2024 33.2  31.5 - 35.7 g/dL Final    RDW 05/14/2024 13.3  12.3 - 15.4 % Final    RDW-SD 05/14/2024 41.9  37.0 - 54.0 fl Final    MPV 05/14/2024 10.0  6.0 - 12.0 fL Final    Platelets 05/14/2024 325  140 - 450 10*3/mm3 Final    Neutrophil % 05/14/2024 50.1  42.7 - 76.0 % Final    Lymphocyte % 05/14/2024 37.9  19.6 - 45.3 % Final    Monocyte % 05/14/2024 6.7  5.0 - 12.0 % Final    Eosinophil % 05/14/2024 4.4  0.3 - 6.2 % Final    Basophil % 05/14/2024 0.7  0.0 - 1.5 % Final    Immature Grans % 05/14/2024 0.2  0.0 - 0.5 % Final    Neutrophils, Absolute 05/14/2024 2.85  1.70 - 7.00 10*3/mm3 Final    Lymphocytes, Absolute 05/14/2024 2.15  0.70 - 3.10 10*3/mm3 Final    Monocytes, Absolute 05/14/2024 0.38  0.10 - 0.90 10*3/mm3 Final    Eosinophils, Absolute 05/14/2024 0.25  0.00 - 0.40 10*3/mm3 Final    Basophils, Absolute 05/14/2024 0.04  0.00 - 0.20 10*3/mm3 Final    Immature Grans, Absolute 05/14/2024 0.01  0.00 - 0.05 10*3/mm3 Final    nRBC 05/14/2024 0.0  0.0 - 0.2 /100 WBC Final                 Assessment and Plan     Diagnoses and all orders for this visit:    1. Mixed hyperlipidemia (Primary)    2. Essential hypertension    3. Acquired hypothyroidism    Chronic and stable mixed hyperlipidemia: Have reviewed above blood work with her today.  Triglycerides and LDL cholesterol were slightly elevated.  She had stopped taking her Crestor and had recently started back at least 3 days weekly per cardiologist recommendation.  I have stressed the importance of decreasing sugar and carbohydrates in diet and continue to be active.  Plan to recheck fasting lab work in 6 months with annual Medicare wellness exam.    Chronic and stable hypertension: Blood pressure was in therapeutic range. She will continue her current carvedilol medication at home as directed.  Chronic and stable acquired hypothyroidism: Above thyroid testing was in therapeutic range.  Continue her current levothyroxine 50 mcg medication to pharmacy.    I spent 20 minutes caring for Codie on this date of service. This time includes time spent by me in the following activities:preparing for the visit, reviewing tests, obtaining and/or reviewing a separately obtained history, performing a medically appropriate examination and/or evaluation , counseling and educating the patient/family/caregiver, and documenting information in the medical record    Follow Up   Return if symptoms worsen or fail to improve.  Patient was given instructions and counseling regarding her condition or for health maintenance advice. Please see specific information pulled into the AVS if appropriate.     [facility]  [address] [place] [id number]  Dept: [phone]  Dept Fax: [phone]  Loc: [phone]  Loc Fax: [phone]

## 2024-05-24 NOTE — PROGRESS NOTES
I, Dr. Harish Oleary, have reviewed the notes, assessments, and/or procedures performed by Sri MCCULLOUGH, that occurred within our practice at [facility] location, I concur with her documentation of Codie Munson. I have a current collaborative medical agreement with Sri MCCULLOUGH.

## 2024-05-31 ENCOUNTER — HOSPITAL ENCOUNTER (OUTPATIENT)
Dept: MRI IMAGING | Facility: HOSPITAL | Age: 79
Discharge: HOME | End: 2024-05-31
Payer: MEDICARE

## 2024-05-31 DIAGNOSIS — S19.9XXD INJURY OF NECK, SUBSEQUENT ENCOUNTER: ICD-10-CM

## 2024-05-31 DIAGNOSIS — I42.9 CARDIOMYOPATHY, UNSPECIFIED TYPE: ICD-10-CM

## 2024-05-31 DIAGNOSIS — E78.2 MIXED HYPERLIPIDEMIA: ICD-10-CM

## 2024-05-31 PROCEDURE — 72141 MRI NECK SPINE W/O DYE: CPT

## 2024-06-07 ENCOUNTER — OFFICE VISIT (OUTPATIENT)
Dept: INTERNAL MEDICINE | Facility: CLINIC | Age: 79
End: 2024-06-07
Payer: MEDICARE

## 2024-06-07 VITALS
OXYGEN SATURATION: 98 % | DIASTOLIC BLOOD PRESSURE: 80 MMHG | BODY MASS INDEX: 24.62 KG/M2 | SYSTOLIC BLOOD PRESSURE: 134 MMHG | WEIGHT: 147.8 LBS | TEMPERATURE: 97.7 F | HEIGHT: 65 IN | HEART RATE: 67 BPM

## 2024-06-07 DIAGNOSIS — Z78.0 POST-MENOPAUSAL: ICD-10-CM

## 2024-06-07 DIAGNOSIS — E03.4 HYPOTHYROIDISM DUE TO ACQUIRED ATROPHY OF THYROID: Primary | ICD-10-CM

## 2024-06-07 DIAGNOSIS — M85.80 OSTEOPENIA, UNSPECIFIED LOCATION: ICD-10-CM

## 2024-06-07 DIAGNOSIS — Z23 NEED FOR VACCINATION: ICD-10-CM

## 2024-06-07 PROCEDURE — 1160F RVW MEDS BY RX/DR IN RCRD: CPT | Performed by: INTERNAL MEDICINE

## 2024-06-07 PROCEDURE — 90471 IMMUNIZATION ADMIN: CPT | Performed by: INTERNAL MEDICINE

## 2024-06-07 PROCEDURE — 90715 TDAP VACCINE 7 YRS/> IM: CPT | Performed by: INTERNAL MEDICINE

## 2024-06-07 PROCEDURE — 3075F SYST BP GE 130 - 139MM HG: CPT | Performed by: INTERNAL MEDICINE

## 2024-06-07 PROCEDURE — 1159F MED LIST DOCD IN RCRD: CPT | Performed by: INTERNAL MEDICINE

## 2024-06-07 PROCEDURE — 3079F DIAST BP 80-89 MM HG: CPT | Performed by: INTERNAL MEDICINE

## 2024-06-07 PROCEDURE — 1126F AMNT PAIN NOTED NONE PRSNT: CPT | Performed by: INTERNAL MEDICINE

## 2024-06-07 PROCEDURE — 99214 OFFICE O/P EST MOD 30 MIN: CPT | Performed by: INTERNAL MEDICINE

## 2024-06-07 RX ORDER — MULTIVITAMIN WITH IRON
TABLET ORAL
COMMUNITY

## 2024-06-07 NOTE — PROGRESS NOTES
"Chief Complaint  Establish Care    Subjective        Codie Munson presents to [facility]IMARY [facility]  History of Present Illness    78 year old lady here to establish care.     GERD - takes Esomeprazole for symptoms for about 15 years. Reflux symptoms are currently controlled on this.    Hypothyroidism - currently on Levothyroxine. She is unsure why she was put on this. Had abnormal TSH in 2021 and 2022. Last provider was checking T uptake and Total T4 studies.     She follows with Dr. Lane for cardiology care. She had a cath in 2005 with nonobstructive coronary disease. She had an echo at that time with mildly reduced EF To 40%. Most recent echo was in 2022 and had grade 1 diastolic dysfunction with normal LV EF. She had a Holter monitor in 2022 with a few beats of NSVT and a few PVCs. She is currently managed on low dose Carvedilol and is not having any issues with this.     She is on Crestor for HLD. Usually gets her labs done first thing in the morning and is fasting. Last LDL was >100, but on review, most LDLs have been <100.     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 [place]  of over 60 years.    Objective   Vital Signs:  /80 (BP Location: Left arm, Patient Position: Sitting, Cuff Size: Adult)   Pulse 67   Temp 97.7 °F (36.5 °C) (Infrared)   Ht 165.1 cm (65\")   Wt 67 kg (147 lb 12.8 oz)   SpO2 98%   BMI 24.60 kg/m²   Estimated body mass index is 24.6 kg/m² as calculated from the following:    Height as of this encounter: 165.1 cm (65\").    Weight as of this encounter: 67 kg (147 lb 12.8 oz).       BMI is within normal parameters. No other follow-up for BMI required.      Physical Exam  Vitals and nursing note reviewed.   Constitutional:       General: She is not in acute distress.     Appearance: Normal appearance. She is normal weight.   HENT:      Head: Normocephalic and " atraumatic.      Right Ear: Tympanic membrane, ear canal and external ear normal.      Left Ear: Tympanic membrane, ear canal and external ear normal.      Nose: Nose normal.      Mouth/Throat:      Mouth: Mucous membranes are moist.      Pharynx: No oropharyngeal exudate.   Eyes:      Pupils: Pupils are equal, round, and reactive to light.   Cardiovascular:      Rate and Rhythm: Normal rate and regular rhythm.      Pulses: Normal pulses.      Heart sounds: Normal heart sounds. No murmur heard.     No friction rub. No gallop.   Pulmonary:      Effort: Pulmonary effort is normal. No respiratory distress.      Breath sounds: Normal breath sounds. No wheezing or rales.   Abdominal:      General: Abdomen is flat. Bowel sounds are normal. There is no distension.      Palpations: Abdomen is soft.      Tenderness: There is no abdominal tenderness.   Musculoskeletal:         General: Normal range of motion.      Cervical back: Normal range of motion.   Lymphadenopathy:      Cervical: No cervical adenopathy.   Skin:     General: Skin is warm.      Capillary Refill: Capillary refill takes less than 2 seconds.   Neurological:      General: No focal deficit present.      Mental Status: She is alert and oriented to person, place, and time. Mental status is at baseline.   Psychiatric:         Mood and Affect: Mood normal.         Behavior: Behavior normal.         Thought Content: Thought content normal.         Judgment: Judgment normal.        Result Review :    The following data was reviewed by: Eunice Barger MD on 06/07/2024:    Data reviewed : Consultant notes Cardiology visit, Dr. Lane 5/17/2024             Assessment and Plan     Diagnoses and all orders for this visit:    1. Hypothyroidism due to acquired atrophy of thyroid (Primary)  -     TSH; Future  -     T4, free; Future    2. Osteopenia, unspecified location  -     DEXA Bone Density Axial    3. Post-menopausal  -     DEXA Bone Density Axial    4. Need for  vaccination  -     Tdap Vaccine Greater Than or Equal To 6yo IM      Stop thyroid meds for now as TSH never over 7.  We will re-check labs in 6 weeks and then f/u in clinic in 6 months.     DEXA ordered and TDAP today         Follow Up     Return in about 6 months (around 12/7/2024) for f/u multiple chronic medicla issues and thyroid.  Patient was given instructions and counseling regarding her condition or for health maintenance advice. Please see specific information pulled into the AVS if appropriate.       Merritt Mathews MD  Internal Medicine/Pediatrics, PGY-3      I have seen and examined the patient independently.  Agree with above.     Eunice Barger MD  Attending physician  Internal Medicine and Pediatrics

## 2024-06-21 ENCOUNTER — APPOINTMENT (OUTPATIENT)
Dept: BONE DENSITY | Facility: HOSPITAL | Age: 79
End: 2024-06-21
Payer: MEDICARE

## 2024-06-21 PROCEDURE — 77080 DXA BONE DENSITY AXIAL: CPT

## 2024-07-08 ENCOUNTER — TRANSCRIBE ORDERS (OUTPATIENT)
Dept: ADMINISTRATIVE | Facility: HOSPITAL | Age: 79
End: 2024-07-08
Payer: MEDICARE

## 2024-07-08 DIAGNOSIS — Z12.31 VISIT FOR SCREENING MAMMOGRAM: Primary | ICD-10-CM

## 2024-07-19 ENCOUNTER — LAB (OUTPATIENT)
Dept: LAB | Facility: HOSPITAL | Age: 79
End: 2024-07-19
Payer: MEDICARE

## 2024-07-19 PROCEDURE — 84443 ASSAY THYROID STIM HORMONE: CPT | Performed by: INTERNAL MEDICINE

## 2024-07-19 PROCEDURE — 84439 ASSAY OF FREE THYROXINE: CPT | Performed by: INTERNAL MEDICINE

## 2024-08-09 ENCOUNTER — HOSPITAL ENCOUNTER (OUTPATIENT)
Dept: MAMMOGRAPHY | Facility: HOSPITAL | Age: 79
Discharge: HOME | End: 2024-08-09
Admitting: INTERNAL MEDICINE
Payer: MEDICARE

## 2024-08-09 DIAGNOSIS — Z12.31 VISIT FOR SCREENING MAMMOGRAM: ICD-10-CM

## 2024-08-09 PROCEDURE — 77067 SCR MAMMO BI INCL CAD: CPT | Performed by: RADIOLOGY

## 2024-08-09 PROCEDURE — 77063 BREAST TOMOSYNTHESIS BI: CPT

## 2024-08-09 PROCEDURE — 77063 BREAST TOMOSYNTHESIS BI: CPT | Performed by: RADIOLOGY

## 2024-08-09 PROCEDURE — 77067 SCR MAMMO BI INCL CAD: CPT

## 2024-10-21 ENCOUNTER — OFFICE VISIT (OUTPATIENT)
Dept: INTERNAL MEDICINE | Facility: CLINIC | Age: 79
End: 2024-10-21
Payer: MEDICARE

## 2024-10-21 VITALS
SYSTOLIC BLOOD PRESSURE: 118 MMHG | OXYGEN SATURATION: 96 % | DIASTOLIC BLOOD PRESSURE: 80 MMHG | HEART RATE: 65 BPM | TEMPERATURE: 97.5 F | BODY MASS INDEX: 24.86 KG/M2 | HEIGHT: 65 IN | WEIGHT: 149.2 LBS

## 2024-10-21 DIAGNOSIS — T83.9XXA PROBLEM WITH VAGINAL PESSARY, INITIAL ENCOUNTER: ICD-10-CM

## 2024-10-21 DIAGNOSIS — N30.00 ACUTE CYSTITIS WITHOUT HEMATURIA: Primary | ICD-10-CM

## 2024-10-21 DIAGNOSIS — R10.33 PERIUMBILICAL ABDOMINAL PAIN: ICD-10-CM

## 2024-10-21 LAB
BILIRUB BLD-MCNC: NEGATIVE MG/DL
CLARITY, POC: CLEAR
COLOR UR: YELLOW
EXPIRATION DATE: ABNORMAL
GLUCOSE UR STRIP-MCNC: NEGATIVE MG/DL
KETONES UR QL: NEGATIVE
LEUKOCYTE EST, POC: ABNORMAL
Lab: ABNORMAL
NITRITE UR-MCNC: NEGATIVE MG/ML
PH UR: 5.5 [PH] (ref 5–8)
PROT UR STRIP-MCNC: NEGATIVE MG/DL
RBC # UR STRIP: ABNORMAL /UL
SP GR UR: 1.01 (ref 1–1.03)
UROBILINOGEN UR QL: NORMAL

## 2024-10-21 PROCEDURE — 1126F AMNT PAIN NOTED NONE PRSNT: CPT | Performed by: NURSE PRACTITIONER

## 2024-10-21 PROCEDURE — 3074F SYST BP LT 130 MM HG: CPT | Performed by: NURSE PRACTITIONER

## 2024-10-21 PROCEDURE — 99214 OFFICE O/P EST MOD 30 MIN: CPT | Performed by: NURSE PRACTITIONER

## 2024-10-21 PROCEDURE — 3079F DIAST BP 80-89 MM HG: CPT | Performed by: NURSE PRACTITIONER

## 2024-10-21 PROCEDURE — 81003 URINALYSIS AUTO W/O SCOPE: CPT | Performed by: NURSE PRACTITIONER

## 2024-10-21 RX ORDER — SULFAMETHOXAZOLE/TRIMETHOPRIM 800-160 MG
1 TABLET ORAL 2 TIMES DAILY
Qty: 14 TABLET | Refills: 0 | Status: SHIPPED | OUTPATIENT
Start: 2024-10-21

## 2024-10-21 NOTE — PROGRESS NOTES
Chief Complaint   Patient presents with    Abdominal Pain     Navel area has been really sore to the touch, abdomen feels hard       Subjective     Codie Munson is a 79 y.o. female being seen for an acute visit for abdominal pain. This began 2 weeks, described as a burning pain n suprapubic area. Described as a constant, soreness. She denies urinary changes, bowel changes, N/V. She wears a pessary, which was cleaned this 2 months ago. She has history GB removal.       Abdominal Pain  Pertinent negatives include no arthralgias.        Allergies   Allergen Reactions    Augmentin [Amoxicillin-Pot Clavulanate] Rash    Biaxin [Clarithromycin] Rash         Current Outpatient Medications:     carvedilol (COREG) 3.125 MG tablet, TAKE 1 TABLET BY MOUTH TWICE A DAY, Disp: 180 tablet, Rfl: 3    conjugated estrogens (PREMARIN) 0.625 MG/GM vaginal cream, Insert 0.625 g into the vagina Every Night. Monday Wednesday friday, Disp: , Rfl:     esomeprazole (NexIUM) 40 MG capsule, Take 1 capsule by mouth Every Morning Before Breakfast., Disp: , Rfl:     Multiple Vitamins-Minerals (CENTRUM ADULT 50+ MULTIGUMMIES PO), Take  by mouth., Disp: , Rfl:     rosuvastatin (CRESTOR) 20 MG tablet, Take 1 tablet by mouth Every Other Day., Disp: 45 tablet, Rfl: 3    Cholecalciferol (Vitamin D-3) 125 MCG (5000 UT) tablet, Take  by mouth. (Patient not taking: Reported on 10/21/2024), Disp: , Rfl:     cyclobenzaprine (FLEXERIL) 10 MG tablet, Take 1 tablet by mouth 3 (Three) Times a Day As Needed for Muscle Spasms. (Patient not taking: Reported on 6/7/2024), Disp: 30 tablet, Rfl: 0    fluocinonide (LIDEX) 0.05 % external solution, , Disp: , Rfl:     fluticasone (FLONASE) 50 MCG/ACT nasal spray, Administer 2 sprays into the nostril(s) as directed by provider As Needed. (Patient not taking: Reported on 10/21/2024), Disp: , Rfl:     Magnesium 250 MG tablet, Take  by mouth. (Patient not taking: Reported on 10/21/2024), Disp: , Rfl:     The following  portions of the patient's history were reviewed and updated as appropriate: allergies, current medications, past family history, past medical history, past social history, past surgical history, and problem list.    Review of Systems   Cardiovascular:  Negative for chest pain and leg swelling.   Gastrointestinal:  Positive for abdominal pain. Negative for abdominal distention and anal bleeding.   Genitourinary:  Positive for pelvic pain. Negative for menstrual problem and urgency.   Musculoskeletal:  Negative for arthralgias.   Hematological: Negative.        Assessment     Physical Exam  Vitals reviewed.   Constitutional:       Appearance: Normal appearance.   Cardiovascular:      Rate and Rhythm: Normal rate and regular rhythm.      Pulses: Normal pulses.      Heart sounds: Normal heart sounds.   Pulmonary:      Effort: Pulmonary effort is normal. No respiratory distress.      Breath sounds: Normal breath sounds. No stridor.   Abdominal:      Tenderness: There is abdominal tenderness in the periumbilical area.   Neurological:      General: No focal deficit present.      Mental Status: She is alert and oriented to person, place, and time.   Psychiatric:         Mood and Affect: Mood normal.         Behavior: Behavior normal.         Thought Content: Thought content normal.         Plan     POC Urine positive for leuks and blood       Diagnosis Plan   1. Acute cystitis without hematuria  Urine Culture - Urine, Urine, Clean Catch    Urine Culture - Urine, Urine, Clean Catch    sulfamethoxazole-trimethoprim (Bactrim DS) 800-160 MG per tablet      2. Periumbilical abdominal pain  POCT urinalysis dipstick, automated    Urine Culture - Urine, Urine, Clean Catch    Urine Culture - Urine, Urine, Clean Catch    sulfamethoxazole-trimethoprim (Bactrim DS) 800-160 MG per tablet      3. Problem with vaginal pessary, initial encounter      I have advised that she remove her pessary, alert for discharge or blood, clean and  replace unless s/s of infection are seen.         Follow up in 2 weeks with UA for clear

## 2024-10-22 PROBLEM — T83.9XXA PROBLEM WITH VAGINAL PESSARY: Status: ACTIVE | Noted: 2024-10-22

## 2024-10-22 PROBLEM — R10.33 PERIUMBILICAL ABDOMINAL PAIN: Status: ACTIVE | Noted: 2024-10-22

## 2024-10-22 PROBLEM — N30.00 ACUTE CYSTITIS WITHOUT HEMATURIA: Status: ACTIVE | Noted: 2024-10-22

## 2024-10-23 LAB
BACTERIA UR CULT: NORMAL
BACTERIA UR CULT: NORMAL

## 2024-10-24 ENCOUNTER — OFFICE VISIT (OUTPATIENT)
Dept: ORTHOPEDIC SURGERY | Facility: CLINIC | Age: 79
End: 2024-10-24
Payer: MEDICARE

## 2024-10-24 VITALS
WEIGHT: 149 LBS | SYSTOLIC BLOOD PRESSURE: 120 MMHG | BODY MASS INDEX: 24.83 KG/M2 | HEIGHT: 65 IN | DIASTOLIC BLOOD PRESSURE: 68 MMHG

## 2024-10-24 DIAGNOSIS — M17.12 PRIMARY OSTEOARTHRITIS OF LEFT KNEE: ICD-10-CM

## 2024-10-24 DIAGNOSIS — M25.562 LEFT KNEE PAIN, UNSPECIFIED CHRONICITY: Primary | ICD-10-CM

## 2024-10-24 PROCEDURE — 1160F RVW MEDS BY RX/DR IN RCRD: CPT | Performed by: ORTHOPAEDIC SURGERY

## 2024-10-24 PROCEDURE — 3078F DIAST BP <80 MM HG: CPT | Performed by: ORTHOPAEDIC SURGERY

## 2024-10-24 PROCEDURE — 1159F MED LIST DOCD IN RCRD: CPT | Performed by: ORTHOPAEDIC SURGERY

## 2024-10-24 PROCEDURE — 99203 OFFICE O/P NEW LOW 30 MIN: CPT | Performed by: ORTHOPAEDIC SURGERY

## 2024-10-24 PROCEDURE — 3074F SYST BP LT 130 MM HG: CPT | Performed by: ORTHOPAEDIC SURGERY

## 2024-10-24 RX ORDER — MELOXICAM 7.5 MG/1
7.5 TABLET ORAL DAILY
Qty: 30 TABLET | Refills: 0 | Status: SHIPPED | OUTPATIENT
Start: 2024-10-24

## 2024-10-24 RX ORDER — METHYLPREDNISOLONE 4 MG
TABLET, DOSE PACK ORAL
Qty: 1 EACH | Refills: 0 | Status: SHIPPED | OUTPATIENT
Start: 2024-10-24

## 2024-11-01 ENCOUNTER — OFFICE VISIT (OUTPATIENT)
Dept: INTERNAL MEDICINE | Facility: CLINIC | Age: 79
End: 2024-11-01
Payer: MEDICARE

## 2024-11-01 VITALS
BODY MASS INDEX: 25.12 KG/M2 | OXYGEN SATURATION: 98 % | DIASTOLIC BLOOD PRESSURE: 80 MMHG | TEMPERATURE: 97.1 F | SYSTOLIC BLOOD PRESSURE: 126 MMHG | HEIGHT: 65 IN | HEART RATE: 84 BPM | WEIGHT: 150.8 LBS

## 2024-11-01 DIAGNOSIS — N30.00 ACUTE CYSTITIS WITHOUT HEMATURIA: Primary | ICD-10-CM

## 2024-11-01 LAB
BILIRUB BLD-MCNC: NEGATIVE MG/DL
CLARITY, POC: ABNORMAL
COLOR UR: YELLOW
EXPIRATION DATE: ABNORMAL
GLUCOSE UR STRIP-MCNC: NEGATIVE MG/DL
KETONES UR QL: NEGATIVE
LEUKOCYTE EST, POC: ABNORMAL
Lab: ABNORMAL
NITRITE UR-MCNC: NEGATIVE MG/ML
PH UR: 5.5 [PH] (ref 5–8)
PROT UR STRIP-MCNC: NEGATIVE MG/DL
RBC # UR STRIP: ABNORMAL /UL
SP GR UR: 1.01 (ref 1–1.03)
UROBILINOGEN UR QL: NORMAL

## 2024-11-01 PROCEDURE — 81003 URINALYSIS AUTO W/O SCOPE: CPT | Performed by: NURSE PRACTITIONER

## 2024-11-01 PROCEDURE — 1126F AMNT PAIN NOTED NONE PRSNT: CPT | Performed by: NURSE PRACTITIONER

## 2024-11-01 PROCEDURE — 3079F DIAST BP 80-89 MM HG: CPT | Performed by: NURSE PRACTITIONER

## 2024-11-01 PROCEDURE — 99213 OFFICE O/P EST LOW 20 MIN: CPT | Performed by: NURSE PRACTITIONER

## 2024-11-01 PROCEDURE — 3074F SYST BP LT 130 MM HG: CPT | Performed by: NURSE PRACTITIONER

## 2024-11-01 NOTE — PROGRESS NOTES
Chief Complaint   Patient presents with    Urinary Tract Infection     Recheck       Subjective     Codie Munson is a 79 y.o. female being seen for a follow up appointment today regarding acute UTI. She was in the office 2 weeks ago for suprapubic pain. She was placed on Bactrim DS BID for 7 days. She wears a pessary as well, and was advised to remove and clean this.     History of Present Illness     Allergies   Allergen Reactions    Augmentin [Amoxicillin-Pot Clavulanate] Rash    Biaxin [Clarithromycin] Rash         Current Outpatient Medications:     carvedilol (COREG) 3.125 MG tablet, TAKE 1 TABLET BY MOUTH TWICE A DAY, Disp: 180 tablet, Rfl: 3    conjugated estrogens (PREMARIN) 0.625 MG/GM vaginal cream, Insert 0.625 g into the vagina Every Night. Monday Wednesday friday, Disp: , Rfl:     esomeprazole (NexIUM) 40 MG capsule, Take 1 capsule by mouth Every Morning Before Breakfast., Disp: , Rfl:     meloxicam (MOBIC) 7.5 MG tablet, Take 1 tablet by mouth Daily., Disp: 30 tablet, Rfl: 0    Multiple Vitamins-Minerals (CENTRUM ADULT 50+ MULTIGUMMIES PO), Take  by mouth., Disp: , Rfl:     rosuvastatin (CRESTOR) 20 MG tablet, Take 1 tablet by mouth Every Other Day., Disp: 45 tablet, Rfl: 3    methylPREDNISolone (MEDROL) 4 MG dose pack, Take as directed on package instructions. (Patient not taking: Reported on 11/1/2024), Disp: 1 each, Rfl: 0    The following portions of the patient's history were reviewed and updated as appropriate: allergies, current medications, past family history, past medical history, past social history, past surgical history, and problem list.    Review of Systems   Constitutional: Negative.    HENT: Negative.     Gastrointestinal:  Negative for abdominal distention, abdominal pain, anal bleeding, blood in stool, constipation, diarrhea and rectal pain.   Musculoskeletal:  Positive for arthralgias (knee).       Assessment     Physical Exam  Vitals reviewed.   Constitutional:        Appearance: Normal appearance.   Cardiovascular:      Rate and Rhythm: Normal rate and regular rhythm.      Pulses: Normal pulses.      Heart sounds: Normal heart sounds. No murmur heard.  Pulmonary:      Effort: Pulmonary effort is normal.      Breath sounds: Normal breath sounds.   Abdominal:      General: There is no distension.      Palpations: Abdomen is soft. There is no mass.      Tenderness: There is no abdominal tenderness.      Hernia: No hernia is present.   Musculoskeletal:      Right lower leg: No edema.   Neurological:      Mental Status: She is alert.   Psychiatric:         Mood and Affect: Mood normal.         Behavior: Behavior normal.         Plan     Her urine culture was negative 2 weeks ago    Diagnoses and all orders for this visit:    1. Acute cystitis without hematuria (Primary)  Comments:  Resolved  Orders:  -     POCT urinalysis dipstick, automated  -     Urine Culture - Urine, Urine, Clean Catch; Future  -     Urine Culture - Urine, Urine, Clean Catch        Her POC urine shows small leuks and blood. Negative Nitrites, and she is asymptomatic. No need to treat.    POCT urinalysis dipstick, automated (11/01/2024 13:51)     Follow up with PCP in December

## 2024-11-03 LAB
BACTERIA UR CULT: NORMAL
BACTERIA UR CULT: NORMAL

## 2024-11-19 DIAGNOSIS — M25.562 LEFT KNEE PAIN, UNSPECIFIED CHRONICITY: ICD-10-CM

## 2024-11-19 DIAGNOSIS — K44.9 GASTROESOPHAGEAL REFLUX DISEASE WITH HIATAL HERNIA: ICD-10-CM

## 2024-11-19 DIAGNOSIS — R73.9 HYPERGLYCEMIA: ICD-10-CM

## 2024-11-19 DIAGNOSIS — K21.9 GASTROESOPHAGEAL REFLUX DISEASE WITH HIATAL HERNIA: ICD-10-CM

## 2024-11-19 DIAGNOSIS — E78.2 MIXED HYPERLIPIDEMIA: Primary | ICD-10-CM

## 2024-11-19 DIAGNOSIS — I10 ESSENTIAL HYPERTENSION: ICD-10-CM

## 2024-12-05 ENCOUNTER — LAB (OUTPATIENT)
Dept: LAB | Facility: HOSPITAL | Age: 79
End: 2024-12-05
Payer: MEDICARE

## 2024-12-05 DIAGNOSIS — E78.2 MIXED HYPERLIPIDEMIA: ICD-10-CM

## 2024-12-05 DIAGNOSIS — M25.562 LEFT KNEE PAIN, UNSPECIFIED CHRONICITY: ICD-10-CM

## 2024-12-05 DIAGNOSIS — I10 ESSENTIAL HYPERTENSION: ICD-10-CM

## 2024-12-05 DIAGNOSIS — K21.9 GASTROESOPHAGEAL REFLUX DISEASE WITH HIATAL HERNIA: ICD-10-CM

## 2024-12-05 DIAGNOSIS — K44.9 GASTROESOPHAGEAL REFLUX DISEASE WITH HIATAL HERNIA: ICD-10-CM

## 2024-12-05 DIAGNOSIS — R73.9 HYPERGLYCEMIA: ICD-10-CM

## 2024-12-05 LAB
ALBUMIN SERPL-MCNC: 4.3 G/DL (ref 3.5–5.2)
ALBUMIN/GLOB SERPL: 1.9 G/DL
ALP SERPL-CCNC: 98 U/L (ref 39–117)
ALT SERPL W P-5'-P-CCNC: 22 U/L (ref 1–33)
ANION GAP SERPL CALCULATED.3IONS-SCNC: 13 MMOL/L (ref 5–15)
AST SERPL-CCNC: 24 U/L (ref 1–32)
BASOPHILS # BLD AUTO: 0.05 10*3/MM3 (ref 0–0.2)
BASOPHILS NFR BLD AUTO: 0.8 % (ref 0–1.5)
BILIRUB SERPL-MCNC: 0.3 MG/DL (ref 0–1.2)
BUN SERPL-MCNC: 10 MG/DL (ref 8–23)
BUN/CREAT SERPL: 12 (ref 7–25)
CALCIUM SPEC-SCNC: 9 MG/DL (ref 8.6–10.5)
CHLORIDE SERPL-SCNC: 100 MMOL/L (ref 98–107)
CHOLEST SERPL-MCNC: 176 MG/DL (ref 0–200)
CO2 SERPL-SCNC: 28 MMOL/L (ref 22–29)
CREAT SERPL-MCNC: 0.83 MG/DL (ref 0.57–1)
DEPRECATED RDW RBC AUTO: 42.1 FL (ref 37–54)
EGFRCR SERPLBLD CKD-EPI 2021: 70.9 ML/MIN/1.73
EOSINOPHIL # BLD AUTO: 0.2 10*3/MM3 (ref 0–0.4)
EOSINOPHIL NFR BLD AUTO: 3.1 % (ref 0.3–6.2)
ERYTHROCYTE [DISTWIDTH] IN BLOOD BY AUTOMATED COUNT: 13.1 % (ref 12.3–15.4)
GLOBULIN UR ELPH-MCNC: 2.3 GM/DL
GLUCOSE SERPL-MCNC: 81 MG/DL (ref 65–99)
HBA1C MFR BLD: 6.2 % (ref 4.8–5.6)
HCT VFR BLD AUTO: 41.7 % (ref 34–46.6)
HDLC SERPL-MCNC: 50 MG/DL (ref 40–60)
HGB BLD-MCNC: 13.9 G/DL (ref 12–15.9)
IMM GRANULOCYTES # BLD AUTO: 0.01 10*3/MM3 (ref 0–0.05)
IMM GRANULOCYTES NFR BLD AUTO: 0.2 % (ref 0–0.5)
LDLC SERPL CALC-MCNC: 90 MG/DL (ref 0–100)
LDLC/HDLC SERPL: 1.66 {RATIO}
LYMPHOCYTES # BLD AUTO: 2.09 10*3/MM3 (ref 0.7–3.1)
LYMPHOCYTES NFR BLD AUTO: 32.8 % (ref 19.6–45.3)
MCH RBC QN AUTO: 29.3 PG (ref 26.6–33)
MCHC RBC AUTO-ENTMCNC: 33.3 G/DL (ref 31.5–35.7)
MCV RBC AUTO: 88 FL (ref 79–97)
MONOCYTES # BLD AUTO: 0.46 10*3/MM3 (ref 0.1–0.9)
MONOCYTES NFR BLD AUTO: 7.2 % (ref 5–12)
NEUTROPHILS NFR BLD AUTO: 3.57 10*3/MM3 (ref 1.7–7)
NEUTROPHILS NFR BLD AUTO: 55.9 % (ref 42.7–76)
NRBC BLD AUTO-RTO: 0 /100 WBC (ref 0–0.2)
PLATELET # BLD AUTO: 344 10*3/MM3 (ref 140–450)
PMV BLD AUTO: 9.9 FL (ref 6–12)
POTASSIUM SERPL-SCNC: 3.7 MMOL/L (ref 3.5–5.2)
PROT SERPL-MCNC: 6.6 G/DL (ref 6–8.5)
RBC # BLD AUTO: 4.74 10*6/MM3 (ref 3.77–5.28)
SODIUM SERPL-SCNC: 141 MMOL/L (ref 136–145)
T4 FREE SERPL-MCNC: 1.28 NG/DL (ref 0.92–1.68)
TRIGL SERPL-MCNC: 214 MG/DL (ref 0–150)
TSH SERPL DL<=0.05 MIU/L-ACNC: 3.47 UIU/ML (ref 0.27–4.2)
VLDLC SERPL-MCNC: 36 MG/DL (ref 5–40)
WBC NRBC COR # BLD AUTO: 6.38 10*3/MM3 (ref 3.4–10.8)

## 2024-12-05 PROCEDURE — 84443 ASSAY THYROID STIM HORMONE: CPT

## 2024-12-05 PROCEDURE — 80061 LIPID PANEL: CPT

## 2024-12-05 PROCEDURE — 36415 COLL VENOUS BLD VENIPUNCTURE: CPT

## 2024-12-05 PROCEDURE — 83036 HEMOGLOBIN GLYCOSYLATED A1C: CPT

## 2024-12-05 PROCEDURE — 85025 COMPLETE CBC W/AUTO DIFF WBC: CPT

## 2024-12-05 PROCEDURE — 84439 ASSAY OF FREE THYROXINE: CPT

## 2024-12-05 PROCEDURE — 80053 COMPREHEN METABOLIC PANEL: CPT

## 2024-12-09 ENCOUNTER — OFFICE VISIT (OUTPATIENT)
Dept: INTERNAL MEDICINE | Facility: CLINIC | Age: 79
End: 2024-12-09
Payer: MEDICARE

## 2024-12-09 VITALS
TEMPERATURE: 97.8 F | DIASTOLIC BLOOD PRESSURE: 76 MMHG | OXYGEN SATURATION: 97 % | HEIGHT: 65 IN | SYSTOLIC BLOOD PRESSURE: 116 MMHG | WEIGHT: 149.8 LBS | BODY MASS INDEX: 24.96 KG/M2 | HEART RATE: 80 BPM

## 2024-12-09 DIAGNOSIS — M85.80 OSTEOPENIA, UNSPECIFIED LOCATION: ICD-10-CM

## 2024-12-09 DIAGNOSIS — K21.9 GASTRO-ESOPHAGEAL REFLUX DISEASE WITHOUT ESOPHAGITIS: ICD-10-CM

## 2024-12-09 DIAGNOSIS — E03.9 ACQUIRED HYPOTHYROIDISM: ICD-10-CM

## 2024-12-09 DIAGNOSIS — R30.0 DYSURIA: ICD-10-CM

## 2024-12-09 DIAGNOSIS — E78.2 MIXED HYPERLIPIDEMIA: ICD-10-CM

## 2024-12-09 DIAGNOSIS — Z78.0 POST-MENOPAUSE: ICD-10-CM

## 2024-12-09 DIAGNOSIS — R73.03 PREDIABETES: Primary | ICD-10-CM

## 2024-12-09 LAB
BILIRUB BLD-MCNC: NEGATIVE MG/DL
CLARITY, POC: ABNORMAL
COLOR UR: YELLOW
EXPIRATION DATE: ABNORMAL
GLUCOSE UR STRIP-MCNC: NEGATIVE MG/DL
KETONES UR QL: NEGATIVE
LEUKOCYTE EST, POC: ABNORMAL
Lab: ABNORMAL
NITRITE UR-MCNC: POSITIVE MG/ML
PH UR: 5.5 [PH] (ref 5–8)
PROT UR STRIP-MCNC: NEGATIVE MG/DL
RBC # UR STRIP: ABNORMAL /UL
SP GR UR: 1 (ref 1–1.03)
UROBILINOGEN UR QL: NORMAL

## 2024-12-09 PROCEDURE — 1126F AMNT PAIN NOTED NONE PRSNT: CPT | Performed by: INTERNAL MEDICINE

## 2024-12-09 PROCEDURE — 3078F DIAST BP <80 MM HG: CPT | Performed by: INTERNAL MEDICINE

## 2024-12-09 PROCEDURE — 1160F RVW MEDS BY RX/DR IN RCRD: CPT | Performed by: INTERNAL MEDICINE

## 2024-12-09 PROCEDURE — 3074F SYST BP LT 130 MM HG: CPT | Performed by: INTERNAL MEDICINE

## 2024-12-09 PROCEDURE — 81003 URINALYSIS AUTO W/O SCOPE: CPT | Performed by: INTERNAL MEDICINE

## 2024-12-09 PROCEDURE — 99214 OFFICE O/P EST MOD 30 MIN: CPT | Performed by: INTERNAL MEDICINE

## 2024-12-09 PROCEDURE — 1159F MED LIST DOCD IN RCRD: CPT | Performed by: INTERNAL MEDICINE

## 2024-12-09 RX ORDER — ESOMEPRAZOLE MAGNESIUM 40 MG/1
40 CAPSULE, DELAYED RELEASE ORAL
Qty: 90 CAPSULE | Refills: 1 | Status: SHIPPED | OUTPATIENT
Start: 2024-12-09

## 2024-12-09 RX ORDER — CEPHALEXIN 500 MG/1
500 CAPSULE ORAL 2 TIMES DAILY
Qty: 10 CAPSULE | Refills: 0 | Status: SHIPPED | OUTPATIENT
Start: 2024-12-09

## 2024-12-09 NOTE — PROGRESS NOTES
"      Codie Munson is a 79 y.o. female who presents with a chief complaint of   Chief Complaint   Patient presents with   • Hypothyroidism     6 month follow up   • Difficulty Urinating     burning       Hypothyroidism    Difficulty Urinating        Urinary symptoms today.  Urinalysis submitted.        The following portions of the patient's history were reviewed and updated as appropriate: allergies, current medications, past family history, past medical history, past social history, past surgical history and problem list.      Current Outpatient Medications:   •  carvedilol (COREG) 3.125 MG tablet, TAKE 1 TABLET BY MOUTH TWICE A DAY, Disp: 180 tablet, Rfl: 3  •  conjugated estrogens (PREMARIN) 0.625 MG/GM vaginal cream, Insert 0.625 g into the vagina Every Night. Monday Wednesday friday, Disp: , Rfl:   •  esomeprazole (nexIUM) 40 MG capsule, Take 1 capsule by mouth Every Morning Before Breakfast., Disp: 90 capsule, Rfl: 1  •  Multiple Vitamins-Minerals (CENTRUM ADULT 50+ MULTIGUMMIES PO), Take  by mouth., Disp: , Rfl:   •  rosuvastatin (CRESTOR) 20 MG tablet, Take 1 tablet by mouth Every Other Day., Disp: 45 tablet, Rfl: 3  •  meloxicam (MOBIC) 7.5 MG tablet, Take 1 tablet by mouth Daily. (Patient not taking: Reported on 12/9/2024), Disp: 30 tablet, Rfl: 0            Physical Exam  /76 (BP Location: Left arm, Patient Position: Sitting, Cuff Size: Adult)   Pulse 80   Temp 97.8 °F (36.6 °C) (Infrared)   Ht 165.1 cm (65\")   Wt 67.9 kg (149 lb 12.8 oz)   SpO2 97%   BMI 24.93 kg/m²     Physical Exam  Vitals reviewed.   Constitutional:       General: She is not in acute distress.     Appearance: Normal appearance.   HENT:      Head: Normocephalic and atraumatic.      Nose: Nose normal.      Mouth/Throat:      Mouth: Mucous membranes are moist.   Eyes:      Conjunctiva/sclera: Conjunctivae normal.   Pulmonary:      Effort: Pulmonary effort is normal.   Skin:     General: Skin is warm and dry. "   Neurological:      General: No focal deficit present.      Mental Status: She is alert.   Psychiatric:         Mood and Affect: Mood normal.         Results for orders placed or performed in visit on 12/05/24   Comprehensive Metabolic Panel    Collection Time: 12/05/24  6:52 AM    Specimen: Blood   Result Value Ref Range    Glucose 81 65 - 99 mg/dL    BUN 10 8 - 23 mg/dL    Creatinine 0.83 0.57 - 1.00 mg/dL    Sodium 141 136 - 145 mmol/L    Potassium 3.7 3.5 - 5.2 mmol/L    Chloride 100 98 - 107 mmol/L    CO2 28.0 22.0 - 29.0 mmol/L    Calcium 9.0 8.6 - 10.5 mg/dL    Total Protein 6.6 6.0 - 8.5 g/dL    Albumin 4.3 3.5 - 5.2 g/dL    ALT (SGPT) 22 1 - 33 U/L    AST (SGOT) 24 1 - 32 U/L    Alkaline Phosphatase 98 39 - 117 U/L    Total Bilirubin 0.3 0.0 - 1.2 mg/dL    Globulin 2.3 gm/dL    A/G Ratio 1.9 g/dL    BUN/Creatinine Ratio 12.0 7.0 - 25.0    Anion Gap 13.0 5.0 - 15.0 mmol/L    eGFR 70.9 >60.0 mL/min/1.73   Lipid Panel With LDL / HDL Ratio    Collection Time: 12/05/24  6:52 AM    Specimen: Blood   Result Value Ref Range    Total Cholesterol 176 0 - 200 mg/dL    Triglycerides 214 (H) 0 - 150 mg/dL    HDL Cholesterol 50 40 - 60 mg/dL    LDL Cholesterol  90 0 - 100 mg/dL    VLDL Cholesterol 36 5 - 40 mg/dL    LDL/HDL Ratio 1.66    Hemoglobin A1c    Collection Time: 12/05/24  6:52 AM    Specimen: Blood   Result Value Ref Range    Hemoglobin A1C 6.20 (H) 4.80 - 5.60 %   T4, Free    Collection Time: 12/05/24  6:52 AM    Specimen: Blood   Result Value Ref Range    Free T4 1.28 0.92 - 1.68 ng/dL   TSH    Collection Time: 12/05/24  6:52 AM    Specimen: Blood   Result Value Ref Range    TSH 3.470 0.270 - 4.200 uIU/mL   CBC Auto Differential    Collection Time: 12/05/24  6:52 AM    Specimen: Blood   Result Value Ref Range    WBC 6.38 3.40 - 10.80 10*3/mm3    RBC 4.74 3.77 - 5.28 10*6/mm3    Hemoglobin 13.9 12.0 - 15.9 g/dL    Hematocrit 41.7 34.0 - 46.6 %    MCV 88.0 79.0 - 97.0 fL    MCH 29.3 26.6 - 33.0 pg    MCHC 33.3  31.5 - 35.7 g/dL    RDW 13.1 12.3 - 15.4 %    RDW-SD 42.1 37.0 - 54.0 fl    MPV 9.9 6.0 - 12.0 fL    Platelets 344 140 - 450 10*3/mm3    Neutrophil % 55.9 42.7 - 76.0 %    Lymphocyte % 32.8 19.6 - 45.3 %    Monocyte % 7.2 5.0 - 12.0 %    Eosinophil % 3.1 0.3 - 6.2 %    Basophil % 0.8 0.0 - 1.5 %    Immature Grans % 0.2 0.0 - 0.5 %    Neutrophils, Absolute 3.57 1.70 - 7.00 10*3/mm3    Lymphocytes, Absolute 2.09 0.70 - 3.10 10*3/mm3    Monocytes, Absolute 0.46 0.10 - 0.90 10*3/mm3    Eosinophils, Absolute 0.20 0.00 - 0.40 10*3/mm3    Basophils, Absolute 0.05 0.00 - 0.20 10*3/mm3    Immature Grans, Absolute 0.01 0.00 - 0.05 10*3/mm3    nRBC 0.0 0.0 - 0.2 /100 WBC           Diagnoses and all orders for this visit:    1. Prediabetes (Primary)  -     Comprehensive Metabolic Panel; Future  -     Hemoglobin A1c; Future    2. Dysuria  -     POCT urinalysis dipstick, automated    3. Acquired hypothyroidism  -     Lipid Panel With LDL / HDL Ratio; Future  -     T4, Free; Future  -     TSH; Future    4. Mixed hyperlipidemia  -     Lipid Panel With LDL / HDL Ratio; Future    5. Post-menopause  -     Vitamin D,25-Hydroxy; Future    6. Osteopenia, unspecified location  -     Vitamin D,25-Hydroxy; Future    7. Gastro-esophageal reflux disease without esophagitis  -     esomeprazole (nexIUM) 40 MG capsule; Take 1 capsule by mouth Every Morning Before Breakfast.  Dispense: 90 capsule; Refill: 1      Discussed changing percentage of her plate that is potatoes.  Discussed prediabetes testing.  Explained triglycerides vs LDL.     Continue to follow TSH over time and check Vitamin D next time.       F/u 6 months for AWV

## 2024-12-11 LAB
BACTERIA UR CULT: ABNORMAL
BACTERIA UR CULT: ABNORMAL
OTHER ANTIBIOTIC SUSC ISLT: ABNORMAL

## 2025-03-06 ENCOUNTER — OFFICE VISIT (OUTPATIENT)
Dept: INTERNAL MEDICINE | Facility: CLINIC | Age: 80
End: 2025-03-06
Payer: MEDICARE

## 2025-03-06 VITALS
OXYGEN SATURATION: 98 % | BODY MASS INDEX: 25.44 KG/M2 | HEART RATE: 79 BPM | HEIGHT: 64 IN | WEIGHT: 149 LBS | TEMPERATURE: 96.9 F | SYSTOLIC BLOOD PRESSURE: 112 MMHG | DIASTOLIC BLOOD PRESSURE: 72 MMHG

## 2025-03-06 DIAGNOSIS — J18.9 PNEUMONIA OF LEFT LOWER LOBE DUE TO INFECTIOUS ORGANISM: Primary | ICD-10-CM

## 2025-03-06 DIAGNOSIS — Z91.09 ENVIRONMENTAL ALLERGIES: ICD-10-CM

## 2025-03-06 PROCEDURE — 3078F DIAST BP <80 MM HG: CPT | Performed by: NURSE PRACTITIONER

## 2025-03-06 PROCEDURE — 1160F RVW MEDS BY RX/DR IN RCRD: CPT | Performed by: NURSE PRACTITIONER

## 2025-03-06 PROCEDURE — 99214 OFFICE O/P EST MOD 30 MIN: CPT | Performed by: NURSE PRACTITIONER

## 2025-03-06 PROCEDURE — 3074F SYST BP LT 130 MM HG: CPT | Performed by: NURSE PRACTITIONER

## 2025-03-06 PROCEDURE — 1159F MED LIST DOCD IN RCRD: CPT | Performed by: NURSE PRACTITIONER

## 2025-03-06 PROCEDURE — 1126F AMNT PAIN NOTED NONE PRSNT: CPT | Performed by: NURSE PRACTITIONER

## 2025-03-06 NOTE — PROGRESS NOTES
Codie Munson is a 79 y.o. female presenting today for   Chief Complaint   Patient presents with    Pneumonia     UC follow up     Pt presents for an acute visit; her PCP is Dr. Barger.    Subjective    History of Present Illness     Presents for f/u after care in the [facility] 2/2 pneumonia.  UC with Donna Martin, MARISEL (02/19/2025)   History of Present Illness  80 y/o female presents with sinus congestion, right sided facial pressure down into teeth and cough for about 3 days. She is having some soa.  She states she also has had her pulse go up to around 100 lately which is not normal for her. She has noticed chest pain for the last month mostly when laying down and has been having some persistent nausea for last couple of months. She is a patient of Dr. Lane. She has no history of afib or chf.  She denies swelling in her feet. She has PMH of cardiomyopathy.  Denies any fevers or chills. No nausea or diarrhea.  She was around a grandchild who had been sick recently with a a URI.      Clinical Impressions  Shortness of breath  Chest pain, unspecified type  Pneumonia of left lower lobe due to infectious organism  Viral URI    Medication Changes  Cefdinir 300 mg Oral 2 Times Daily  Doxycycline Monohydrate 100 mg Oral 2 Times Daily      She completed abx therapy. She notes persistent rhinorrhea which clear. There is lingering intermittent cough. She has allergies. She was previously a pt at [facility] and receiving immunotherapy. She stopped this about 4 yrs ago b/c it was causing her arms to be very sore.      The following portions of the patient's history were reviewed and updated as appropriate: allergies, current medications, problem list, past medical history, past surgical history, family history, and social history.          Objective    Vitals:    03/06/25 1402   BP: 112/72   BP Location: Left arm   Patient Position: Sitting   Cuff Size: Adult   Pulse: 79   Temp: 96.9 °F (36.1 °C)  "  TempSrc: Infrared   SpO2: 98%   Weight: 67.6 kg (149 lb)   Height: 162.6 cm (64\")     Body mass index is 25.58 kg/m².  Nursing notes and vitals reviewed.    Physical Exam  Constitutional:       General: She is not in acute distress.     Appearance: She is well-developed.   HENT:      Head: Normocephalic.      Right Ear: Hearing, tympanic membrane, ear canal and external ear normal.      Left Ear: Hearing, tympanic membrane, ear canal and external ear normal.      Nose: Mucosal edema and rhinorrhea present. Rhinorrhea is clear.      Right Turbinates: Swollen and pale.      Left Turbinates: Swollen and pale.      Mouth/Throat:      Mouth: Mucous membranes are moist.      Pharynx: Oropharynx is clear. Uvula midline.   Neck:      Thyroid: No thyroid mass or thyromegaly.   Cardiovascular:      Rate and Rhythm: Regular rhythm.      Pulses: Normal pulses.      Heart sounds: S1 normal and S2 normal. No murmur heard.     No friction rub. No gallop.   Pulmonary:      Effort: Pulmonary effort is normal.      Breath sounds: Normal breath sounds. No wheezing, rhonchi or rales.   Musculoskeletal:      Cervical back: Neck supple.   Lymphadenopathy:      Cervical: No cervical adenopathy.   Neurological:      Mental Status: She is alert and oriented to person, place, and time.      Cranial Nerves: No cranial nerve deficit.      Sensory: No sensory deficit.   Psychiatric:         Attention and Perception: She is attentive.         Speech: Speech normal.         Behavior: Behavior normal.           Data Reviewed:   with Donna Martin APRN (02/19/2025)     Recent Results (from the past 4 weeks)   POC Influenza A/B    Collection Time: 02/19/25 11:09 AM    Specimen: Swab   Result Value Ref Range    Rapid Influenza A Ag Negative Negative    Rapid Influenza B Ag Negative Negative    Internal Control Passed Passed    Lot Number [id number]     Expiration Date 12-    Covid-19 + Flu A&B AGMaurice ([id number])    Collection " Time: 02/19/25 11:53 AM    Specimen: Swab   Result Value Ref Range    SARS Antigen Not Detected Not Detected, Presumptive Negative    Influenza A Antigen JADIEL Not Detected Not Detected    Influenza B Antigen JADIEL Not Detected Not Detected    Internal Control Passed Passed    Lot Number [id number]     Expiration Date 11,212,025      XR Chest 2 View    Result Date: 2/19/2025  Evidence for potential developing left lower lobe pneumonia. Recommend correlation for signs or symptoms of acute infection and follow-up to ensure resolution. Electronically Signed: Reji Jordan MD  2/19/2025 11:35 AM EST  Workstation ID: [id number]       Assessment & Plan  Pneumonia of left lower lobe due to infectious organism  - abx completed  - lungs clear  - will forego repeat CXR       Environmental allergies  Patient Instructions   Take once daily over the counter Xyzal.  Try a nasal spray called Astepro.    - I also encouraged Mrs. Munson to schedule an appt to re-establish w/ Dr. Landeros.               Medications, including side effects, were discussed with the patient. Patient verbalized understanding.  The plan of care was discussed. All questions were answered. Patient verbalized understanding.

## 2025-05-03 ENCOUNTER — HOSPITAL ENCOUNTER (OUTPATIENT)
Facility: HOSPITAL | Age: 80
Discharge: HOME | End: 2025-05-03
Attending: STUDENT IN AN ORGANIZED HEALTH CARE EDUCATION/TRAINING PROGRAM
Payer: MEDICARE

## 2025-05-03 VITALS
RESPIRATION RATE: 18 BRPM | TEMPERATURE: 97.9 F | OXYGEN SATURATION: 100 % | HEART RATE: 69 BPM | SYSTOLIC BLOOD PRESSURE: 159 MMHG | DIASTOLIC BLOOD PRESSURE: 92 MMHG

## 2025-05-03 DIAGNOSIS — L03.011 PARONYCHIA OF FINGER OF RIGHT HAND: Primary | ICD-10-CM

## 2025-05-03 PROCEDURE — 99213 OFFICE O/P EST LOW 20 MIN: CPT | Performed by: STUDENT IN AN ORGANIZED HEALTH CARE EDUCATION/TRAINING PROGRAM

## 2025-05-03 PROCEDURE — G0463 HOSPITAL OUTPT CLINIC VISIT: HCPCS | Performed by: STUDENT IN AN ORGANIZED HEALTH CARE EDUCATION/TRAINING PROGRAM

## 2025-05-03 RX ORDER — SULFAMETHOXAZOLE AND TRIMETHOPRIM 800; 160 MG/1; MG/1
1 TABLET ORAL ONCE
Status: COMPLETED | OUTPATIENT
Start: 2025-05-03 | End: 2025-05-03

## 2025-05-03 RX ORDER — SULFAMETHOXAZOLE AND TRIMETHOPRIM 800; 160 MG/1; MG/1
1 TABLET ORAL 2 TIMES DAILY
Qty: 14 TABLET | Refills: 0 | Status: SHIPPED | OUTPATIENT
Start: 2025-05-03 | End: 2025-05-10

## 2025-05-03 RX ADMIN — SULFAMETHOXAZOLE AND TRIMETHOPRIM 1 TABLET: 800; 160 TABLET ORAL at 12:53

## 2025-05-03 NOTE — FSED PROVIDER NOTE
Subjective   History of Present Illness  Pt is a 79 y.o. female with PMH as listed who presents for   Chief Complaint   Patient presents with    Hand Pain     Pt has right 4th finger pain which began Tuesday with unknown injury       Patient is an 79-year-old female presents for pain to ring finger of right hand.  States that on Tuesday she was working outside in placing some woodchips from dirt fell and afterwards noticed some pain to the lateral edge of her fourth digit of her right hand.  Has had increasing pain and some erythema and warmth to the area as well.  Stuck a needle to try and open up the area, had some bloody drainage no purulent drainage noted.      Review of Systems    Past Medical History:   Diagnosis Date    Cardiomyopathy     Chronic cystitis     Cough     Depression     GERD (gastroesophageal reflux disease)     Hyperlipidemia     Hypertension     Scarlet fever     Spinal headache     Strain of thoracic region     Urethral prolapse     URI (upper respiratory infection)     Urinary retention        Allergies   Allergen Reactions    Augmentin [Amoxicillin-Pot Clavulanate] Rash    Biaxin [Clarithromycin] Rash       Past Surgical History:   Procedure Laterality Date    APPENDECTOMY      BLADDER SURGERY      CARDIAC CATHETERIZATION      Normal. Performed at [facility].     SECTION      CHOLECYSTECTOMY      CHOLECYSTECTOMY WITH INTRAOPERATIVE CHOLANGIOGRAM N/A 2022    Procedure: CHOLECYSTECTOMY LAPAROSCOPIC INTRAOPERATIVE CHOLANGIOGRAM;  Surgeon: Rl Ferro MD;  Location: [facility];  Service: General;  Laterality: N/A;    COLONOSCOPY      HYSTERECTOMY      VEIN LIGATION AND STRIPPING         Family History   Problem Relation Age of Onset    Heart failure Mother     Kidney failure Mother     Aneurysm Father     Heart disease Father         CABG    Stroke Father     Crohn's disease Sister     Prostate cancer Brother     Cancer Brother         bladder    Breast  cancer Neg Hx        Social History     Socioeconomic History    Marital status:    Tobacco Use    Smoking status: Never     Passive exposure: Never    Smokeless tobacco: Never    Tobacco comments:     drink caffiene daily   Vaping Use    Vaping status: Never Used   Substance and Sexual Activity    Alcohol use: No    Drug use: Never    Sexual activity: Not Currently     Partners: Male           Objective   Physical Exam  Constitutional:       Appearance: Normal appearance.   HENT:      Head: Normocephalic and atraumatic.      Mouth/Throat:      Mouth: Mucous membranes are moist.      Pharynx: Oropharynx is clear.   Eyes:      Conjunctiva/sclera: Conjunctivae normal.   Cardiovascular:      Rate and Rhythm: Normal rate.   Pulmonary:      Effort: Pulmonary effort is normal.   Abdominal:      General: Abdomen is flat.   Musculoskeletal:      Cervical back: Neck supple.      Comments: Erythema and swelling to lateral edge of fourth digit of right hand with no active purulent drainage.  Mildly tender.  Consistent with paronychia a.   Skin:     General: Skin is warm and dry.   Neurological:      Mental Status: She is alert.   Psychiatric:         Mood and Affect: Mood normal.         Musculoskeletal Ultrasound    Date/Time: 5/3/2025 12:48 PM    Performed by: Alex Carvalho MD  Authorized by: Alex Carvalho MD    Procedure details:     Indications: abscess and cellulitis      Indications comment:  Foreign body    Transverse view:  Visualized    Longitudinal view:  Visualized  Comments:      No obvious foreign body             ED Course  ED Course as of 05/03/25 1249   Sat May 03, 2025   1244 Patient is an 79-year-old female presents for pain to ring finger of right hand.  States that on Tuesday she was working outside in placing some woodchips from dirt fell and afterwards noticed some pain to the lateral edge of her fourth digit of her right hand.  Has had increasing pain and some erythema and warmth to the  area as well.  Stick a needle to try and open up the area, had some bloody drainage no purulent drainage noted.  Area is erythematous at this time, evaluated with ultrasound no obvious splinter or foreign body seen.  Given areas are already open and draining started on Bactrim for paronychia that is refractory to drainage and have patient follow-up with PCP.  Patient stands and agrees, all questions answered. [JF]      ED Course User Index  [JF] Alex Carvalho MD                                           Medical Decision Making  My differential: felon, paronychia, eponychia, flexortenosynovitis, cellulitis, sprain, strain, fracture, laceration, abrasion      Problems Addressed:  Paronychia of finger of right hand: complicated acute illness or injury    Risk  Prescription drug management.        Final diagnoses:   Paronychia of finger of right hand       ED Disposition  ED Disposition       ED Disposition   Discharge    Condition   Stable    Comment   --               Eunice Barger MD  [address] [address] [phone]    Schedule an appointment as soon as possible for a visit in 2 days  For re-evaluation         Medication List        New Prescriptions      sulfamethoxazole-trimethoprim 800-160 MG per tablet  Commonly known as: BACTRIM DS,SEPTRA DS  Take 1 tablet by mouth 2 (Two) Times a Day for 7 days.               Where to Get Your Medications        These medications were sent to [facility] [id number] - [place] DALAL - [address] - [phone]  - [phone]  [address] [facility] [place] [address]      Phone: [phone]   sulfamethoxazole-trimethoprim 800-160 MG per tablet

## 2025-05-12 RX ORDER — ROSUVASTATIN CALCIUM 20 MG/1
20 TABLET, COATED ORAL
Qty: 45 TABLET | Refills: 3 | Status: SHIPPED | OUTPATIENT
Start: 2025-05-12

## 2025-05-14 ENCOUNTER — LAB (OUTPATIENT)
Dept: LAB | Facility: HOSPITAL | Age: 80
End: 2025-05-14
Payer: MEDICARE

## 2025-05-14 DIAGNOSIS — Z78.0 POST-MENOPAUSE: ICD-10-CM

## 2025-05-14 DIAGNOSIS — M85.80 OSTEOPENIA, UNSPECIFIED LOCATION: ICD-10-CM

## 2025-05-14 DIAGNOSIS — E78.2 MIXED HYPERLIPIDEMIA: ICD-10-CM

## 2025-05-14 DIAGNOSIS — E03.9 ACQUIRED HYPOTHYROIDISM: ICD-10-CM

## 2025-05-14 DIAGNOSIS — R73.03 PREDIABETES: ICD-10-CM

## 2025-05-14 LAB
25(OH)D3 SERPL-MCNC: 72.6 NG/ML (ref 30–100)
ALBUMIN SERPL-MCNC: 4.5 G/DL (ref 3.5–5.2)
ALBUMIN/GLOB SERPL: 2 G/DL
ALP SERPL-CCNC: 108 U/L (ref 39–117)
ALT SERPL W P-5'-P-CCNC: 17 U/L (ref 1–33)
ANION GAP SERPL CALCULATED.3IONS-SCNC: 8 MMOL/L (ref 5–15)
AST SERPL-CCNC: 20 U/L (ref 1–32)
BILIRUB SERPL-MCNC: 0.4 MG/DL (ref 0–1.2)
BUN SERPL-MCNC: 10 MG/DL (ref 8–23)
BUN/CREAT SERPL: 10.8 (ref 7–25)
CALCIUM SPEC-SCNC: 9.3 MG/DL (ref 8.6–10.5)
CHLORIDE SERPL-SCNC: 103 MMOL/L (ref 98–107)
CHOLEST SERPL-MCNC: 157 MG/DL (ref 0–200)
CO2 SERPL-SCNC: 29 MMOL/L (ref 22–29)
CREAT SERPL-MCNC: 0.93 MG/DL (ref 0.57–1)
EGFRCR SERPLBLD CKD-EPI 2021: 61.9 ML/MIN/1.73
GLOBULIN UR ELPH-MCNC: 2.3 GM/DL
GLUCOSE SERPL-MCNC: 95 MG/DL (ref 65–99)
HBA1C MFR BLD: 5.9 % (ref 4.8–5.6)
HDLC SERPL-MCNC: 45 MG/DL (ref 40–60)
LDLC SERPL CALC-MCNC: 82 MG/DL (ref 0–100)
LDLC/HDLC SERPL: 1.71 {RATIO}
POTASSIUM SERPL-SCNC: 4.4 MMOL/L (ref 3.5–5.2)
PROT SERPL-MCNC: 6.8 G/DL (ref 6–8.5)
SODIUM SERPL-SCNC: 140 MMOL/L (ref 136–145)
T4 FREE SERPL-MCNC: 1.25 NG/DL (ref 0.92–1.68)
TRIGL SERPL-MCNC: 175 MG/DL (ref 0–150)
TSH SERPL DL<=0.05 MIU/L-ACNC: 3.37 UIU/ML (ref 0.27–4.2)
VLDLC SERPL-MCNC: 30 MG/DL (ref 5–40)

## 2025-05-14 PROCEDURE — 84439 ASSAY OF FREE THYROXINE: CPT

## 2025-05-14 PROCEDURE — 80061 LIPID PANEL: CPT

## 2025-05-14 PROCEDURE — 36415 COLL VENOUS BLD VENIPUNCTURE: CPT

## 2025-05-14 PROCEDURE — 83036 HEMOGLOBIN GLYCOSYLATED A1C: CPT

## 2025-05-14 PROCEDURE — 82306 VITAMIN D 25 HYDROXY: CPT

## 2025-05-14 PROCEDURE — 84443 ASSAY THYROID STIM HORMONE: CPT

## 2025-05-14 PROCEDURE — 80053 COMPREHEN METABOLIC PANEL: CPT

## 2025-05-16 ENCOUNTER — OFFICE VISIT (OUTPATIENT)
Dept: CARDIOLOGY | Facility: CLINIC | Age: 80
End: 2025-05-16
Payer: MEDICARE

## 2025-05-16 VITALS
DIASTOLIC BLOOD PRESSURE: 80 MMHG | BODY MASS INDEX: 25.1 KG/M2 | WEIGHT: 147 LBS | OXYGEN SATURATION: 97 % | HEIGHT: 64 IN | SYSTOLIC BLOOD PRESSURE: 122 MMHG

## 2025-05-16 DIAGNOSIS — E78.2 MIXED HYPERLIPIDEMIA: ICD-10-CM

## 2025-05-16 DIAGNOSIS — I10 ESSENTIAL HYPERTENSION: Primary | ICD-10-CM

## 2025-05-16 PROCEDURE — 1159F MED LIST DOCD IN RCRD: CPT | Performed by: INTERNAL MEDICINE

## 2025-05-16 PROCEDURE — 3074F SYST BP LT 130 MM HG: CPT | Performed by: INTERNAL MEDICINE

## 2025-05-16 PROCEDURE — 93000 ELECTROCARDIOGRAM COMPLETE: CPT | Performed by: INTERNAL MEDICINE

## 2025-05-16 PROCEDURE — 1160F RVW MEDS BY RX/DR IN RCRD: CPT | Performed by: INTERNAL MEDICINE

## 2025-05-16 PROCEDURE — 3079F DIAST BP 80-89 MM HG: CPT | Performed by: INTERNAL MEDICINE

## 2025-05-16 PROCEDURE — 99214 OFFICE O/P EST MOD 30 MIN: CPT | Performed by: INTERNAL MEDICINE

## 2025-05-16 NOTE — PROGRESS NOTES
Date of Office Visit: 2025  Encounter Provider: Anamika Lane MD  Place of Service: [facility] CARDIOLOGY  Patient Name: Codie Munson  :1945      Patient ID:  Codie Munson is a 79 y.o. female is here for  followup for cardiac risks.        History of Present Illness    She has a history of cardiomyopathy, hypothyroidism, hyperlipidemia, hypertension.     I originally saw her for complaints of dizziness, palpitations, and shortness of air in  . at that time she had a stress study which was abnormal with subsequent cardiac  catheterization at [facility] which showed no significant coronary artery disease.   Her left ventricular systolic function was normal. She however had an echocardiogram done  at the same time which showed an ejection fraction of 40%. Because of her lightheadedness  and dizziness, she had carotid duplex studies which showed minimal disease of the left  carotid artery. She then had repeat carotid duplex study performed on 2010  which showed no carotid artery disease.      She had a bladder resuspension with Dr. Harish Arellano.  She also has a pessary which was placed by Dr. Navarrete.  She has reflux of the lesser and greater saphenous veins. She sees Dr. Perrin for venous incompetence now and had vein stripping on the left.      Her echocardiogram which was done 2016, and this showed ejection fraction of 60% with grade I diastolic dysfunction, normal segmental wall motion and no significant valve disease.      48-hour Holter monitor done 2022 showed 2 episodes of SVT lasting 4 beats each as well as occasional PVCs as couplets, bigeminy, trigeminy, PVCs occurring 1.75% the time.  Echo done 2022 showed ejection fraction 46-50% with septal hypokinesis, grade 1 diastolic dysfunction, sigmoid septum, no significant valvular abnormalities.     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  MRI cervical spine done 2024 shows multilevel cervical spondylosis with central canal and neural foraminal narrowing at multiple levels, mild to moderate degenerative joint changes.    Labs on 2025 show normal CMP, hemoglobin A1c 5.9%, normal TSH and free T4, total cholesterol 157, HDL 45, LDL 82, VLDL 30, triglycerides 175.    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  She has no chest pain or pressure.  Her energy level is good.  She does not feel her heart racing or skipping.  She has had no dizziness, syncope or falls.  She does struggle with venous incompetence but she is very active.  She and her  Bill are planned to go to [facility] this weekend to ride their [employer].       Past Medical History:   Diagnosis Date    Cardiomyopathy     Chronic cystitis     Cough     Depression     GERD (gastroesophageal reflux disease)     Hyperlipidemia     Hypertension     Scarlet fever     Spinal headache     Strain of thoracic region     Urethral prolapse     URI (upper respiratory infection)     Urinary retention          Past Surgical History:   Procedure Laterality Date    APPENDECTOMY      BLADDER SURGERY      CARDIAC CATHETERIZATION      Normal. Performed at [facility].     SECTION      CHOLECYSTECTOMY      CHOLECYSTECTOMY WITH INTRAOPERATIVE CHOLANGIOGRAM N/A 2022    Procedure: CHOLECYSTECTOMY LAPAROSCOPIC INTRAOPERATIVE CHOLANGIOGRAM;  Surgeon: Rl Ferro MD;  Location: [facility];  Service: General;  Laterality: N/A;    COLONOSCOPY      HYSTERECTOMY      VEIN LIGATION AND STRIPPING         Current Outpatient Medications on File Prior to Visit   Medication Sig Dispense Refill    carvedilol (COREG) 3.125 MG tablet TAKE 1 TABLET BY MOUTH TWICE A  tablet 3    conjugated estrogens (PREMARIN) 0.625 MG/GM vaginal cream Insert 0.625 g into the vagina Every Night.       esomeprazole (nexIUM) 40  "MG capsule Take 1 capsule by mouth Every Morning Before Breakfast. 90 capsule 1    Multiple Vitamins-Minerals (CENTRUM ADULT 50+ MULTIGUMMIES PO) Take  by mouth.      rosuvastatin (CRESTOR) 20 MG tablet TAKE 1 TABLET BY MOUTH EVERY OTHER DAY 45 tablet 3    meloxicam (MOBIC) 7.5 MG tablet Take 1 tablet by mouth Daily. (Patient not taking: Reported on 5/16/2025) 30 tablet 0     No current facility-administered medications on file prior to visit.       Social History     Socioeconomic History    Marital status:    Tobacco Use    Smoking status: Never     Passive exposure: Never    Smokeless tobacco: Never    Tobacco comments:     drink caffiene daily   Vaping Use    Vaping status: Never Used   Substance and Sexual Activity    Alcohol use: No    Drug use: Never    Sexual activity: Not Currently     Partners: Male             Procedures    ECG 12 Lead    Date/Time: 5/16/2025 2:12 PM  Performed by: Anamika Lane MD    Authorized by: Anamika Lane MD  Comparison: compared with previous ECG   Similar to previous ECG  Rhythm: sinus rhythm    Clinical impression: normal ECG              Objective:      Vitals:    05/16/25 1351   BP: 122/80   BP Location: Left arm   Patient Position: Sitting   Cuff Size: Adult   SpO2: 97%   Weight: 66.7 kg (147 lb)   Height: 162.6 cm (64\")     Body mass index is 25.23 kg/m².    Vitals reviewed.   Constitutional:       General: Not in acute distress.     Appearance: Not diaphoretic.   Neck:      Vascular: No carotid bruit or JVD.   Pulmonary:      Effort: Pulmonary effort is normal.      Breath sounds: Normal breath sounds.   Cardiovascular:      Normal rate. Regular rhythm.      Murmurs: There is no murmur.      No gallop.  No rub.   Pulses:     Intact distal pulses.      Carotid: 2+ bilaterally.     Radial: 2+ bilaterally.     Dorsalis pedis: 2+ bilaterally.     Posterior tibial: 2+ bilaterally.  Edema:     Peripheral edema absent.   Neurological:      Cranial Nerves: " No cranial nerve deficit.         Lab Review:       Assessment:      Diagnosis Plan   1. Essential hypertension  CT Cardiac Calcium Score Without Dye    Vascular Screening (Bundle) CAR      2. Mixed hyperlipidemia  CT Cardiac Calcium Score Without Dye    Vascular Screening (Bundle) CAR          Hyperlipidemia, on Crestor every other night  Hypertension.  Controlled on carvedilol.  Normal renal arteriogram in 2002.   Nonischemic cardiomyopathy, mild-normal cardiac catheterization 2005.  Venous varicosities, s/p stripping on left 6/2019.  Still struggles with this.  Recommended compression and elevation.  Venous insufficiency, chronic     Plan:       See back in 1 year, take Crestor every other night.  She will need a repeat echocardiogram next year.

## 2025-05-29 NOTE — TELEPHONE ENCOUNTER
"Patient called and has been out of BP medication for 3 days. Previously filled by Shanna Fernandez     [facility]   NOV 05/29/2026 EE                Assessment:        Diagnosis Plan   1. Essential hypertension  CT Cardiac Calcium Score Without Dye     Vascular Screening (Bundle) CAR       2. Mixed hyperlipidemia  CT Cardiac Calcium Score Without Dye     Vascular Screening (Bundle) CAR             Hyperlipidemia, on Crestor every other night  Hypertension.  Controlled on carvedilol.  Normal renal arteriogram in 2002.   Nonischemic cardiomyopathy, mild-normal cardiac catheterization 2005.  Venous varicosities, s/p stripping on left 6/2019.  Still struggles with this.  Recommended compression and elevation.  Venous insufficiency, chronic     Plan:       See back in 1 year, take Crestor every other night.  She will need a repeat echocardiogram next year.        Instructions      Return in about 1 year (around 5/16/2026) for Dr. Anamika Lane.  Additional Documentation    Vitals: /80 (BP Location: Left arm, Patient Position: Sitting, Cuff Size: Adult)     Ht 162.6 cm (64\")     Wt 66.7 kg (147 lb)     SpO2 97%     BMI 25.23 kg/m²     BSA 1.72 m²   Encounter Info: Billing Info,     History,     Allergies,     Detailed Report,     Prep for Surgery Order Report,     PAF,     ...(6 more)     Communications    View All Conversations on this Encounter  Encounter Information    Encounter Information   Provider Department Encounter #   5/16/2025 2:20 PM Anamika Lane MD [facility] [id number]          "

## 2025-05-30 RX ORDER — CARVEDILOL 3.12 MG/1
3.12 TABLET ORAL 2 TIMES DAILY
Qty: 180 TABLET | Refills: 3 | Status: SHIPPED | OUTPATIENT
Start: 2025-05-30

## 2025-06-11 ENCOUNTER — OFFICE VISIT (OUTPATIENT)
Dept: INTERNAL MEDICINE | Facility: CLINIC | Age: 80
End: 2025-06-11
Payer: MEDICARE

## 2025-06-11 VITALS
TEMPERATURE: 97.3 F | HEART RATE: 63 BPM | OXYGEN SATURATION: 98 % | SYSTOLIC BLOOD PRESSURE: 142 MMHG | RESPIRATION RATE: 16 BRPM | DIASTOLIC BLOOD PRESSURE: 94 MMHG | BODY MASS INDEX: 25.74 KG/M2 | WEIGHT: 150.8 LBS | HEIGHT: 64 IN

## 2025-06-11 DIAGNOSIS — R73.03 PREDIABETES: ICD-10-CM

## 2025-06-11 DIAGNOSIS — R23.2 HOT FLASHES: ICD-10-CM

## 2025-06-11 DIAGNOSIS — E78.2 MIXED HYPERLIPIDEMIA: ICD-10-CM

## 2025-06-11 DIAGNOSIS — K21.9 GASTRO-ESOPHAGEAL REFLUX DISEASE WITHOUT ESOPHAGITIS: ICD-10-CM

## 2025-06-11 DIAGNOSIS — Z00.00 MEDICARE ANNUAL WELLNESS VISIT, SUBSEQUENT: Primary | ICD-10-CM

## 2025-06-11 NOTE — PROGRESS NOTES
Subjective   The ABCs of the Annual Wellness Visit  Medicare Wellness Visit      Codie Munson is a 79 y.o. patient who presents for a Medicare Wellness Visit.    The following portions of the patient's history were reviewed and   updated as appropriate: allergies, current medications, past family history, past medical history, past social history, past surgical history, and problem list.    Compared to one year ago, the patient's physical   health is the same.  Compared to one year ago, the patient's mental   health is the same.    Recent Hospitalizations:  This patient has had a [facility] admission record on file within the last 365 days.  Current Medical Providers:  Patient Care Team:  Eunice Barger MD as PCP - General (Internal Medicine)    Outpatient Medications Prior to Visit   Medication Sig Dispense Refill    carvedilol (COREG) 3.125 MG tablet Take 1 tablet by mouth 2 (Two) Times a Day. 180 tablet 3    conjugated estrogens (PREMARIN) 0.625 MG/GM vaginal cream Insert 0.625 g into the vagina Every Night. Monday Wednesday friday      esomeprazole (nexIUM) 40 MG capsule Take 1 capsule by mouth Every Morning Before Breakfast. 90 capsule 1    Multiple Vitamins-Minerals (CENTRUM ADULT 50+ MULTIGUMMIES PO) Take  by mouth.      rosuvastatin (CRESTOR) 20 MG tablet TAKE 1 TABLET BY MOUTH EVERY OTHER DAY 45 tablet 3    VITAMIN D PO Take  by mouth.       No facility-administered medications prior to visit.     No opioid medication identified on active medication list. I have reviewed chart for other potential  high risk medication/s and harmful drug interactions in the elderly.      Aspirin is not on active medication list.  Aspirin use is not indicated based on review of current medical condition/s. Risk of harm outweighs potential benefits.  .    Patient Active Problem List   Diagnosis    Bladder infection, chronic    Blues    Gastro-esophageal reflux disease without esophagitis    HLD (hyperlipidemia)  "   Prolapse of urethra    Incomplete bladder emptying    Gastroesophageal reflux disease with hiatal hernia    Erosive gastritis    Diverticulosis of large intestine without hemorrhage    Chronic UTI    Foot sprain    Medicare annual wellness visit, subsequent    Screening mammogram, encounter for    Lower abdominal pain    Hematuria    Left knee pain    Chondromalacia of patellofemoral joint, left    Acute left ankle pain    Varicose veins of both lower extremities    Pain and swelling of left lower leg    Postmenopausal    Hiatal hernia    Diverticulitis of colon    Acquired hypothyroidism    PVC (premature ventricular contraction)    Cardiomyopathy    Essential hypertension    Acute on chronic combined systolic and diastolic CHF (congestive heart failure)    Hypothyroidism due to acquired atrophy of thyroid    Acute cystitis without hematuria    Periumbilical abdominal pain    Problem with vaginal pessary    Prediabetes     Advance Care Planning Advance Directive is not on file.  ACP discussion was held with the patient during this visit. Patient does not have an advance directive, information provided.            Objective   Vitals:    06/11/25 0953   BP: 142/94   BP Location: Left arm   Patient Position: Sitting   Cuff Size: Adult   Pulse: 63   Resp: 16   Temp: 97.3 °F (36.3 °C)   TempSrc: Infrared   SpO2: 98%   Weight: 68.4 kg (150 lb 12.8 oz)   Height: 162.6 cm (64\")   PainSc: 10-Worst pain ever   PainLoc: Ankle  Comment: right       Estimated body mass index is 25.88 kg/m² as calculated from the following:    Height as of this encounter: 162.6 cm (64\").    Weight as of this encounter: 68.4 kg (150 lb 12.8 oz).    BMI is >= 25 and <30. (Overweight) The following options were offered after discussion;: BMI is not accurate for all patients. Ms. Munson is healthy and doing well.        Does the patient have evidence of cognitive impairment? No  Lab Results   Component Value Date    TRIG 175 (H) 05/14/2025    HDL " 45 2025    LDL 82 2025    VLDL 30 2025    HGBA1C 5.90 (H) 2025                                                                                               Health  Risk Assessment    Smoking Status:  Social History     Tobacco Use   Smoking Status Never    Passive exposure: Never   Smokeless Tobacco Never   Tobacco Comments    drink caffiene daily     Alcohol Consumption:  Social History     Substance and Sexual Activity   Alcohol Use No       Fall Risk Screen  STEADI Fall Risk Assessment was completed, and patient is at LOW risk for falls.Assessment completed on:2025    Depression Screening   Little interest or pleasure in doing things? Not at all   Feeling down, depressed, or hopeless? Not at all   PHQ-2 Total Score 0      Health Habits and Functional and Cognitive Screenin/11/2025     9:57 AM   Functional & Cognitive Status   Do you have difficulty preparing food and eating? No   Do you have difficulty bathing yourself, getting dressed or grooming yourself? No   Do you have difficulty using the toilet? No   Do you have difficulty moving around from place to place? No   Do you have trouble with steps or getting out of a bed or a chair? 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  No   Are you deaf or do you have serious difficulty hearing?  No   Do you need help to go to places out of walking distance? No   Do you need help shopping? No   Do you need help preparing meals?  No   Do you need help with housework?  No   Do you need help with laundry? No   Do you need help taking your medications? No   Do you need help managing money? No   Do you ever drive or ride in a car without wearing a seat belt? No   Have you felt unusual stress, anger or loneliness in the last month?  No   Who do you live with? Spouse   If you need help, do you have trouble finding someone available to you? No   Have you been bothered in the last four weeks by sexual problems? No   Do you have difficulty concentrating, remembering or making decisions? No           Age-appropriate Screening Schedule:  Refer to the list below for future screening recommendations based on patient's age, sex and/or medical conditions. Orders for these recommended tests are listed in the plan section. The patient has been provided with a written plan.    Health Maintenance List  Health Maintenance   Topic Date Due    COVID-19 Vaccine (5 - 2024-25 season) 03/06/2026 (Originally 9/1/2024)    RSV Vaccine - Adults (1 - 1-dose 75+ series) 06/11/2026 (Originally 9/4/2018)    INFLUENZA VACCINE  07/01/2025    LIPID PANEL  05/14/2026    ANNUAL WELLNESS VISIT  06/11/2026    DXA SCAN  06/21/2026    Pneumococcal Vaccine 50+  Completed    MAMMOGRAM  Discontinued    TDAP/TD VACCINES  Discontinued    ZOSTER VACCINE  Discontinued    COLORECTAL CANCER SCREENING  Discontinued                                                                                                                                                CMS Preventative Services Quick Reference  Risk Factors Identified During Encounter  None Identified    The above risks/problems have been discussed with the patient.  Pertinent information has been shared with the patient in the After Visit Summary.  An After Visit Summary and PPPS were made available to the patient.    Follow Up:   Next Medicare Wellness visit to be scheduled in 1 year.     Assessment & Plan  Gastro-esophageal reflux disease without esophagitis         Medicare annual wellness visit, subsequent         Prediabetes         Mixed hyperlipidemia            Hot flashes              Follow Up:   Return in about 3 months (around 9/11/2025) for f/u hot flashes, supplements.

## 2025-06-11 NOTE — PATIENT INSTRUCTIONS
Start some Magnesium glycinate 400 mg nightly for sleep and leg cramps.      You can try Black Cohosh supplements to help with menopause symptoms.

## 2025-06-26 ENCOUNTER — TELEPHONE (OUTPATIENT)
Dept: INTERNAL MEDICINE | Facility: CLINIC | Age: 80
End: 2025-06-26
Payer: MEDICARE

## 2025-06-26 DIAGNOSIS — K21.9 GASTRO-ESOPHAGEAL REFLUX DISEASE WITHOUT ESOPHAGITIS: ICD-10-CM

## 2025-06-26 RX ORDER — ESOMEPRAZOLE MAGNESIUM 40 MG/1
40 CAPSULE, DELAYED RELEASE ORAL
Qty: 90 CAPSULE | Refills: 1 | Status: SHIPPED | OUTPATIENT
Start: 2025-06-26

## 2025-06-26 NOTE — TELEPHONE ENCOUNTER
She would like Dr. Barger to have her Nexium filled.  The RX had the generic on it she does not want the generic.  I told her I would send a message.

## 2025-07-01 ENCOUNTER — TRANSCRIBE ORDERS (OUTPATIENT)
Dept: ADMINISTRATIVE | Facility: HOSPITAL | Age: 80
End: 2025-07-01
Payer: MEDICARE

## 2025-07-01 DIAGNOSIS — Z12.31 VISIT FOR SCREENING MAMMOGRAM: Primary | ICD-10-CM

## 2025-07-12 ENCOUNTER — HOSPITAL ENCOUNTER (OUTPATIENT)
Facility: HOSPITAL | Age: 80
Discharge: HOME | End: 2025-07-12
Attending: EMERGENCY MEDICINE | Admitting: EMERGENCY MEDICINE
Payer: MEDICARE

## 2025-07-12 VITALS
RESPIRATION RATE: 18 BRPM | HEIGHT: 65 IN | OXYGEN SATURATION: 99 % | HEART RATE: 68 BPM | TEMPERATURE: 96.9 F | BODY MASS INDEX: 23.32 KG/M2 | SYSTOLIC BLOOD PRESSURE: 156 MMHG | DIASTOLIC BLOOD PRESSURE: 73 MMHG | WEIGHT: 140 LBS

## 2025-07-12 DIAGNOSIS — N30.90 CYSTITIS: Primary | ICD-10-CM

## 2025-07-12 LAB
BACTERIA UR QL AUTO: ABNORMAL /HPF
BILIRUB UR QL STRIP: NEGATIVE
CLARITY UR: ABNORMAL
COLOR UR: YELLOW
GLUCOSE UR STRIP-MCNC: NEGATIVE MG/DL
HGB UR QL STRIP.AUTO: ABNORMAL
HOLD SPECIMEN: NORMAL
HYALINE CASTS UR QL AUTO: ABNORMAL /LPF
KETONES UR QL STRIP: NEGATIVE
LEUKOCYTE ESTERASE UR QL STRIP.AUTO: ABNORMAL
NITRITE UR QL STRIP: NEGATIVE
PH UR STRIP.AUTO: 5.5 [PH] (ref 5–8)
PROT UR QL STRIP: NEGATIVE
RBC # UR STRIP: ABNORMAL /HPF
REF LAB TEST METHOD: ABNORMAL
SP GR UR STRIP: <=1.005 (ref 1–1.03)
SQUAMOUS #/AREA URNS HPF: ABNORMAL /HPF
UROBILINOGEN UR QL STRIP: ABNORMAL
WBC # UR STRIP: ABNORMAL /HPF

## 2025-07-12 PROCEDURE — 87086 URINE CULTURE/COLONY COUNT: CPT | Performed by: EMERGENCY MEDICINE

## 2025-07-12 PROCEDURE — G0463 HOSPITAL OUTPT CLINIC VISIT: HCPCS

## 2025-07-12 PROCEDURE — 87077 CULTURE AEROBIC IDENTIFY: CPT | Performed by: EMERGENCY MEDICINE

## 2025-07-12 PROCEDURE — 81001 URINALYSIS AUTO W/SCOPE: CPT | Performed by: EMERGENCY MEDICINE

## 2025-07-12 PROCEDURE — 87186 SC STD MICRODIL/AGAR DIL: CPT | Performed by: EMERGENCY MEDICINE

## 2025-07-12 RX ORDER — CEPHALEXIN 500 MG/1
500 CAPSULE ORAL ONCE
Status: COMPLETED | OUTPATIENT
Start: 2025-07-12 | End: 2025-07-12

## 2025-07-12 RX ORDER — CEPHALEXIN 500 MG/1
500 CAPSULE ORAL 4 TIMES DAILY
Qty: 28 CAPSULE | Refills: 0 | Status: SHIPPED | OUTPATIENT
Start: 2025-07-12 | End: 2025-07-19

## 2025-07-12 RX ADMIN — CEPHALEXIN 500 MG: 500 CAPSULE ORAL at 20:14

## 2025-07-13 NOTE — FSED PROVIDER NOTE
Subjective   History of Present Illness  Patient is an 79-year-old female who presents for urinary symptoms.  Patient reports she has noticed a generalized low back ache over the past couple of weeks.  She send has some mild mid lower abdominal pressure over the past couple of days, and today started with burning with urination.  Reports she otherwise feels fine.  Perhaps has some mild nausea.  No fever.        Review of Systems   Constitutional:  Negative for appetite change, chills, diaphoresis, fatigue and fever.   Gastrointestinal:  Positive for abdominal pain and nausea. Negative for vomiting.   Genitourinary:  Positive for dysuria, frequency and urgency. Negative for flank pain.   Musculoskeletal:  Positive for back pain.       Past Medical History:   Diagnosis Date    Cardiomyopathy     Chronic cystitis     Cough     Depression     GERD (gastroesophageal reflux disease)     Hyperlipidemia     Hypertension     Scarlet fever     Spinal headache     Strain of thoracic region     Urethral prolapse     URI (upper respiratory infection)     Urinary retention        Allergies   Allergen Reactions    Augmentin [Amoxicillin-Pot Clavulanate] Rash    Biaxin [Clarithromycin] Rash       Past Surgical History:   Procedure Laterality Date    APPENDECTOMY      BLADDER SURGERY      CARDIAC CATHETERIZATION      Normal. Performed at [facility].     SECTION      CHOLECYSTECTOMY      CHOLECYSTECTOMY WITH INTRAOPERATIVE CHOLANGIOGRAM N/A 2022    Procedure: CHOLECYSTECTOMY LAPAROSCOPIC INTRAOPERATIVE CHOLANGIOGRAM;  Surgeon: Rl Ferro MD;  Location: [facility];  Service: General;  Laterality: N/A;    COLONOSCOPY      HYSTERECTOMY      VEIN LIGATION AND STRIPPING         Family History   Problem Relation Age of Onset    Heart failure Mother     Kidney failure Mother     Aneurysm Father     Heart disease Father         CABG    Stroke Father     Crohn's disease Sister     Prostate cancer Brother      Cancer Brother         bladder    Breast cancer Neg Hx        Social History     Socioeconomic History    Marital status:    Tobacco Use    Smoking status: Never     Passive exposure: Never    Smokeless tobacco: Never    Tobacco comments:     drink caffiene daily   Vaping Use    Vaping status: Never Used   Substance and Sexual Activity    Alcohol use: No    Drug use: Never    Sexual activity: Not Currently     Partners: Male           Objective   Physical Exam  Constitutional:       General: She is not in acute distress.     Appearance: She is normal weight. She is not ill-appearing, toxic-appearing or diaphoretic.   Cardiovascular:      Rate and Rhythm: Normal rate.   Pulmonary:      Effort: Pulmonary effort is normal.   Abdominal:      Tenderness: There is no right CVA tenderness or left CVA tenderness.      Comments: Some mild suprapubic tenderness to palpation.  Abdomen overall soft, nonsurgical.  No guarding or rebound.   Musculoskeletal:         General: No swelling or tenderness. Normal range of motion.      Comments: Patient indicates very low lumbar ache.  Nontender to palpation.   Skin:     General: Skin is warm and dry.   Neurological:      Mental Status: She is alert.   Psychiatric:         Mood and Affect: Mood normal.         Behavior: Behavior normal.         Procedures           ED Course                                           Medical Decision Making  Patient is a 79-year-old female who presents for urinary symptoms.  She appears well, no acute stress, nontoxic.  Vital signs WNL.  She has some mild suprapubic tenderness to palpation.  Exam is otherwise unremarkable.  Urinalysis has some trace leukocytes and blood.  Overall, consistent with UTI.  Will treat with Keflex 4 times daily given the duration of her symptoms.  Strict return precautions given.  Patient also reports she will follow-up with her primary care this week.  Discussed when to return to the emergency department.  Answered  all questions.  Patient verbalized understanding and was agreeable to plan and discharge.    My differential diagnosis includes but is not one-two: UTI, cystitis, pyelonephritis, STD, LUIS, ARF, sepsis, kidney stone    Problems Addressed:  Cystitis: complicated acute illness or injury    Risk  Prescription drug management.        Final diagnoses:   Cystitis       ED Disposition  ED Disposition       ED Disposition   Discharge    Condition   Stable    Comment   --               Eunice Barger MD  [address] [facility] [address] [facility] [address]  [facility] [address] [phone]               Medication List        New Prescriptions      cephalexin 500 MG capsule  Commonly known as: KEFLEX  Take 1 capsule by mouth 4 (Four) Times a Day for 7 days.               Where to Get Your Medications        These medications were sent to [facility] [id number] - [place] - [address] [facility] [address] - [phone]  - [phone] FX  [address] [facility] [address], [facility] [id number]      Phone: [phone]   cephalexin 500 MG capsule

## 2025-07-13 NOTE — DISCHARGE INSTRUCTIONS
Take the prescribed antibiotic medicine you are given as directed until it is gone. Take it even if you feel better. It treats the infection and stops it from returning. Not taking all the medicine can make future infections hard to treat.  Return to emergency department for worsening symptoms or other medical emergencies.  Recommended follow-up with PCP.  Refer to the attached instructions for further information.

## 2025-07-14 LAB — BACTERIA SPEC AEROBE CULT: ABNORMAL

## 2025-08-11 ENCOUNTER — HOSPITAL ENCOUNTER (OUTPATIENT)
Dept: MAMMOGRAPHY | Facility: HOSPITAL | Age: 80
Discharge: HOME | End: 2025-08-11
Admitting: INTERNAL MEDICINE
Payer: MEDICARE

## 2025-08-11 DIAGNOSIS — Z12.31 VISIT FOR SCREENING MAMMOGRAM: ICD-10-CM

## 2025-08-11 PROCEDURE — 77067 SCR MAMMO BI INCL CAD: CPT

## 2025-08-11 PROCEDURE — 77063 BREAST TOMOSYNTHESIS BI: CPT

## 2025-08-21 ENCOUNTER — HOSPITAL ENCOUNTER (OUTPATIENT)
Dept: CT IMAGING | Facility: HOSPITAL | Age: 80
Discharge: HOME | End: 2025-08-21

## 2025-08-21 ENCOUNTER — HOSPITAL ENCOUNTER (OUTPATIENT)
Dept: CARDIOLOGY | Facility: HOSPITAL | Age: 80
Discharge: HOME | End: 2025-08-21

## 2025-08-21 ENCOUNTER — RESULTS FOLLOW-UP (OUTPATIENT)
Dept: CARDIOLOGY | Facility: CLINIC | Age: 80
End: 2025-08-21
Payer: MEDICARE

## 2025-08-21 DIAGNOSIS — E78.2 MIXED HYPERLIPIDEMIA: ICD-10-CM

## 2025-08-21 DIAGNOSIS — I10 ESSENTIAL HYPERTENSION: ICD-10-CM

## 2025-08-21 LAB
BH CV VAS SCREENING CAROTID CCA LEFT: 86 CM/SEC
BH CV VAS SCREENING CAROTID CCA RIGHT: 68 CM/SEC
BH CV VAS SCREENING CAROTID ICA LEFT: 63 CM/SEC
BH CV VAS SCREENING CAROTID ICA RIGHT: 74 CM/SEC
BH CV XLRA MEAS - MID AO DIAM: 1.7 CM
BH CV XLRA MEAS - PAD LEFT ABI PT: 1.02
BH CV XLRA MEAS - PAD LEFT ARM: 165 MMHG
BH CV XLRA MEAS - PAD LEFT LEG PT: 176 MMHG
BH CV XLRA MEAS - PAD RIGHT ABI PT: 1.09
BH CV XLRA MEAS - PAD RIGHT ARM: 172 MMHG
BH CV XLRA MEAS - PAD RIGHT LEG PT: 187 MMHG
BH CV XLRA MEAS LEFT DIST CCA PSV: -85.7 CM/SEC
BH CV XLRA MEAS LEFT ICA/CCA RATIO: 0.7
BH CV XLRA MEAS LEFT PROX ICA PSV: -62.7 CM/SEC
BH CV XLRA MEAS RIGHT DIST CCA PSV: -67.7 CM/SEC
BH CV XLRA MEAS RIGHT ICA/CCA RATIO: 1.1
BH CV XLRA MEAS RIGHT PROX ICA PSV: -73.9 CM/SEC

## 2025-08-21 PROCEDURE — 93799 UNLISTED CV SVC/PROCEDURE: CPT

## 2025-08-21 PROCEDURE — VASCULARSCN2 VASCULAR SCREENING (BUNDLE) CAR: Performed by: INTERNAL MEDICINE

## 2025-08-21 PROCEDURE — 75571 CT HRT W/O DYE W/CA TEST: CPT

## 2025-08-21 RX ORDER — ASPIRIN 81 MG/1
81 TABLET ORAL DAILY
COMMUNITY

## 2025-08-27 ENCOUNTER — OFFICE VISIT (OUTPATIENT)
Dept: GASTROENTEROLOGY | Facility: CLINIC | Age: 80
End: 2025-08-27
Payer: MEDICARE

## 2025-08-27 VITALS
DIASTOLIC BLOOD PRESSURE: 80 MMHG | BODY MASS INDEX: 25.36 KG/M2 | HEIGHT: 65 IN | WEIGHT: 152.2 LBS | SYSTOLIC BLOOD PRESSURE: 128 MMHG

## 2025-08-27 DIAGNOSIS — R19.04 LLQ ABDOMINAL MASS: ICD-10-CM

## 2025-08-27 DIAGNOSIS — Z12.11 SCREENING FOR COLON CANCER: Primary | ICD-10-CM

## 2025-08-27 DIAGNOSIS — R11.0 NAUSEA: ICD-10-CM

## 2025-08-27 DIAGNOSIS — K21.9 GASTRO-ESOPHAGEAL REFLUX DISEASE WITHOUT ESOPHAGITIS: ICD-10-CM

## 2025-08-27 RX ORDER — ESOMEPRAZOLE MAGNESIUM 40 MG/1
40 CAPSULE, DELAYED RELEASE ORAL
Qty: 180 CAPSULE | Refills: 3 | Status: SHIPPED | OUTPATIENT
Start: 2025-08-27

## 2025-08-27 RX ORDER — CARVEDILOL 3.12 MG/1
3.12 TABLET ORAL 2 TIMES DAILY WITH MEALS
COMMUNITY

## 2025-08-27 RX ORDER — ONDANSETRON 4 MG/1
4 TABLET, ORALLY DISINTEGRATING ORAL EVERY 8 HOURS PRN
Qty: 30 TABLET | Refills: 1 | Status: SHIPPED | OUTPATIENT
Start: 2025-08-27

## (undated) DEVICE — VISUALIZATION SYSTEM: Brand: CLEARIFY

## (undated) DEVICE — DEV SUT GRSPR CLOSUR 15CM 14G

## (undated) DEVICE — SUT MNCRYL PLS ANTIB UD 4/0 PS2 18IN

## (undated) DEVICE — EXTENSION SET, MALE LUER LOCK ADAPTER WITH RETRACTABLE COLLAR

## (undated) DEVICE — DRAPE,REIN 53X77,STERILE: Brand: MEDLINE

## (undated) DEVICE — ENDOPATH XCEL UNIVERSAL TROCAR STABLILITY SLEEVES: Brand: ENDOPATH XCEL

## (undated) DEVICE — GLV SURG PREMIERPRO ORTHO LTX PF SZ7.5 BRN

## (undated) DEVICE — SUT VIC 0/0 UR6 27IN DYED J603H

## (undated) DEVICE — STPCK 3WY D201 DISCOFIX

## (undated) DEVICE — ENDOPATH PNEUMONEEDLE INSUFFLATION NEEDLES WITH LUER LOCK CONNECTORS 120MM: Brand: ENDOPATH

## (undated) DEVICE — LAPAROSCOPIC SMOKE FILTRATION SYSTEM: Brand: PALL LAPAROSHIELD® PLUS LAPAROSCOPIC SMOKE FILTRATION SYSTEM

## (undated) DEVICE — LOU LAP CHOLE: Brand: MEDLINE INDUSTRIES, INC.

## (undated) DEVICE — APPL CHLORAPREP HI/LITE 26ML ORNG

## (undated) DEVICE — DISPOSABLE MONOPOLAR ENDOSCOPIC CORD 10 FT. (3M): Brand: KIRWAN

## (undated) DEVICE — ENDOCUT SCISSOR TIP, DISPOSABLE: Brand: RENEW

## (undated) DEVICE — CONTAINER,SPECIMEN,OR STERILE,4OZ: Brand: MEDLINE

## (undated) DEVICE — ENDOPATH XCEL BLADELESS TROCARS WITH STABILITY SLEEVES: Brand: ENDOPATH XCEL

## (undated) DEVICE — CATH CHOLANG 4.5F18IN BRGNDY

## (undated) DEVICE — CATH IV INSYTE AUTOGARD 14G 1 1/2IN ORNG

## (undated) DEVICE — SOL NACL 0.9PCT 1000ML

## (undated) DEVICE — ADHS SKIN SURG TISS VISC PREMIERPRO EXOFIN HI/VISC FAST/DRY

## (undated) DEVICE — ENDOPOUCH RETRIEVER SPECIMEN RETRIEVAL BAGS: Brand: ENDOPOUCH RETRIEVER

## (undated) DEVICE — PATIENT RETURN ELECTRODE, SINGLE-USE, CONTACT QUALITY MONITORING, ADULT, WITH 9FT CORD, FOR PATIENTS WEIGING OVER 33LBS. (15KG): Brand: MEGADYNE

## (undated) DEVICE — DRP C/ARM 41X74IN